# Patient Record
Sex: MALE | Race: AMERICAN INDIAN OR ALASKA NATIVE | NOT HISPANIC OR LATINO | Employment: OTHER | URBAN - METROPOLITAN AREA
[De-identification: names, ages, dates, MRNs, and addresses within clinical notes are randomized per-mention and may not be internally consistent; named-entity substitution may affect disease eponyms.]

---

## 2021-10-20 ENCOUNTER — HOSPITAL ENCOUNTER (EMERGENCY)
Facility: CLINIC | Age: 76
Discharge: HOME | End: 2021-10-20
Attending: FAMILY MEDICINE | Admitting: FAMILY MEDICINE
Payer: MEDICARE

## 2021-10-20 VITALS
HEART RATE: 75 BPM | OXYGEN SATURATION: 97 % | TEMPERATURE: 97.8 F | WEIGHT: 225 LBS | DIASTOLIC BLOOD PRESSURE: 70 MMHG | BODY MASS INDEX: 30.48 KG/M2 | SYSTOLIC BLOOD PRESSURE: 102 MMHG | HEIGHT: 72 IN | RESPIRATION RATE: 16 BRPM

## 2021-10-20 DIAGNOSIS — K57.32 DIVERTICULITIS OF COLON: ICD-10-CM

## 2021-10-20 PROCEDURE — 99283 EMERGENCY DEPT VISIT LOW MDM: CPT

## 2021-10-20 ASSESSMENT — MIFFLIN-ST. JEOR: SCORE: 1788.59

## 2021-10-20 NOTE — ED PROVIDER NOTES
EMERGENCY DEPARTMENT ENCOUNTER      NAME: Carlos Ch  AGE: 76 year old male  YOB: 1945  MRN: [id number]  EVALUATION DATE & TIME: 10/20/2021  1:59 PM    PCP: No primary care provider on file.    ED PROVIDER: Jace Mcginnis M.D.    Chief Complaint   Patient presents with     Abdominal Pain       FINAL IMPRESSION:  1. Diverticulitis of colon        ED COURSE & MEDICAL DECISION MAKING:    Pertinent Labs & Imaging studies personally reviewed and interpreted by me. (See chart for details)  2:08 PM  Patient seen and examined, prior records reviewed.  Differential diagnosis includes but not limited to gastritis, cholecystitis, pancreatitis, colitis, enteritis, diverticulitis, urinary tract infection, myocardial infarction, pneumonia appendicitis, AAA, cholelithiasis, ischemic bowel.  Patient presents with lower abdominal pain, had a CT scan done yesterday which showed diverticulitis without perforation.  No significant increase in his pain, no fevers.  Vitally stable and labs yesterday showed no leukocytosis.  Patient will be started on Augmentin and discharged with outpatient follow-up.           At the conclusion of the encounter I discussed the results of all of the tests and the disposition. The questions were answered. The patient or family acknowledged understanding and was agreeable with the care plan.     PROCEDURES:   Procedures    MEDICATIONS GIVEN IN THE EMERGENCY:  Medications - No data to display    NEW PRESCRIPTIONS STARTED AT TODAY'S ER VISIT  New Prescriptions    AMOXICILLIN-CLAVULANATE (AUGMENTIN) 875-125 MG TABLET    Take 1 tablet by mouth 2 times daily for 7 days       =================================================================    HPI    Patient information was obtained from: Patient      Carlos Ch is a 76 year old male with a pertinent history of diverticulitis who presents to this ED for evaluation of abdominal pain.  Patient has 2 or 3 days of low abdominal pain.  No  diarrhea or blood in the stools.  No fever, chills, nausea, or vomiting.  He had CTA done yesterday for surveillance of portal venous aneurysm which demonstrated incidental diverticulitis.  He had labs done yesterday which showed normal white blood cell count.  He has not had significant change in his pain since yesterday, tried to contact his primary care doctor for an appointment to get antibiotics prescribed but was unable to get an appointment.      REVIEW OF SYSTEMS   Review of Systems   All other systems reviewed and negative    PAST MEDICAL HISTORY:  No past medical history on file.    PAST SURGICAL HISTORY:  No past surgical history on file.    CURRENT MEDICATIONS:    No current facility-administered medications for this encounter.     Current Outpatient Medications   Medication     amoxicillin-clavulanate (AUGMENTIN) 875-125 MG tablet       ALLERGIES:  Allergies   Allergen Reactions     Clopidogrel Rash     Celecoxib Rash     Sulfa Drugs Rash       FAMILY HISTORY:  No family history on file.    SOCIAL HISTORY:   Social History     Socioeconomic History     Marital status: Not on file     Spouse name: Not on file     Number of children: Not on file     Years of education: Not on file     Highest education level: Not on file   Occupational History     Not on file   Tobacco Use     Smoking status: Not on file   Substance and Sexual Activity     Alcohol use: Not on file     Drug use: Not on file     Sexual activity: Not on file   Other Topics Concern     Not on file   Social History Narrative     Not on file     Social Determinants of Health     Financial Resource Strain:      Difficulty of Paying Living Expenses:    Food Insecurity:      Worried About Running Out of Food in the Last Year:      Ran Out of Food in the Last Year:    Transportation Needs:      Lack of Transportation (Medical):      Lack of Transportation (Non-Medical):    Physical Activity:      Days of Exercise per Week:      Minutes of Exercise  per Session:    Stress:      Feeling of Stress :    Social Connections:      Frequency of Communication with Friends and Family:      Frequency of Social Gatherings with Friends and Family:      Attends Taoism Services:      Active Member of Clubs or Organizations:      Attends Club or Organization Meetings:      Marital Status:    Intimate Partner Violence:      Fear of Current or Ex-Partner:      Emotionally Abused:      Physically Abused:      Sexually Abused:        VITALS:  /70   Pulse 75   Temp 97.8  F (36.6  C) (Temporal)   Resp 16   Ht 1.829 m (6')   Wt 102.1 kg (225 lb)   SpO2 97%   BMI 30.52 kg/m      PHYSICAL EXAM:  Physical Exam  Vitals and nursing note reviewed.   Constitutional:       Appearance: Normal appearance.   HENT:      Head: Normocephalic and atraumatic.      Right Ear: External ear normal.      Left Ear: External ear normal.      Nose: Nose normal.      Mouth/Throat:      Mouth: Mucous membranes are moist.   Eyes:      Extraocular Movements: Extraocular movements intact.      Conjunctiva/sclera: Conjunctivae normal.      Pupils: Pupils are equal, round, and reactive to light.   Cardiovascular:      Rate and Rhythm: Normal rate and regular rhythm.   Pulmonary:      Effort: Pulmonary effort is normal.      Breath sounds: Normal breath sounds. No wheezing or rales.   Abdominal:      General: Abdomen is flat. There is no distension.      Palpations: Abdomen is soft.      Tenderness: There is abdominal tenderness in the suprapubic area and left lower quadrant. There is no guarding.   Musculoskeletal:         General: Normal range of motion.      Cervical back: Normal range of motion and neck supple.      Right lower leg: No edema.      Left lower leg: No edema.   Lymphadenopathy:      Cervical: No cervical adenopathy.   Skin:     General: Skin is warm and dry.   Neurological:      General: No focal deficit present.      Mental Status: He is alert and oriented to person, place, and  time. Mental status is at baseline.      Comments: No gross focal neurologic deficits   Psychiatric:         Mood and Affect: Mood normal.         Behavior: Behavior normal.         Thought Content: Thought content normal.          LAB:  Reviewed outside labs from yesterday       RADIOLOGY:  Reviewed CTA report from outside facility done yesterday  No orders to display     Jace Mcginnis M.D.  Emergency Medicine  [facility] EMERGENCY ROOM  [address] [id number] [phone]  Dept: [phone]     Jace Mcginnis MD  10/20/21 [id number]

## 2021-10-20 NOTE — ED TRIAGE NOTES
"Arrives to ED with c/o LLQ abd pain. Reports having diverticulitis flare up. Went to [place] yesterday for an appt and incidentally dx with diverticulitis. Lab work completed at that time. PCP is out of town. Unable to make appt. Requesting prescription for Augmentin. \"That usually works for me\". Reports unable to sleep last night d/t pain. Denies bloody stools. Afebrile. Denies N/V.    "

## 2022-03-28 ENCOUNTER — APPOINTMENT (OUTPATIENT)
Dept: CT IMAGING | Facility: CLINIC | Age: 77
End: 2022-03-28
Attending: EMERGENCY MEDICINE
Payer: MEDICARE

## 2022-03-28 ENCOUNTER — HOSPITAL ENCOUNTER (OUTPATIENT)
Facility: CLINIC | Age: 77
Setting detail: OBSERVATION
Discharge: HOME | End: 2022-03-29
Attending: HOSPITALIST | Admitting: STUDENT IN AN ORGANIZED HEALTH CARE EDUCATION/TRAINING PROGRAM
Payer: MEDICARE

## 2022-03-28 ENCOUNTER — APPOINTMENT (OUTPATIENT)
Dept: RADIOLOGY | Facility: CLINIC | Age: 77
End: 2022-03-28
Attending: EMERGENCY MEDICINE
Payer: MEDICARE

## 2022-03-28 ENCOUNTER — HOSPITAL ENCOUNTER (EMERGENCY)
Facility: CLINIC | Age: 77
Discharge: STILL ADMITTED OR ADMINISTRATIVE | End: 2022-03-28
Attending: EMERGENCY MEDICINE | Admitting: EMERGENCY MEDICINE
Payer: MEDICARE

## 2022-03-28 VITALS
RESPIRATION RATE: 31 BRPM | BODY MASS INDEX: 30.52 KG/M2 | DIASTOLIC BLOOD PRESSURE: 98 MMHG | SYSTOLIC BLOOD PRESSURE: 171 MMHG | HEART RATE: 106 BPM | OXYGEN SATURATION: 97 % | TEMPERATURE: 98.6 F | WEIGHT: 225 LBS

## 2022-03-28 DIAGNOSIS — S09.90XA HEAD INJURY, INITIAL ENCOUNTER: ICD-10-CM

## 2022-03-28 DIAGNOSIS — S20.211A CHEST WALL CONTUSION, RIGHT, INITIAL ENCOUNTER: ICD-10-CM

## 2022-03-28 DIAGNOSIS — I48.92 ATRIAL FLUTTER, UNSPECIFIED TYPE (H): ICD-10-CM

## 2022-03-28 DIAGNOSIS — S05.11XA PERIORBITAL CONTUSION, RIGHT, INITIAL ENCOUNTER: ICD-10-CM

## 2022-03-28 DIAGNOSIS — S63.253A CLOSED DISLOCATION OF LEFT MIDDLE FINGER: ICD-10-CM

## 2022-03-28 DIAGNOSIS — Z79.01 WARFARIN ANTICOAGULATION: ICD-10-CM

## 2022-03-28 DIAGNOSIS — R55 SYNCOPE, UNSPECIFIED SYNCOPE TYPE: ICD-10-CM

## 2022-03-28 PROBLEM — N13.8 BPH WITH OBSTRUCTION/LOWER URINARY TRACT SYMPTOMS: Status: ACTIVE | Noted: 2020-06-26

## 2022-03-28 PROBLEM — I49.5 TACHY-BRADY SYNDROME (H): Status: ACTIVE | Noted: 2021-03-18

## 2022-03-28 PROBLEM — D75.1 ERYTHROCYTOSIS: Status: ACTIVE | Noted: 2020-10-24

## 2022-03-28 PROBLEM — N40.1 BPH WITH OBSTRUCTION/LOWER URINARY TRACT SYMPTOMS: Status: ACTIVE | Noted: 2020-06-26

## 2022-03-28 PROBLEM — I48.0 PAF (PAROXYSMAL ATRIAL FIBRILLATION) (H): Status: ACTIVE | Noted: 2021-03-18

## 2022-03-28 PROBLEM — D89.0 POLYCLONAL GAMMOPATHY: Status: ACTIVE | Noted: 2020-10-24

## 2022-03-28 PROBLEM — I25.10 CORONARY ATHEROSCLEROSIS: Status: ACTIVE | Noted: 2021-09-14

## 2022-03-28 PROBLEM — Z95.0 PACEMAKER: Status: ACTIVE | Noted: 2021-03-22

## 2022-03-28 PROBLEM — D69.3 CHRONIC ITP (IDIOPATHIC THROMBOCYTOPENIC PURPURA) (H): Status: ACTIVE | Noted: 2020-10-24

## 2022-03-28 PROBLEM — I86.8: Status: ACTIVE | Noted: 2019-11-07

## 2022-03-28 PROBLEM — I10 BENIGN ESSENTIAL HYPERTENSION: Status: ACTIVE | Noted: 2020-11-03

## 2022-03-28 PROBLEM — R39.15 URINARY URGENCY: Status: ACTIVE | Noted: 2020-06-26

## 2022-03-28 PROBLEM — M19.90 ARTHRITIS: Status: ACTIVE | Noted: 2021-09-14

## 2022-03-28 PROBLEM — K57.30 DIVERTICULOSIS OF COLON: Status: ACTIVE | Noted: 2021-09-14

## 2022-03-28 PROBLEM — E78.00 PURE HYPERCHOLESTEROLEMIA: Status: ACTIVE | Noted: 2021-09-14

## 2022-03-28 PROBLEM — K75.4 AUTOIMMUNE HEPATITIS (H): Status: ACTIVE | Noted: 2019-11-08

## 2022-03-28 LAB
ALBUMIN SERPL-MCNC: 4.2 G/DL (ref 3.5–5)
ALBUMIN UR-MCNC: 50 MG/DL
ALP SERPL-CCNC: 51 U/L (ref 45–120)
ALT SERPL W P-5'-P-CCNC: 18 U/L (ref 0–45)
ANION GAP SERPL CALCULATED.3IONS-SCNC: 12 MMOL/L (ref 5–18)
APPEARANCE UR: CLEAR
AST SERPL W P-5'-P-CCNC: 24 U/L (ref 0–40)
ATRIAL RATE - MUSE: 278 BPM
BASOPHILS # BLD AUTO: 0 10E3/UL (ref 0–0.2)
BASOPHILS NFR BLD AUTO: 0 %
BILIRUB DIRECT SERPL-MCNC: 0.3 MG/DL
BILIRUB SERPL-MCNC: 1 MG/DL (ref 0–1)
BILIRUB UR QL STRIP: NEGATIVE
BUN SERPL-MCNC: 14 MG/DL (ref 8–28)
CALCIUM SERPL-MCNC: 9.9 MG/DL (ref 8.5–10.5)
CHLORIDE BLD-SCNC: 105 MMOL/L (ref 98–107)
CO2 SERPL-SCNC: 25 MMOL/L (ref 22–31)
COLOR UR AUTO: YELLOW
CREAT SERPL-MCNC: 1.04 MG/DL (ref 0.7–1.3)
DIASTOLIC BLOOD PRESSURE - MUSE: 94 MMHG
EOSINOPHIL # BLD AUTO: 0 10E3/UL (ref 0–0.7)
EOSINOPHIL NFR BLD AUTO: 1 %
ERYTHROCYTE [DISTWIDTH] IN BLOOD BY AUTOMATED COUNT: 12.6 % (ref 10–15)
GFR SERPL CREATININE-BSD FRML MDRD: 74 ML/MIN/1.73M2
GLUCOSE BLD-MCNC: 88 MG/DL (ref 70–125)
GLUCOSE UR STRIP-MCNC: NEGATIVE MG/DL
HCT VFR BLD AUTO: 49.9 % (ref 40–53)
HGB BLD-MCNC: 17.1 G/DL (ref 13.3–17.7)
HGB UR QL STRIP: NEGATIVE
HYALINE CASTS: 1 /LPF
IMM GRANULOCYTES # BLD: 0 10E3/UL
IMM GRANULOCYTES NFR BLD: 1 %
INR PPP: 2.7 (ref 0.85–1.15)
INTERPRETATION ECG - MUSE: NORMAL
KETONES UR STRIP-MCNC: NEGATIVE MG/DL
LEUKOCYTE ESTERASE UR QL STRIP: NEGATIVE
LIPASE SERPL-CCNC: 59 U/L (ref 0–52)
LYMPHOCYTES # BLD AUTO: 1.1 10E3/UL (ref 0.8–5.3)
LYMPHOCYTES NFR BLD AUTO: 14 %
MAGNESIUM SERPL-MCNC: 2.1 MG/DL (ref 1.8–2.6)
MCH RBC QN AUTO: 31.8 PG (ref 26.5–33)
MCHC RBC AUTO-ENTMCNC: 34.3 G/DL (ref 31.5–36.5)
MCV RBC AUTO: 93 FL (ref 78–100)
MONOCYTES # BLD AUTO: 0.7 10E3/UL (ref 0–1.3)
MONOCYTES NFR BLD AUTO: 9 %
MUCOUS THREADS #/AREA URNS LPF: PRESENT /LPF
NEUTROPHILS # BLD AUTO: 5.6 10E3/UL (ref 1.6–8.3)
NEUTROPHILS NFR BLD AUTO: 75 %
NITRATE UR QL: NEGATIVE
NRBC # BLD AUTO: 0 10E3/UL
NRBC BLD AUTO-RTO: 0 /100
P AXIS - MUSE: 78 DEGREES
PH UR STRIP: 6 [PH] (ref 5–7)
PLATELET # BLD AUTO: 111 10E3/UL (ref 150–450)
POTASSIUM BLD-SCNC: 4.2 MMOL/L (ref 3.5–5)
PR INTERVAL - MUSE: NORMAL MS
PROT SERPL-MCNC: 7.1 G/DL (ref 6–8)
QRS DURATION - MUSE: 100 MS
QT - MUSE: 384 MS
QTC - MUSE: 462 MS
R AXIS - MUSE: -12 DEGREES
RBC # BLD AUTO: 5.37 10E6/UL (ref 4.4–5.9)
RBC URINE: 3 /HPF
SARS-COV-2 RNA RESP QL NAA+PROBE: NEGATIVE
SODIUM SERPL-SCNC: 142 MMOL/L (ref 136–145)
SP GR UR STRIP: 1.02 (ref 1–1.03)
SQUAMOUS EPITHELIAL: <1 /HPF
SYSTOLIC BLOOD PRESSURE - MUSE: 184 MMHG
T AXIS - MUSE: 30 DEGREES
TROPONIN I SERPL-MCNC: <0.01 NG/ML (ref 0–0.29)
TSH SERPL DL<=0.005 MIU/L-ACNC: 3.95 UIU/ML (ref 0.3–5)
UROBILINOGEN UR STRIP-MCNC: <2 MG/DL
VENTRICULAR RATE- MUSE: 87 BPM
WBC # BLD AUTO: 7.4 10E3/UL (ref 4–11)
WBC URINE: 2 /HPF

## 2022-03-28 PROCEDURE — 99285 EMERGENCY DEPT VISIT HI MDM: CPT | Mod: 25

## 2022-03-28 PROCEDURE — 83690 ASSAY OF LIPASE: CPT | Performed by: EMERGENCY MEDICINE

## 2022-03-28 PROCEDURE — 250N000011 HC RX IP 250 OP 636: Performed by: EMERGENCY MEDICINE

## 2022-03-28 PROCEDURE — 70450 CT HEAD/BRAIN W/O DYE: CPT

## 2022-03-28 PROCEDURE — 96374 THER/PROPH/DIAG INJ IV PUSH: CPT

## 2022-03-28 PROCEDURE — 81001 URINALYSIS AUTO W/SCOPE: CPT | Performed by: EMERGENCY MEDICINE

## 2022-03-28 PROCEDURE — 83735 ASSAY OF MAGNESIUM: CPT | Performed by: EMERGENCY MEDICINE

## 2022-03-28 PROCEDURE — 70486 CT MAXILLOFACIAL W/O DYE: CPT

## 2022-03-28 PROCEDURE — 82248 BILIRUBIN DIRECT: CPT | Performed by: EMERGENCY MEDICINE

## 2022-03-28 PROCEDURE — 36415 COLL VENOUS BLD VENIPUNCTURE: CPT | Performed by: EMERGENCY MEDICINE

## 2022-03-28 PROCEDURE — 26770 TREAT FINGER DISLOCATION: CPT | Mod: F2

## 2022-03-28 PROCEDURE — 71250 CT THORAX DX C-: CPT

## 2022-03-28 PROCEDURE — 85610 PROTHROMBIN TIME: CPT | Performed by: EMERGENCY MEDICINE

## 2022-03-28 PROCEDURE — 73140 X-RAY EXAM OF FINGER(S): CPT | Mod: LT

## 2022-03-28 PROCEDURE — G0378 HOSPITAL OBSERVATION PER HR: HCPCS

## 2022-03-28 PROCEDURE — 84443 ASSAY THYROID STIM HORMONE: CPT | Performed by: EMERGENCY MEDICINE

## 2022-03-28 PROCEDURE — 84484 ASSAY OF TROPONIN QUANT: CPT | Performed by: EMERGENCY MEDICINE

## 2022-03-28 PROCEDURE — 250N000013 HC RX MED GY IP 250 OP 250 PS 637: Performed by: STUDENT IN AN ORGANIZED HEALTH CARE EDUCATION/TRAINING PROGRAM

## 2022-03-28 PROCEDURE — 82310 ASSAY OF CALCIUM: CPT | Performed by: EMERGENCY MEDICINE

## 2022-03-28 PROCEDURE — 99220 PR INITIAL OBSERVATION CARE,LEVEL III: CPT | Performed by: STUDENT IN AN ORGANIZED HEALTH CARE EDUCATION/TRAINING PROGRAM

## 2022-03-28 PROCEDURE — 85025 COMPLETE CBC W/AUTO DIFF WBC: CPT | Performed by: EMERGENCY MEDICINE

## 2022-03-28 PROCEDURE — 93005 ELECTROCARDIOGRAM TRACING: CPT | Performed by: EMERGENCY MEDICINE

## 2022-03-28 PROCEDURE — 684N000002 HC FULL TRAUMA W/O CC LEVEL IV

## 2022-03-28 PROCEDURE — 250N000013 HC RX MED GY IP 250 OP 250 PS 637: Performed by: EMERGENCY MEDICINE

## 2022-03-28 PROCEDURE — 87635 SARS-COV-2 COVID-19 AMP PRB: CPT | Performed by: EMERGENCY MEDICINE

## 2022-03-28 PROCEDURE — C9803 HOPD COVID-19 SPEC COLLECT: HCPCS

## 2022-03-28 RX ORDER — AZATHIOPRINE 50 MG/1
50 TABLET ORAL DAILY
COMMUNITY

## 2022-03-28 RX ORDER — ONDANSETRON 2 MG/ML
4 INJECTION INTRAMUSCULAR; INTRAVENOUS EVERY 6 HOURS PRN
Status: DISCONTINUED | OUTPATIENT
Start: 2022-03-28 | End: 2022-03-29 | Stop reason: HOSPADM

## 2022-03-28 RX ORDER — NALOXONE HYDROCHLORIDE 0.4 MG/ML
0.2 INJECTION, SOLUTION INTRAMUSCULAR; INTRAVENOUS; SUBCUTANEOUS
Status: DISCONTINUED | OUTPATIENT
Start: 2022-03-28 | End: 2022-03-29 | Stop reason: HOSPADM

## 2022-03-28 RX ORDER — MULTIPLE VITAMINS W/ MINERALS TAB 9MG-400MCG
1 TAB ORAL DAILY
COMMUNITY

## 2022-03-28 RX ORDER — ONDANSETRON 4 MG/1
4 TABLET, ORALLY DISINTEGRATING ORAL EVERY 6 HOURS PRN
Status: DISCONTINUED | OUTPATIENT
Start: 2022-03-28 | End: 2022-03-29 | Stop reason: HOSPADM

## 2022-03-28 RX ORDER — ASPIRIN 81 MG/1
81 TABLET ORAL AT BEDTIME
COMMUNITY
End: 2024-02-14

## 2022-03-28 RX ORDER — LIDOCAINE 4 G/G
1 PATCH TOPICAL ONCE
Status: DISCONTINUED | OUTPATIENT
Start: 2022-03-28 | End: 2022-03-28 | Stop reason: HOSPADM

## 2022-03-28 RX ORDER — AMOXICILLIN 250 MG
2 CAPSULE ORAL 2 TIMES DAILY PRN
Status: DISCONTINUED | OUTPATIENT
Start: 2022-03-28 | End: 2022-03-29 | Stop reason: HOSPADM

## 2022-03-28 RX ORDER — AMLODIPINE BESYLATE 2.5 MG/1
2.5 TABLET ORAL DAILY
Status: DISCONTINUED | OUTPATIENT
Start: 2022-03-29 | End: 2022-03-28

## 2022-03-28 RX ORDER — ACETAMINOPHEN 325 MG/1
650 TABLET ORAL EVERY 6 HOURS PRN
Status: DISCONTINUED | OUTPATIENT
Start: 2022-03-28 | End: 2022-03-29 | Stop reason: HOSPADM

## 2022-03-28 RX ORDER — WARFARIN SODIUM 7.5 MG/1
7.5 TABLET ORAL
Status: COMPLETED | OUTPATIENT
Start: 2022-03-28 | End: 2022-03-28

## 2022-03-28 RX ORDER — AMOXICILLIN 250 MG
1 CAPSULE ORAL 2 TIMES DAILY PRN
Status: DISCONTINUED | OUTPATIENT
Start: 2022-03-28 | End: 2022-03-29 | Stop reason: HOSPADM

## 2022-03-28 RX ORDER — AZATHIOPRINE 50 MG/1
50 TABLET ORAL DAILY
Status: DISCONTINUED | OUTPATIENT
Start: 2022-03-29 | End: 2022-03-29 | Stop reason: HOSPADM

## 2022-03-28 RX ORDER — ACETAMINOPHEN 650 MG/1
650 SUPPOSITORY RECTAL EVERY 6 HOURS PRN
Status: DISCONTINUED | OUTPATIENT
Start: 2022-03-28 | End: 2022-03-29 | Stop reason: HOSPADM

## 2022-03-28 RX ORDER — FINASTERIDE 5 MG/1
5 TABLET, FILM COATED ORAL AT BEDTIME
COMMUNITY

## 2022-03-28 RX ORDER — TAMSULOSIN HYDROCHLORIDE 0.4 MG/1
0.8 CAPSULE ORAL AT BEDTIME
COMMUNITY
End: 2022-11-10

## 2022-03-28 RX ORDER — AMLODIPINE BESYLATE 2.5 MG/1
2.5 TABLET ORAL DAILY
Status: ON HOLD | COMMUNITY
End: 2022-03-29

## 2022-03-28 RX ORDER — PROCHLORPERAZINE 25 MG
12.5 SUPPOSITORY, RECTAL RECTAL EVERY 12 HOURS PRN
Status: DISCONTINUED | OUTPATIENT
Start: 2022-03-28 | End: 2022-03-29 | Stop reason: HOSPADM

## 2022-03-28 RX ORDER — ACETAMINOPHEN 325 MG/1
325-650 TABLET ORAL EVERY 6 HOURS PRN
COMMUNITY

## 2022-03-28 RX ORDER — LISINOPRIL 10 MG/1
10 TABLET ORAL DAILY
COMMUNITY

## 2022-03-28 RX ORDER — CALCIUM POLYCARBOPHIL 625 MG 625 MG/1
2 TABLET ORAL DAILY
COMMUNITY

## 2022-03-28 RX ORDER — WARFARIN SODIUM 5 MG/1
5-7.5 TABLET ORAL SEE ADMIN INSTRUCTIONS
COMMUNITY
End: 2022-11-16 | Stop reason: DRUGHIGH

## 2022-03-28 RX ORDER — POLYETHYLENE GLYCOL 3350 17 G/17G
17 POWDER, FOR SOLUTION ORAL DAILY PRN
Status: DISCONTINUED | OUTPATIENT
Start: 2022-03-28 | End: 2022-03-29 | Stop reason: HOSPADM

## 2022-03-28 RX ORDER — PROCHLORPERAZINE MALEATE 5 MG
5 TABLET ORAL EVERY 6 HOURS PRN
Status: DISCONTINUED | OUTPATIENT
Start: 2022-03-28 | End: 2022-03-29 | Stop reason: HOSPADM

## 2022-03-28 RX ORDER — ATORVASTATIN CALCIUM 40 MG/1
40 TABLET, FILM COATED ORAL AT BEDTIME
COMMUNITY

## 2022-03-28 RX ORDER — KETOROLAC TROMETHAMINE 15 MG/ML
15 INJECTION, SOLUTION INTRAMUSCULAR; INTRAVENOUS ONCE
Status: COMPLETED | OUTPATIENT
Start: 2022-03-28 | End: 2022-03-28

## 2022-03-28 RX ORDER — LISINOPRIL 20 MG/1
20 TABLET ORAL DAILY
Status: DISCONTINUED | OUTPATIENT
Start: 2022-03-29 | End: 2022-03-29 | Stop reason: HOSPADM

## 2022-03-28 RX ORDER — NALOXONE HYDROCHLORIDE 0.4 MG/ML
0.4 INJECTION, SOLUTION INTRAMUSCULAR; INTRAVENOUS; SUBCUTANEOUS
Status: DISCONTINUED | OUTPATIENT
Start: 2022-03-28 | End: 2022-03-29 | Stop reason: HOSPADM

## 2022-03-28 RX ORDER — ACETAMINOPHEN 325 MG/1
975 TABLET ORAL ONCE
Status: COMPLETED | OUTPATIENT
Start: 2022-03-28 | End: 2022-03-28

## 2022-03-28 RX ORDER — FINASTERIDE 5 MG/1
5 TABLET, FILM COATED ORAL AT BEDTIME
Status: DISCONTINUED | OUTPATIENT
Start: 2022-03-28 | End: 2022-03-29 | Stop reason: HOSPADM

## 2022-03-28 RX ORDER — METOPROLOL SUCCINATE 25 MG/1
12.5 TABLET, EXTENDED RELEASE ORAL DAILY
COMMUNITY

## 2022-03-28 RX ORDER — METOCLOPRAMIDE 10 MG/1
10 TABLET ORAL
COMMUNITY
End: 2022-11-10

## 2022-03-28 RX ORDER — METOCLOPRAMIDE 5 MG/1
10 TABLET ORAL
Status: DISCONTINUED | OUTPATIENT
Start: 2022-03-29 | End: 2022-03-29 | Stop reason: HOSPADM

## 2022-03-28 RX ORDER — ASPIRIN 81 MG/1
81 TABLET ORAL AT BEDTIME
Status: DISCONTINUED | OUTPATIENT
Start: 2022-03-28 | End: 2022-03-29 | Stop reason: HOSPADM

## 2022-03-28 RX ORDER — TAMSULOSIN HYDROCHLORIDE 0.4 MG/1
0.8 CAPSULE ORAL AT BEDTIME
Status: DISCONTINUED | OUTPATIENT
Start: 2022-03-28 | End: 2022-03-29 | Stop reason: HOSPADM

## 2022-03-28 RX ADMIN — WARFARIN SODIUM 7.5 MG: 7.5 TABLET ORAL at 22:47

## 2022-03-28 RX ADMIN — ASPIRIN 81 MG: 81 TABLET, COATED ORAL at 22:47

## 2022-03-28 RX ADMIN — ACETAMINOPHEN 975 MG: 325 TABLET ORAL at 19:47

## 2022-03-28 RX ADMIN — FINASTERIDE 5 MG: 5 TABLET, FILM COATED ORAL at 22:47

## 2022-03-28 RX ADMIN — LIDOCAINE 1 PATCH: 246 PATCH TOPICAL at 19:47

## 2022-03-28 RX ADMIN — KETOROLAC TROMETHAMINE 15 MG: 15 INJECTION, SOLUTION INTRAMUSCULAR; INTRAVENOUS at 19:47

## 2022-03-28 RX ADMIN — TAMSULOSIN HYDROCHLORIDE 0.8 MG: 0.4 CAPSULE ORAL at 22:47

## 2022-03-28 ASSESSMENT — ENCOUNTER SYMPTOMS
ARTHRALGIAS: 1
NECK PAIN: 0
HEADACHES: 0

## 2022-03-28 ASSESSMENT — ACTIVITIES OF DAILY LIVING (ADL): DEPENDENT_IADLS:: INDEPENDENT

## 2022-03-28 NOTE — ED TRIAGE NOTES
Pt here after falling. Does not remember what happened. Is on coumadin. Right eye has some bruising around it. Left middle finger is probably dislocated. Right rib area is hurting.Dr Perez saw pt in triage.

## 2022-03-28 NOTE — ED PROVIDER NOTES
EMERGENCY DEPARTMENT ENCOUNTER      NAME: Carlos Ch  AGE: 76 year old male  YOB: 1945  MRN: [id number]  EVALUATION DATE & TIME: No admission date for patient encounter.    PCP: System, Provider Not In    ED PROVIDER: José Perez M.D.      Chief Complaint   Patient presents with     Fall     Head Injury         IMPRESSION  1. Syncope, unspecified syncope type    2. Head injury, initial encounter    3. Periorbital contusion, right, initial encounter    4. Chest wall contusion, right, initial encounter    5. Closed dislocation of left middle finger    6. Atrial flutter, unspecified type (H)    7. Warfarin anticoagulation        PLAN  - transfer to [facility] for cards tele obs    ED COURSE & MEDICAL DECISION MAKING    ED Course as of 03/28/22 1936   Mon Mar 28, 2022   1629 76yoM with history of chronic ITP, persistent a-fib/flutter (takes warfarin), tachy-wesly syndrome (s/p pacemaker), CAD s/p stent (1990s), HTN, HLD, autoimmune hepatitis presenting with his wife for evaluation of head injury & syncope. Patient reports he was walking the dog outside as usual, the awoke on the ground. Does not recall passing out. Awoke with bruising around right eye, right chest wall pain, left middle finger pain/deformity. No neck pain, difficulty breathing, vision changes, numbness, tingling, focal weakness, abdominal pain. States he was feeling fine this morning. Came straight to the ED. Denies any tongue trauma, bowel or bladder incontinence.    Trauma alert made in triage so I evaluated patient in triage bay. BP 210s/100s on presentation with otherwise normal vitals. Calm on exam with nonfocal neuro exam, nontender bruising to right periorbital area with EOMI with no diplopia, no c-spine tenderness with painless active ROM intact, mild right lateral chest wall tenderness with no crepitus or visual evidence of trauma, clear lungs bilaterally, benign abdomen with no visual evidence of trauma, no peripheral  edema, left middle finger with obvious ulnar-deviated dislocation of PIP with overlying skin intact (reduced easily at bedside after patient agreeable with this); otherwise atraumatic extremities. Will obtain CT head/face/chest given fall on warfarin with pains as well as X-rays of left 3rd finger. Will also workup syncope with EKG, blood, urine. Story concerning for possible cardiac syncope given no symptoms prior to syncope and personal cardiac history. Patient declines any pain meds at this time. Patient & wife agreeable with this plan; no further questions at this time.     CT head/face with no ICH or fracture. CT chest with no rib fracture, pneumothorax, pulmonary contusion; unremarkable. X-rays left middle finger negative for fracture as well; placed in splint for finger dislocation. EKG with a-flutter; troponin negative. Story still concerning for possible cardiac syncope so warrants telemetry admission overnight. Labs otherwise unremarkable with no BILL, no glaring electrolyte abnormality, no anemia, no leukocytosis, normal TSH. INR therapeutic at 2.7. UA with no UTI. COVID-19 negative. Patient asymptomatic on recheck. Wants to go home but ultimately agreeable with admission overnight given story concerning for possible cardiac syncope. No cards tele beds available at ; found at [facility]. Consulted hospitalist for admission; they agreed. Patient understood and agreed with the plan; no further questions at the time of transfer.    --------------------------------------------------------------------------------   --------------------------------------------------------------------------------     3:57 PM Trauma alert called. I met with the patient and his wife in triage for the initial interview and physical examination. Discussed plan for treatment and workup in the ED.  6:14 PM I rechecked and updated the patient on lab, EKG, and imaging results. He is feeling improved and would prefer to discharge, though  "ultimately is agreeable with admission given his syncopal episode.   7:19 PM I spoke with Dr. Card, [facility] hospitalist, who accepts the patient in transfer for admission.   7:32 PM I updated the patient on plan for transfer. He is agreeable with this though notes he is \"not happy about it.\"    This patient involved a high degree of complexity in medical decision making, as significant risks were present and assessed.    Broad differential considered for this patient presenting, including but not limited to:  Traumatic injury, ICH, skull fracture, rib fracture, pneumothorax, pulmonary contusion, finger dislocation, finger fracture, cardiac syncope, neurogenic syncope, dehydration, BILL, electrolyte derangement, anemia, infectious process    I wore the following PPE during this patient encounter:  N95 mask, face shield, gloves    MEDICATIONS GIVEN IN THE EMERGENCY DEPARTMENT  Medications - No data to display      =================================================================      HPI  Patient information was obtained from: patient    Use of : N/A        Carlos Ch is a 76 year old male with a pertinent history of paroxysmal atrial fibrillation anticoagulated on Warfarin, tachy-wesly syndrome with pacemaker in place, coronary atherosclerosis s/p stenting, HTN, and chronic idiopathic thrombocytopenic purpura, who presents to this ED via private vehicle with spouse for evaluation of fall.    Patient was walking his dog when he had an unwitnessed syncopal episode and fall. He does not remember falling and states the next thing he remembers was waking up on the ground. He currently endorses right rib pain and left third finger pain with obvious deformity. Denies any headache or neck pain. Patient is currently anticoagulated on Warfarin.       REVIEW OF SYSTEMS   Review of Systems   Musculoskeletal: Positive for arthralgias (right rib). Negative for neck pain.        Positive for left third finger pain " with obvious deformity   Neurological: Positive for syncope. Negative for headaches.   All other systems reviewed and are negative.    All other systems reviewed and are negative except as noted above in HPI.          --------------- MEDICAL HISTORY ---------------  PAST MEDICAL HISTORY:  No past medical history on file.  Patient Active Problem List   Diagnosis     Abnormal LFTs     Anticoagulation monitoring, INR range 2-3     Arthritis     Autoimmune hepatitis (H)     Benign essential hypertension     BPH with obstruction/lower urinary tract symptoms     Chronic ITP (idiopathic thrombocytopenic purpura) (H)     Coronary atherosclerosis     Diverticulitis     Diverticulosis of colon     Erythrocytosis     Pacemaker     PAF (paroxysmal atrial fibrillation) (H)     Polyclonal gammopathy     Portal vein aneurysm     Pure hypercholesterolemia     Tachy-wesly syndrome (H)     Urinary urgency       PAST SURGICAL HISTORY:  Coronary stent (1990s)  Pacemaker placement    CURRENT MEDICATIONS:    No current facility-administered medications for this encounter.    Current Outpatient Medications:      acetaminophen (TYLENOL) 325 MG tablet, Take 325-650 mg by mouth every 6 hours as needed for mild pain, Disp: , Rfl:      amLODIPine (NORVASC) 2.5 MG tablet, Take 2.5 mg by mouth daily, Disp: , Rfl:      aspirin 81 MG EC tablet, Take 81 mg by mouth At Bedtime, Disp: , Rfl:      atorvastatin (LIPITOR) 40 MG tablet, Take 40 mg by mouth At Bedtime, Disp: , Rfl:      azaTHIOprine (IMURAN) 50 MG tablet, Take 50 mg by mouth daily, Disp: , Rfl:      calcium polycarbophil (FIBERCON) 625 MG tablet, Take 2 tablets by mouth daily, Disp: , Rfl:      finasteride (PROSCAR) 5 MG tablet, Take 5 mg by mouth At Bedtime, Disp: , Rfl:      lisinopril (ZESTRIL) 20 MG tablet, Take 20 mg by mouth daily, Disp: , Rfl:      metoclopramide (REGLAN) 10 MG tablet, Take 10 mg by mouth daily before breakfast, Disp: , Rfl:      metoprolol succinate ER (TOPROL-XL)  25 MG 24 hr tablet, Take 12.5 mg by mouth daily, Disp: , Rfl:      multivitamin w/minerals (THERA-VIT-M) tablet, Take 1 tablet by mouth daily, Disp: , Rfl:      tamsulosin (FLOMAX) 0.4 MG capsule, Take 0.8 mg by mouth At Bedtime, Disp: , Rfl:      warfarin ANTICOAGULANT (COUMADIN) 7.5 MG tablet, Take 7.5 mg by mouth At Bedtime, Disp: , Rfl:     ALLERGIES:  Allergies   Allergen Reactions     Clopidogrel Rash     Celecoxib Rash     Sulfa Drugs Rash       FAMILY HISTORY:  Reviewed. No pertinent past family history.    SOCIAL HISTORY:   Social History     Socioeconomic History     Marital status:      Spouse name: Not on file     Number of children: Not on file     Years of education: Not on file     Highest education level: Not on file   Occupational History     Not on file   Tobacco Use     Smoking status: Not on file     Smokeless tobacco: Not on file   Substance and Sexual Activity     Alcohol use: Not on file     Drug use: Not on file     Sexual activity: Not on file   Other Topics Concern     Not on file   Social History Narrative     Not on file     Social Determinants of Health     Financial Resource Strain: Not on file   Food Insecurity: Not on file   Transportation Needs: Not on file   Physical Activity: Not on file   Stress: Not on file   Social Connections: Not on file   Intimate Partner Violence: Not on file   Housing Stability: Not on file         --------------- PHYSICAL EXAM ---------------  Nursing notes and vitals reviewed by me.  VITALS:  Vitals:    03/28/22 1815 03/28/22 1830 03/28/22 1845 03/28/22 1900   BP: (!) 181/90 (!) 178/113  (!) 189/115   Pulse: 102 104 110 108   Resp:  22 27 28   Temp:       TempSrc:       SpO2: 97% 98% 98% 97%   Weight:           PHYSICAL EXAM:    General:  alert, interactive, no distress  Eyes:  conjunctivae clear, conjugate gaze PERRL @ 3mm with painless EOMI with no diplopia, no proptosis  HENT:  Right periorbital ecchymosis with no tenderness or crepitus, nose  with no rhinorrhea, oropharynx clear  Neck:  no meningismus, no c-spine tenderness with painless active ROM intact  Cardiovascular:  HR 90s during exam, regular rhythm, no murmurs, brisk cap refill  Chest:  Right lateral chest wall tenderness with no crepitus or visual evidence of trauma  Pulmonary:  no stridor, normal phonation, normal work of breathing, clear lungs bilaterally  Abdomen:  soft, nondistended, nontender, no visual evidence of trauma  :  no CVA tenderness  Back:  no midline spinal tenderness or visual evidence of trauma  Musculoskeletal:    LUE:  Deformity to 3rd finger at PIP joint with ulnar deviation and overlying skin intact (reduced during exam with resulting full ROM of PIP intact); extremity otherwise atraumatic with painless active ROM intact, overlying skin intact, distal CMS intact  RUE:  atraumatic with painless active ROM intact, overlying skin intact, distal CMS intact  BLE:  atraumatic with painless active ROM intact & distal CMS intact  Skin:  warm, dry, no rash  Neuro:  awake, alert, answers questions appropriately, follows commands, moves all limbs, CN 2-12 intact, negative pronator drift, 5/5 strength to all extremities with sensation to light touch intact, no tremor  Psych:  calm, normal affect      --------------- RESULTS ---------------  EKG:    Reviewed and interpreted by me.  - atrial flutter with ventricular rate 87bpm, no ST changes, , QTc 462  - no priors for visual comparison but CareEverywhere notes persistent atrial fibrillation/flutter in his history  My read.    LAB:  Reviewed and interpreted by me.  Results for orders placed or performed during the hospital encounter of 03/28/22   Head CT w/o contrast    Impression    CONCLUSION:  HEAD CT:  1.  No acute intracranial abnormality.  2.  No acute calvarial fracture or scalp hematoma.  3.  Mild global brain parenchymal volume loss with presumed sequelae of mild chronic small vessel ischemic disease.    FACIAL BONE  AND MANDIBLE CT:  1.  Mild preseptal periorbital soft tissue swelling without underlying acute fracture.  2.  No acute maxillofacial or mandibular fracture or traumatic dislocation elsewhere..   CT Facial Bones without Contrast    Impression    CONCLUSION:  HEAD CT:  1.  No acute intracranial abnormality.  2.  No acute calvarial fracture or scalp hematoma.  3.  Mild global brain parenchymal volume loss with presumed sequelae of mild chronic small vessel ischemic disease.    FACIAL BONE AND MANDIBLE CT:  1.  Mild preseptal periorbital soft tissue swelling without underlying acute fracture.  2.  No acute maxillofacial or mandibular fracture or traumatic dislocation elsewhere..   Chest CT w/o contrast    Impression    IMPRESSION:     1.  No acute lung, airway, or pleural process.  2.  Moderate coronary atheromatous calcifications and dual-chamber pacemaker.  3.  Focal extrapleural soft tissue thickening right anterior chest near the fifth rib adjacent healed fracture deformity of the left anterior fourth rib but no acute fracture identified. Findings most likely reflect sequela of prior trauma. No acute rib   fracture or chest wall hematoma identified.     Fingers XR, 2-3 views, left    Impression    IMPRESSION: 3 views of the middle finger show no dislocation at this time. There are mild degenerative changes of the IP joints. Soft tissue swelling at the PIP joint. No acute displaced fracture.    Severe arthritic changes triscaphe and first CMC joints.     Result Value Ref Range    INR 2.70 (H) 0.85 - 1.15   Basic metabolic panel   Result Value Ref Range    Sodium 142 136 - 145 mmol/L    Potassium 4.2 3.5 - 5.0 mmol/L    Chloride 105 98 - 107 mmol/L    Carbon Dioxide (CO2) 25 22 - 31 mmol/L    Anion Gap 12 5 - 18 mmol/L    Urea Nitrogen 14 8 - 28 mg/dL    Creatinine 1.04 0.70 - 1.30 mg/dL    Calcium 9.9 8.5 - 10.5 mg/dL    Glucose 88 70 - 125 mg/dL    GFR Estimate 74 >60 mL/min/1.73m2   Hepatic function panel   Result  Value Ref Range    Bilirubin Total 1.0 0.0 - 1.0 mg/dL    Bilirubin Direct 0.3 <=0.5 mg/dL    Protein Total 7.1 6.0 - 8.0 g/dL    Albumin 4.2 3.5 - 5.0 g/dL    Alkaline Phosphatase 51 45 - 120 U/L    AST 24 0 - 40 U/L    ALT 18 0 - 45 U/L   Result Value Ref Range    Magnesium 2.1 1.8 - 2.6 mg/dL   TSH with free T4 reflex   Result Value Ref Range    TSH 3.95 0.30 - 5.00 uIU/mL   Result Value Ref Range    Troponin I <0.01 0.00 - 0.29 ng/mL   Result Value Ref Range    Lipase 59 (H) 0 - 52 U/L   UA with Microscopic reflex to Culture    Specimen: Urine, Midstream   Result Value Ref Range    Color Urine Yellow Colorless, Straw, Light Yellow, Yellow    Appearance Urine Clear Clear    Glucose Urine Negative Negative mg/dL    Bilirubin Urine Negative Negative    Ketones Urine Negative Negative mg/dL    Specific Gravity Urine 1.019 1.001 - 1.030    Blood Urine Negative Negative    pH Urine 6.0 5.0 - 7.0    Protein Albumin Urine 50  (A) Negative mg/dL    Urobilinogen Urine <2.0 <2.0 mg/dL    Nitrite Urine Negative Negative    Leukocyte Esterase Urine Negative Negative    Mucus Urine Present (A) None Seen /LPF    RBC Urine 3 (H) <=2 /HPF    WBC Urine 2 <=5 /HPF    Squamous Epithelials Urine <1 <=1 /HPF    Hyaline Casts Urine 1 <=2 /LPF   CBC with platelets and differential   Result Value Ref Range    WBC Count 7.4 4.0 - 11.0 10e3/uL    RBC Count 5.37 4.40 - 5.90 10e6/uL    Hemoglobin 17.1 13.3 - 17.7 g/dL    Hematocrit 49.9 40.0 - 53.0 %    MCV 93 78 - 100 fL    MCH 31.8 26.5 - 33.0 pg    MCHC 34.3 31.5 - 36.5 g/dL    RDW 12.6 10.0 - 15.0 %    Platelet Count 111 (L) 150 - 450 10e3/uL    % Neutrophils 75 %    % Lymphocytes 14 %    % Monocytes 9 %    % Eosinophils 1 %    % Basophils 0 %    % Immature Granulocytes 1 %    NRBCs per 100 WBC 0 <1 /100    Absolute Neutrophils 5.6 1.6 - 8.3 10e3/uL    Absolute Lymphocytes 1.1 0.8 - 5.3 10e3/uL    Absolute Monocytes 0.7 0.0 - 1.3 10e3/uL    Absolute Eosinophils 0.0 0.0 - 0.7 10e3/uL     Absolute Basophils 0.0 0.0 - 0.2 10e3/uL    Absolute Immature Granulocytes 0.0 <=0.4 10e3/uL    Absolute NRBCs 0.0 10e3/uL   ECG 12-LEAD WITH MUSE (LHE)   Result Value Ref Range    Systolic Blood Pressure 184 mmHg    Diastolic Blood Pressure 94 mmHg    Ventricular Rate 87 BPM    Atrial Rate 278 BPM    WV Interval  ms    QRS Duration 100 ms     ms    QTc 462 ms    P Axis 78 degrees    R AXIS -12 degrees    T Axis 30 degrees    Interpretation ECG       Atrial flutter with variable A-V block with premature ventricular or aberrantly conducted complexes  Inferior-posterior infarct , possibly acute  ** ** ACUTE MI / STEMI ** **  Abnormal ECG  No previous ECGs available  Confirmed by SEE ED PROVIDER NOTE FOR, ECG INTERPRETATION ([id number]),  AMRIT DINH ([id number]) on 3/28/2022 4:42:42 PM         RADIOLOGY:  Reviewed by me. Please see official radiology report.  Recent Results (from the past 24 hour(s))   Chest CT w/o contrast    Narrative    EXAM: CT CHEST W/O CONTRAST  LOCATION: [facility]  DATE/TIME: 3/28/2022 5:04 PM    INDICATION: Right chest wall pain  COMPARISON: None.  TECHNIQUE: CT chest without IV contrast. Multiplanar reformats were obtained. Dose reduction techniques were used.  CONTRAST: None.    FINDINGS:   LUNGS AND PLEURA: No airspace opacities or interstitial thickening. There are a few punctate calcified nodules in both lungs consistent with benign granulomata and do not need additional workup or follow-up. Trachea and central airways are patent. No   pleural effusions are present.    MEDIASTINUM: Cardiac chambers are normal in size. There is no pericardial effusion. Main pulmonary artery and thoracic aorta are normal caliber. Minimal atheromatous calcifications in the aortic arch. Left subclavian approach dual chamber pacemaker has   right atrial appendage and right ventricular leads. Esophagus is decompressed. No enlarged mediastinal or hilar lymph nodes.    CORONARY  ARTERY CALCIFICATION: Moderate.    UPPER ABDOMEN: No actionable findings in the imaged upper abdomen. A few diverticula arise from the distal transverse/splenic flexure of the colon.    MUSCULOSKELETAL: Focal extrapleural soft tissue thickening is present in the right anterior chest at the level of the anterior fifth rib. No acute displaced rib fracture is present. Focal offset of the left anterior fourth rib cortex (series 4, image 90)   is more typical for a remote, healed rib fracture deformity.      Impression    IMPRESSION:     1.  No acute lung, airway, or pleural process.  2.  Moderate coronary atheromatous calcifications and dual-chamber pacemaker.  3.  Focal extrapleural soft tissue thickening right anterior chest near the fifth rib adjacent healed fracture deformity of the left anterior fourth rib but no acute fracture identified. Findings most likely reflect sequela of prior trauma. No acute rib   fracture or chest wall hematoma identified.     Head CT w/o contrast    [facility]  CT HEAD W/O CONTRAST, CT FACIAL BONES WITHOUT CONTRAST  3/28/2022 5:03 PM     INDICATION: Fall with head and facial trauma, amnestic to event; right periorbital bruising; on warfarin.   TECHNIQUE:   HEAD CT: Head CT without IV contrast. Dose reduction techniques were used.  FACIAL BONE AND MANDIBLE CT: Axial images were obtained through the facial bones and mandible and were evaluated in the axial plane with sagittal and coronal reformations. Dose reduction techniques were used.  SEDATION: None.  COMPARISON: None.    FINDINGS:  HEAD CT:   HEAD CT: No intracranial hemorrhage, extraaxial collection, mass effect or CT evidence of acute infarct.  Mild presumed chronic small vessel ischemic changes. Mild generalized volume loss. The ventricles are proportional to the sulci. Mild   atherosclerotic calcifications of the cavernous internal carotid arteries.. No acute calvarial fracture or scalp  hematoma. The mastoid air cells are free of significant disease.     FACIAL BONES AND MANDIBLE CT:  Mild preseptal periorbital soft tissue swelling without underlying acute fracture. No acute maxillofacial or mandibular fracture or traumatic dislocation elsewhere. Trace mucosal thickening in the right maxillary sinus and anterior ethmoid air cells.   Paranasal sinuses are otherwise clear. No acute dental trauma. The temporomandibular joints are normally aligned. The orbits are unremarkable.        Impression    CONCLUSION:  HEAD CT:  1.  No acute intracranial abnormality.  2.  No acute calvarial fracture or scalp hematoma.  3.  Mild global brain parenchymal volume loss with presumed sequelae of mild chronic small vessel ischemic disease.    FACIAL BONE AND MANDIBLE CT:  1.  Mild preseptal periorbital soft tissue swelling without underlying acute fracture.  2.  No acute maxillofacial or mandibular fracture or traumatic dislocation elsewhere..   CT Facial Bones without Contrast    [facility]  CT HEAD W/O CONTRAST, CT FACIAL BONES WITHOUT CONTRAST  3/28/2022 5:03 PM     INDICATION: Fall with head and facial trauma, amnestic to event; right periorbital bruising; on warfarin.   TECHNIQUE:   HEAD CT: Head CT without IV contrast. Dose reduction techniques were used.  FACIAL BONE AND MANDIBLE CT: Axial images were obtained through the facial bones and mandible and were evaluated in the axial plane with sagittal and coronal reformations. Dose reduction techniques were used.  SEDATION: None.  COMPARISON: None.    FINDINGS:  HEAD CT:   HEAD CT: No intracranial hemorrhage, extraaxial collection, mass effect or CT evidence of acute infarct.  Mild presumed chronic small vessel ischemic changes. Mild generalized volume loss. The ventricles are proportional to the sulci. Mild   atherosclerotic calcifications of the cavernous internal carotid arteries.. No acute calvarial fracture or scalp  hematoma. The mastoid air cells are free of significant disease.     FACIAL BONES AND MANDIBLE CT:  Mild preseptal periorbital soft tissue swelling without underlying acute fracture. No acute maxillofacial or mandibular fracture or traumatic dislocation elsewhere. Trace mucosal thickening in the right maxillary sinus and anterior ethmoid air cells.   Paranasal sinuses are otherwise clear. No acute dental trauma. The temporomandibular joints are normally aligned. The orbits are unremarkable.        Impression    CONCLUSION:  HEAD CT:  1.  No acute intracranial abnormality.  2.  No acute calvarial fracture or scalp hematoma.  3.  Mild global brain parenchymal volume loss with presumed sequelae of mild chronic small vessel ischemic disease.    FACIAL BONE AND MANDIBLE CT:  1.  Mild preseptal periorbital soft tissue swelling without underlying acute fracture.  2.  No acute maxillofacial or mandibular fracture or traumatic dislocation elsewhere..   Fingers XR, 2-3 views, left    Narrative    EXAM: XR FINGER LEFT G/E 2 VIEWS  LOCATION: [facility]  DATE/TIME: 3/28/2022 4:58 PM    INDICATION: fall, left PIP dislocation s p dislocation  COMPARISON: None.      Impression    IMPRESSION: 3 views of the middle finger show no dislocation at this time. There are mild degenerative changes of the IP joints. Soft tissue swelling at the PIP joint. No acute displaced fracture.    Severe arthritic changes triscaphe and first CMC joints.         PROCEDURES:   [facility]    -Dislocation - Upper Extremity    Date/Time: 3/28/2022 3:59 PM  Performed by: José Perez MD  Authorized by: José Perez MD     Risks, benefits and alternatives discussed.      UNIVERSAL PROTOCOL   Site Marked: NA  Prior Images Obtained and Reviewed:  NA  Required items: Required blood products, implants, devices and special equipment available    Patient identity confirmed:  Verbally with patient  NA -  No sedation, light sedation, or local anesthesia  Confirmation Checklist:  Patient's identity using two indicators  Universal Protocol: the Joint Commission Universal Protocol was followed      LOCATION     Location:  Finger    Finger location:  L long finger    Finger dislocation type: PIP      PRE PROCEDURE ASSESSMENT      Pre-procedure imaging:  None (clear dislocation on exam)    Distal perfusion: normal      ANESTHESIA (see MAR for exact dosages)      Anesthesia method:  None    PROCEDURE DETAILS      Manipulation performed: yes      Finger reduction method:  Direct traction    Reduction successful: yes      Reduction confirmed with imaging: yes      Immobilization:  Splint    Supplies used:  Aluminum splint    POST PROCEDURE DETAILS     Neurological function: normal      Distal perfusion: normal      Range of motion: improved        PROCEDURE    Patient Tolerance:  Patient tolerated the procedure well with no immediate complications     --------------------------------------------------------------------------------   Cardiac telemetry monitoring ordered, reviewed, and interpreted by me while patient was in the Emergency Department. Revealed rate-controlled atrial flutter with otherwise no acute abnormalities.  --------------------------------------------------------------------------------             I, Gloria Dias, am serving as a scribe to document services personally performed by Dr. José Perez based on my observation and the provider's statements to me. I, José Perez MD attest that Gloria Dias is acting in a scribe capacity, has observed my performance of the services and has documented them in accordance with my direction.      José Perez MD  03/28/22  Emergency Medicine  [facility] EMERGENCY ROOM  [address] [address] [phone]  Dept: [phone]      José Perez MD  03/28/22 7538

## 2022-03-28 NOTE — CONSULTS
Care Management Initial Consult    General Information  Assessment completed with: Carlos Rodriguez  Type of CM/SW Visit: Initial Assessment    Primary Care Provider verified and updated as needed: Yes   Readmission within the last 30 days: no previous admission in last 30 days      Reason for Consult: discharge planning  Advance Care Planning: Advance Care Planning Reviewed: verified with patient          Communication Assessment  Patient's communication style: spoken language (English or Bilingual)    Hearing Difficulty or Deaf: no   Wear Glasses or Blind: yes    Cognitive  Cognitive/Neuro/Behavioral:                        Living Environment:   People in home: spouse     Current living Arrangements: house      Able to return to prior arrangements: yes       Family/Social Support:  Care provided by: self  Provides care for: no one  Marital Status:   Wife          Description of Support System: Involved, Supportive    Support Assessment: Adequate social supports    Current Resources:   Patient receiving home care services: No     Community Resources: None  Equipment currently used at home: none  Supplies currently used at home: None    Employment/Financial:  Employment Status: retired        Financial Concerns: No concerns identified   Referral to Financial Worker: No       Lifestyle & Psychosocial Needs:  Social Determinants of Health     Tobacco Use: Not on file   Alcohol Use: Not on file   Financial Resource Strain: Not on file   Food Insecurity: Not on file   Transportation Needs: Not on file   Physical Activity: Not on file   Stress: Not on file   Social Connections: Not on file   Intimate Partner Violence: Not on file   Depression: Not on file   Housing Stability: Not on file       Functional Status:  Prior to admission patient needed assistance:   Dependent ADLs:: Independent  Dependent IADLs:: Independent  Assesssment of Functional Status: At functional baseline    Mental Health Status:  Mental Health  Status: No Current Concerns       Chemical Dependency Status:  Chemical Dependency Status: No Current Concerns             Values/Beliefs:  Spiritual, Cultural Beliefs, Mormonism Practices, Values that affect care: no               Additional Information:  TRACIE assessed. Pt resides at home with his wife, he reports independence at baseline and denies using medical equipment or supplies. Pt was out walking the dog today when he fell, however pt does no remember falling. Pt reports having 1-2 other falls in the last six months, stating that he loses his balance easily. He has not received any PT for this. Discharge goal to return home with family transport, may require PT.    Nitza Olvera LGSW

## 2022-03-28 NOTE — PHARMACY-ADMISSION MEDICATION HISTORY
Pharmacy Note - Admission Medication History    Pertinent Provider Information: none     ______________________________________________________________________    Prior To Admission (PTA) med list completed and updated in EMR.       PTA Med List   Medication Sig Last Dose     acetaminophen (TYLENOL) 325 MG tablet Take 325-650 mg by mouth every 6 hours as needed for mild pain Past Week at Unknown time     amLODIPine (NORVASC) 2.5 MG tablet Take 2.5 mg by mouth daily 3/28/2022 at Unknown time     aspirin 81 MG EC tablet Take 81 mg by mouth At Bedtime 3/28/2022 at Unknown time     atorvastatin (LIPITOR) 40 MG tablet Take 40 mg by mouth At Bedtime 3/27/2022 at Unknown time     azaTHIOprine (IMURAN) 50 MG tablet Take 50 mg by mouth daily 3/28/2022 at Unknown time     calcium polycarbophil (FIBERCON) 625 MG tablet Take 2 tablets by mouth daily 3/28/2022 at Unknown time     finasteride (PROSCAR) 5 MG tablet Take 5 mg by mouth At Bedtime 3/27/2022 at Unknown time     lisinopril (ZESTRIL) 20 MG tablet Take 20 mg by mouth daily 3/28/2022 at Unknown time     metoclopramide (REGLAN) 10 MG tablet Take 10 mg by mouth daily before breakfast 3/28/2022 at Unknown time     metoprolol succinate ER (TOPROL-XL) 25 MG 24 hr tablet Take 12.5 mg by mouth daily 3/28/2022 at Unknown time     multivitamin w/minerals (THERA-VIT-M) tablet Take 1 tablet by mouth daily 3/28/2022 at Unknown time     tamsulosin (FLOMAX) 0.4 MG capsule Take 0.8 mg by mouth At Bedtime 3/27/2022 at Unknown time     warfarin ANTICOAGULANT (COUMADIN) 7.5 MG tablet Take 7.5 mg by mouth At Bedtime 3/27/2022 at Unknown time       Information source(s): Patient, Family member, Clinic records, Hospital records and [facility]  Method of interview communication: in-person    Summary of Changes to PTA Med List  New: all      Patient was asked about OTC/herbal products specifically.  PTA med list reflects this.        The information provided in this note is only  as accurate as the sources available at the time of the update(s).    Thank you for the opportunity to participate in the care of this patient.    Julio Gallego RPH  3/28/2022 4:45 PM

## 2022-03-29 ENCOUNTER — APPOINTMENT (OUTPATIENT)
Dept: ULTRASOUND IMAGING | Facility: CLINIC | Age: 77
End: 2022-03-29
Attending: HOSPITALIST
Payer: MEDICARE

## 2022-03-29 VITALS
OXYGEN SATURATION: 94 % | DIASTOLIC BLOOD PRESSURE: 96 MMHG | TEMPERATURE: 98.7 F | SYSTOLIC BLOOD PRESSURE: 157 MMHG | HEART RATE: 87 BPM | RESPIRATION RATE: 16 BRPM

## 2022-03-29 LAB — INR PPP: 2.62 (ref 0.85–1.15)

## 2022-03-29 PROCEDURE — 93880 EXTRACRANIAL BILAT STUDY: CPT

## 2022-03-29 PROCEDURE — G0378 HOSPITAL OBSERVATION PER HR: HCPCS

## 2022-03-29 PROCEDURE — 250N000013 HC RX MED GY IP 250 OP 250 PS 637: Performed by: STUDENT IN AN ORGANIZED HEALTH CARE EDUCATION/TRAINING PROGRAM

## 2022-03-29 PROCEDURE — 85610 PROTHROMBIN TIME: CPT | Performed by: STUDENT IN AN ORGANIZED HEALTH CARE EDUCATION/TRAINING PROGRAM

## 2022-03-29 PROCEDURE — 999N000147 HC STATISTIC PT IP EVAL DEFER

## 2022-03-29 PROCEDURE — 99217 PR OBSERVATION CARE DISCHARGE: CPT | Performed by: HOSPITALIST

## 2022-03-29 PROCEDURE — 250N000012 HC RX MED GY IP 250 OP 636 PS 637: Performed by: STUDENT IN AN ORGANIZED HEALTH CARE EDUCATION/TRAINING PROGRAM

## 2022-03-29 PROCEDURE — 36415 COLL VENOUS BLD VENIPUNCTURE: CPT | Performed by: STUDENT IN AN ORGANIZED HEALTH CARE EDUCATION/TRAINING PROGRAM

## 2022-03-29 RX ORDER — WARFARIN SODIUM 7.5 MG/1
7.5 TABLET ORAL
Status: DISCONTINUED | OUTPATIENT
Start: 2022-03-29 | End: 2022-03-29 | Stop reason: HOSPADM

## 2022-03-29 RX ADMIN — ACETAMINOPHEN 650 MG: 325 TABLET, FILM COATED ORAL at 02:17

## 2022-03-29 RX ADMIN — AZATHIOPRINE 50 MG: 50 TABLET ORAL at 08:15

## 2022-03-29 RX ADMIN — ACETAMINOPHEN 650 MG: 325 TABLET, FILM COATED ORAL at 16:14

## 2022-03-29 RX ADMIN — METOPROLOL SUCCINATE 12.5 MG: 25 TABLET, EXTENDED RELEASE ORAL at 08:15

## 2022-03-29 RX ADMIN — METOCLOPRAMIDE 10 MG: 5 TABLET ORAL at 08:15

## 2022-03-29 RX ADMIN — ACETAMINOPHEN 650 MG: 325 TABLET, FILM COATED ORAL at 08:14

## 2022-03-29 RX ADMIN — LISINOPRIL 20 MG: 20 TABLET ORAL at 08:15

## 2022-03-29 RX ADMIN — OXYCODONE HYDROCHLORIDE 2.5 MG: 5 TABLET ORAL at 12:42

## 2022-03-29 NOTE — DISCHARGE SUMMARY
[facility]  Hospitalist Discharge Summary      Date of Admission:  3/28/2022  Date of Discharge:  3/29/2022  5:02 PM  Discharging Provider: Adolfo Cavazos MD  Discharge Service: Hospitalist Service    Discharge Diagnoses   Syncope vs near-syncope  Recurrence of orthostasis  Atrial fibrillation/atrial flutter   Tachy-wesly syndrome s/p pacemaker placement   Hypertension  Dislocation of left middle finger at PIP joint   R periorbital ecchymosis  History of CAD  BPH  Autoimmune hepatitis      Follow-ups Needed After Discharge   Follow-up Appointments     Follow-up and recommended labs and tests       PCP this week for hospitalization follow up  Cardiology for ongoing care             Unresulted Labs Ordered in the Past 30 Days of this Admission     No orders found for last 31 day(s).      These results will be followed up by NA    Discharge Disposition   Discharged to home  Condition at discharge: Stable      Hospital Course      Carlos Ch is a 76 year old male with past medical history significant for chronic ITP, atrial fibrillation/flutter (on warfarin), tachy-wesly syndrome s/p pacemaker placement, CAD s/p stent placement, HTN, HLD, and autoimmune hepatitis admitted on 3/28/2022 with syncope.      Syncope vs near-syncope  Recurrence of orthostasis  Atrial fibrillation/atrial flutter   Tachy-wesly syndrome s/p pacemaker placement   The patient presents to the ED with syncope. He was reportedly walking his dog this evening and felt lightheaded and fell down. He thinks he remembers falling to the ground. He remembers feeling lightheaded before. He has had 2-3 similar episodes in the past few months where he has felt lightheaded and fallen.   * Per cards notes, he apparently has has orthostatic symptoms in the past leading to a decrease in his blood pressure meds and a discontinuation of hydralazine.     * EKG in the ED showed atrial flutter with ventricular rate in the 80s, no ST  changes. Laboratory eval unremarkable. Trop undetectable  *most recent TTE from 2020 without significant valvular disease.   *suspect likely further orthostatic hypotension leading to presentation  - Continuous cardiac monitoring, orthostatic vital signs   - Pacemaker interrogation   - hold on card consult   - Continue PTA warfarin   - PT consulted - he was doing well enough that after screening for consideration of full PT evaluation, it was not felt necessary. He walked with RN staff and had very mild lightheadedness. He felt comfortable with the plan to discharge home with family support. After lengthy discussion, patient reports this being the third occurrence of [near] syncope in about 6 months. He reports having decreased appetite in the last 8 months or so and with that he has lost nearly 25 lbs.  He denies other symptoms like fevers or night sweats.  He will continue following with outpatient PCP and Cardiologist with whom he has good confidence and trust.  We discussed that he may be having lower blood pressure due to the weight loss.  We have held amlodipine and continue this at discharge.  PCP/cardiology follow up with review of his recorded blood pressure reading.  We discussed orthostatic hypotension which he objectively has but he denies major symptoms at those times.  He reports the 3 episodes have occurred while doing things like walking or trying to get into a car - never at rest or upon first arising. We discussed trial of compression stockings as well and he is considering it.      Orthostatic hypotension  Hypertension   Pt was markedly hypertensive upon arrival to the ED with blood pressure of 210/100.   - Continue PTA lisinopril and metoprolol   - as above     Dislocation of left middle finger at PIP joint   2/2 fall. Reduced in the ED and splinted.   - doing alright at time of discharge, no complaints     R periorbital ecchymosis  2/2 fall. CT head without acute intracranial pathology. CT of  the facial bones showed Mild preseptal periorbital soft tissue swelling without underlying acute fracture. No other fractures noted.   - PCP follow up as needed     Hx of CAD   - Continue PTA ASA   - Hold atorvastatin 2/2 obs status, resumed at discharge     BPH  - PTA finasteride and tamsulosin, continue     Autoimmune hepatitis  - continue PTA imuran    Consultations This Hospital Stay   PHYSICAL THERAPY ADULT IP CONSULT  PHARMACY TO DOSE WARFARIN    Code Status   Prior    Time Spent on this Encounter   I, Adolfo Cavazos MD, personally saw the patient today and spent greater than 30 minutes discharging this patient.       Adolfo Cavazos MD  [facility] EXTENDED RECOVERY AND SHORT STAY  [address] [address]  Phone: [phone]  ______________________________________________________________________    Physical Exam   Vital Signs: Temp: 98.7  F (37.1  C) Temp src: Oral BP: (!) 157/96 Pulse: 87   Resp: 16 SpO2: 94 % O2 Device: None (Room air)    Weight: 0 lbs 0 oz    Gen: NAD, pleasant  HEENT: EOMI, MMM, periorbital/facial ecchymoses appear stable, no bleeding  Resp: no crackles,  no wheezes, no increased work of resp  CV: S1S2 heard, reg rhythm, reg rate  Abdo: soft, nontender, nondistended, bowel sounds present  Ext: calves nontender, well perfused  Neuro: aaox3, CN grossly intact, no facial asymmetry         Primary Care Physician   [facility]    Discharge Orders      Reason for your hospital stay    Recurrence of [near] syncope     Follow-up and recommended labs and tests     PCP this week for hospitalization follow up  Cardiology for ongoing care     Activity    Your activity upon discharge: activity as tolerated     Discharge Instructions    You are being discharged with your amlodipine stopped.  You should check your blood pressure 1-2x per day and record it to report it to your outpatient doctors - you may need further adjustment in your  medications.  As we discussed, take extra time when going from lying down, to sitting/standing so that your body can adjust. Follow up with your PCP and Cardiologist.     Diet    Follow this diet upon discharge: Orders Placed This Encounter      Regular Diet Adult       Significant Results and Procedures   Most Recent 3 CBC's:Recent Labs   Lab Test 03/28/22  1619   WBC 7.4   HGB 17.1   MCV 93   *     Most Recent 3 BMP's:Recent Labs   Lab Test 03/28/22  1619      POTASSIUM 4.2   CHLORIDE 105   CO2 25   BUN 14   CR 1.04   ANIONGAP 12   LISANDRO 9.9   GLC 88   ,   Results for orders placed or performed during the hospital encounter of 03/28/22   US Carotid Bilateral    Narrative    US CAROTID BILATERAL   3/29/2022 2:39 PM    CLINICAL HISTORY: Carotid stenosis. recurrent syncope  TECHNIQUE: Duplex exam performed utilizing 2D gray-scale imaging,  Doppler interrogation with color-flow and spectral waveform analysis.    COMPARISON: None.    FINDINGS:  RIGHT: There is mild atheromatous plaque. Normal waveforms with no  significant stenosis.    LEFT: There is mild atheromatous plaque. Normal waveforms with no  significant stenosis.    Both vertebral arteries and subclavian artery waveforms are normal.    VELOCITY CHART:  The following velocities were obtained in the RIGHT carotid system.  CCA: 67 cm/s  ICA: 78 cm/s  ECA: 93 cm/s  ICA/CCA: PS 1.2    The following velocities were obtained in the LEFT carotid system.  CCA: 71 cm/s  ICA: 54 cm/s  ECA: 85 cm/s  ICA/CCA: PS 0.8      Impression    IMPRESSION:  1. Mild atheromatous plaque in the carotid arteries.  2. No significant stenosis on either side.    Evaluation based on velocities and NASCET criteria.    LACIE PLEITEZ MD         SYSTEM ID:  [id number]       Discharge Medications   Discharge Medication List as of 3/29/2022  4:49 PM      CONTINUE these medications which have NOT CHANGED    Details   acetaminophen (TYLENOL) 325 MG tablet Take 325-650 mg by mouth every 6  hours as needed for mild pain, Historical      aspirin 81 MG EC tablet Take 81 mg by mouth At Bedtime, Historical      atorvastatin (LIPITOR) 40 MG tablet Take 40 mg by mouth At Bedtime, Historical      azaTHIOprine (IMURAN) 50 MG tablet Take 50 mg by mouth daily, Historical      calcium polycarbophil (FIBERCON) 625 MG tablet Take 2 tablets by mouth daily, Historical      finasteride (PROSCAR) 5 MG tablet Take 5 mg by mouth At Bedtime, Historical      lisinopril (ZESTRIL) 20 MG tablet Take 20 mg by mouth daily, Historical      metoclopramide (REGLAN) 10 MG tablet Take 10 mg by mouth daily before breakfast, Historical      metoprolol succinate ER (TOPROL-XL) 25 MG 24 hr tablet Take 12.5 mg by mouth daily, Historical      multivitamin w/minerals (THERA-VIT-M) tablet Take 1 tablet by mouth daily, Historical      tamsulosin (FLOMAX) 0.4 MG capsule Take 0.8 mg by mouth At Bedtime, Historical      warfarin ANTICOAGULANT (COUMADIN) 7.5 MG tablet Take 7.5 mg by mouth At Bedtime, Historical         STOP taking these medications       amLODIPine (NORVASC) 2.5 MG tablet Comments:   Reason for Stopping:             Allergies   Allergies   Allergen Reactions     Clopidogrel Rash     Celecoxib Rash     Sulfa Drugs Rash

## 2022-03-29 NOTE — PROVIDER NOTIFICATION
MD Notification    Notified Person: MD    Notified Person Name:Dr. Rosas    Notification Date/Time:3/28/22 @2235    Notification Interaction:Amcom, talked on phone    Purpose of Notification:+ orthostatics    Orders Received: No new orders tonight, Medications adjusted for morning.    Comments:

## 2022-03-29 NOTE — PROGRESS NOTES
Observation goals  PRIOR TO DISCHARGE        Comments:   -diagnostic tests and consults completed and resulted: partially met   -vital signs normal or at patient baseline: met     Nurse to notify provider when observation goals have been met and patient is ready for discharge.

## 2022-03-29 NOTE — PLAN OF CARE
Orientation/Cognitive: A&Ox4  Observation Goals (Met/ Not Met): NOT MET  Mobility Level/Assist Equipment: SBA  Fall Risk (Y/N): Yes  Behavior Concerns: None  Pain Management: C/o R ribcage pain, declined interventions  Tele/VS/O2: +orthostatics; Tele- A-flutter  ABNL Lab/BG: None  Diet: Regular  Bowel/Bladder: Contient  Skin Concerns: Bruising around R eye, scattered bruising  Drains/Devices: PIV, S.l.  Tests/Procedures for next shift: PT consult, INR check, pacemaker interrogation.   Anticipated DC date & active delays: 3/29/22  Patient Stated Goal for Today: Go home     Observation goals  PRIOR TO DISCHARGE        Comments:   -diagnostic tests and consults completed and resulted- NOT MET   -vital signs normal or at patient baseline- NOT MET, + orthostatics  Nurse to notify provider when observation goals have been met and patient is ready for discharge.

## 2022-03-29 NOTE — PROGRESS NOTES
Observation goals  PRIOR TO DISCHARGE        Comments:   -diagnostic tests and consults completed and resulted: met   -vital signs normal or at patient baseline: met     Nurse to notify provider when observation goals have been met and patient is ready for discharge.           Gab

## 2022-03-29 NOTE — PLAN OF CARE
Orientation/Cognitive: A&Ox4  Observation Goals (Met/ Not Met): met  Mobility Level/Assist Equipment: SBA w/ walker  Fall Risk (Y/N): yes  Behavior Concerns: none  Pain Management: PRN Tylenol and Oxycodone   Tele/VS/O2: VSS on RA, Tele-aflutter and occasionally paced  ABNL Lab/BG: INR 2.62  Diet: regular diet  Bowel/Bladder: continent  Skin Concerns: none, bruising from fall  Drains/Devices: none  Tests/Procedures for next shift: n/a  Anticipated DC date & active delays: today  Patient Stated Goal for Today: discharge home     AVS reviewed with patient and family member. All questions answered. Patient belongings returned to patient. IV removed. Patient discharged to home with son and spouse.

## 2022-03-29 NOTE — PLAN OF CARE
PT: Orders received, chart reviewed, discussed with patient. Pt admitted under observation status with syncope. Pt is ambulating SBA to independent and moving safely, states very mild lightheadedness with transfers but quickly dissipates. Pt has no skilled PT needs at this time. PT to complete orders. Medical management needed only.

## 2022-03-29 NOTE — PROVIDER NOTIFICATION
MD Notification    Notified Person: MD    Notified Person Name: Dr. Cavazos    Notification Date/Time: 3/29/22 @1557    Notification Interaction: "[employer] messaging     Purpose of Notification: FYI ultrasound results are back. Patient refused to talk in hallway again    Orders Received:    Comments:

## 2022-03-29 NOTE — PROVIDER NOTIFICATION
MD Notification    Notified Person: MD    Notified Person Name: Dr. Ermelinda Mascorro    Notification Date/Time: 3/28/22; 2141    Notification Interaction: text page    Purpose of Notification:  MD informed that direct admit has arrive.     Orders Received:    Comments:

## 2022-03-29 NOTE — PROGRESS NOTES
Observation goals  PRIOR TO DISCHARGE        Comments:     -diagnostic tests and consults completed and resulted - Not met    -vital signs normal or at patient baseline - Not met,     Nurse to notify provider when observation goals have been met and patient is ready for discharge.

## 2022-03-29 NOTE — PROVIDER NOTIFICATION
"MD Notification    Notified Person: MD    Notified Person Name: Dr. Cavazos     Notification Date/Time: 3/29/22 @1252    Notification Interaction: [employer]    Purpose of Notification: Pt had orthostatic BP's at 1126 already, they were positive. Patient states he felt \"a little off\" when walking but is not dizzy  Orders Received:    Comments:    "

## 2022-03-29 NOTE — H&P
[facility]    History and Physical - Hospitalist Service       Date of Admission:  (Not on file)    Assessment & Plan      Carlos Ch is a 76 year old male with past medical history significant for chronic ITP, atrial fibrillation/flutter (on warfarin), tachy-wesly syndrome s/p pacemaker placement, CAD s/p stent placement, HTN, HLD, and autoimmune hepatitis admitted on 3/28/2022 with syncope.     Syncope vs near-syncope  Hx of orthostasis  Atrial fibrillation/atrial flutter   Tachy-wesly syndrome s/p pacemaker placement   The patient presents to the ED with syncope. He was reportedly walking his dog this evening and felt lightheaded and fell down. He thinks he remembers falling to the ground. He remembers feeling lightheaded before. He has had 2-3 similar episodes in the past few months where he has felt lightheaded and fallen.   * Per cards notes, he apparently has has orthostatic symptoms in the past leading to a decrease in his blood pressure meds and a discontinuation of hydralazine.     * EKG in the ED showed atrial flutter with ventricular rate in the 80s, no ST changes. Laboratory eval unremarkable. Trop undetectable  *most recent TTE from 2020 without significant valvular disease.   *suspect likely further orthostatic hypotension leading to presentation  - Continuous cardiac monitoring, orthostatic vital signs   - Pacemaker interrogation   - hold on card consult   - Continue PTA warfarin   - PT    Hypertension   Pt was markedly hypertensive upon arrival to the ED with blood pressure of 210/100.   - Continue PTA amlodipine, lisinopril and metoprolol     Addendum: patient orthostatic on RN check. Will hold amlodipine. May need to decrease lisinopril.    Dislocation of left middle finger at PIP joint   2/2 fall. Reduced in the ED and splinted.   - gunnariro    R periorbital ecchymosis  2/2 fall. CT head without acute intracranial pathology. CT of the facial bones showed Mild  preseptal periorbital soft tissue swelling without underlying acute fracture. No other fractures noted.   - monitor    Hx of CAD   - Continue PTA ASA   - Hold atorvastatin 2/2 obs status    BPH  - PTA finasteride and tamsulosin, continue    Autoimmune hepatitis  - continue PTA imuran     Diet: Regular Diet Adult regular  DVT Prophylaxis: Warfarin  Romero Catheter: Not present  Central Lines: None  Cardiac Monitoring: ACTIVE order. Indication: Syncope- high cardiac risk (48 hours)  Code Status: Full Code FULL    Clinically Significant Risk Factors Present on Admission               # Coagulation Defect: home medication list includes an anticoagulant medication  # Platelet Defect: home medication list includes an antiplatelet medication       Disposition Plan   Expected Discharge:    Anticipated discharge location:  Awaiting care coordination huddle  Delays:            The patient's care was discussed with the Bedside Nurse and Patient.    David Rosas MD  Hospitalist Service  [facility]  Securely message with the Vocera Web Console (learn more here)  Text page via [employer] Paging/Directory         ______________________________________________________________________    Chief Complaint   Near syncope    History is obtained from the patient, electronic health record     History of Present Illness   Carlos Ch is a 76 year old male who has a history of tachybradycardia syndrome status post pacemaker, history of orthostasis.  He presents to the hospital on 3/28/2022 after presenting to [facility] ED after a fall.  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  About 1/4 mile from his house, he felt lightheaded and fell to the ground.  He thinks he remembers the whole episode although he not cannot be quite certain.  He hit his left forehead and finger.  After being helped up by his neighbors, he was able to walk back to his house approximately 1/8 of a mile  without issue.  The dog did not pull him over.  Reportedly has had 2-3 similar episodes in the last several months.  On review of cardiology notes he does have a history of orthostasis for which his blood pressure meds were dictated decreased and his hydralazine was discontinued.  He denies any vertiginous symptoms, musculoskeletal weakness, or other complaints.  No, shortness of breath, chest pain, visual changes, nausea, vomiting, diarrhea, abdominal pain, or other associated symptoms.    Review of Systems    The 10 point Review of Systems is negative other than noted in the HPI or here.     Past Medical History    I have reviewed this patient's medical history and updated it with pertinent information if needed.   No past medical history on file.    Past Surgical History   I have reviewed this patient's surgical history and updated it with pertinent information if needed.  Past Surgical History:   Procedure Laterality Date     CV TEMPORARY PACEMAKER INSERTION         Social History   I have reviewed this patient's social history and updated it with pertinent information if needed.  Social History     Tobacco Use     Smoking status: None     Smokeless tobacco: None   Substance Use Topics     Alcohol use: None     Drug use: None       Family History   I have reviewed this patient's family history and updated it with pertinent information if needed.  Family History   Problem Relation Age of Onset     Diabetes Brother      Coronary Artery Disease Brother      Prior to Admission Medications   Prior to Admission Medications   Prescriptions Last Dose Informant Patient Reported? Taking?   acetaminophen (TYLENOL) 325 MG tablet Past Week at Unknown time Pharmacy Yes Yes   Sig: Take 325-650 mg by mouth every 6 hours as needed for mild pain   amLODIPine (NORVASC) 2.5 MG tablet 3/28/2022 at Unknown time Pharmacy Yes Yes   Sig: Take 2.5 mg by mouth daily   aspirin 81 MG EC tablet 3/28/2022 at Unknown time Pharmacy Yes Yes    Sig: Take 81 mg by mouth At Bedtime   atorvastatin (LIPITOR) 40 MG tablet 3/27/2022 at Unknown time Pharmacy Yes Yes   Sig: Take 40 mg by mouth At Bedtime   azaTHIOprine (IMURAN) 50 MG tablet 3/28/2022 at Unknown time Pharmacy Yes Yes   Sig: Take 50 mg by mouth daily   calcium polycarbophil (FIBERCON) 625 MG tablet 3/28/2022 at Unknown time Pharmacy Yes Yes   Sig: Take 2 tablets by mouth daily   finasteride (PROSCAR) 5 MG tablet 3/27/2022 at Unknown time Pharmacy Yes Yes   Sig: Take 5 mg by mouth At Bedtime   lisinopril (ZESTRIL) 20 MG tablet 3/28/2022 at Unknown time Pharmacy Yes Yes   Sig: Take 20 mg by mouth daily   metoclopramide (REGLAN) 10 MG tablet 3/28/2022 at Unknown time Pharmacy Yes Yes   Sig: Take 10 mg by mouth daily before breakfast   metoprolol succinate ER (TOPROL-XL) 25 MG 24 hr tablet 3/28/2022 at Unknown time Pharmacy Yes Yes   Sig: Take 12.5 mg by mouth daily   multivitamin w/minerals (THERA-VIT-M) tablet 3/28/2022 at Unknown time Pharmacy Yes Yes   Sig: Take 1 tablet by mouth daily   tamsulosin (FLOMAX) 0.4 MG capsule 3/27/2022 at Unknown time Pharmacy Yes Yes   Sig: Take 0.8 mg by mouth At Bedtime   warfarin ANTICOAGULANT (COUMADIN) 7.5 MG tablet 3/27/2022 at Unknown time Pharmacy Yes Yes   Sig: Take 7.5 mg by mouth At Bedtime      Facility-Administered Medications: None     Allergies   Allergies   Allergen Reactions     Clopidogrel Rash     Celecoxib Rash     Sulfa Drugs Rash       Physical Exam   Vital Signs: Temp: 97.8  F (36.6  C) Temp src: Oral BP: 128/79 Pulse: 92   Resp: 18 SpO2: 95 % O2 Device: None (Room air)    Weight: 0 lbs 0 oz    Constitutional: Awake, alert, cooperative, no apparent cardiopulmonary distress.  Eyes: Conjunctiva and pupils examined and normal.  HEENT: Moist mucous membranes, normal dentition.  Right periorbital ecchymotic lesions  Respiratory: Clear to auscultation bilaterally, no crackles or wheezing.  Cardiovascular: Regular rate and rhythm, normal S1 and S2,  and no murmur noted.  GI: Soft, non-distended, non-tender, normal bowel sounds.  Lymph/Hematologic: No anterior cervical or supraclavicular adenopathy.  Skin: No rashes, no cyanosis, no edema noted on exposed skin.  Musculoskeletal: No joint swelling, erythema or tenderness.  Left middle finger is in splint.  Neurologic: Cranial nerves 2-12 grossly intact, normal strength and sensation.  Patient is somewhat unsteady on feet and appears to drift from side to side while ambulating.  Psychiatric: Alert, oriented to person, place and time, no obvious anxiety or depression.      Data   Data reviewed today: I reviewed all medications, new labs and imaging results over the last 24 hours. I personally reviewed the chest CT image(s) showing no acute path and the head CT image(s) showing soft tissue injury of right periorbit but nothing deep and no fracture or bleed.    Recent Labs   Lab 03/28/22  1626 03/28/22  1619   WBC  --  7.4   HGB  --  17.1   MCV  --  93   PLT  --  111*   INR 2.70*  --    NA  --  142   POTASSIUM  --  4.2   CHLORIDE  --  105   CO2  --  25   BUN  --  14   CR  --  1.04   ANIONGAP  --  12   LISANDRO  --  9.9   GLC  --  88   ALBUMIN  --  4.2   PROTTOTAL  --  7.1   BILITOTAL  --  1.0   ALKPHOS  --  51   ALT  --  18   AST  --  24   LIPASE  --  59*     Recent Results (from the past 24 hour(s))   Chest CT w/o contrast    Narrative    EXAM: CT CHEST W/O CONTRAST  LOCATION: [facility]  DATE/TIME: 3/28/2022 5:04 PM    INDICATION: Right chest wall pain  COMPARISON: None.  TECHNIQUE: CT chest without IV contrast. Multiplanar reformats were obtained. Dose reduction techniques were used.  CONTRAST: None.    FINDINGS:   LUNGS AND PLEURA: No airspace opacities or interstitial thickening. There are a few punctate calcified nodules in both lungs consistent with benign granulomata and do not need additional workup or follow-up. Trachea and central airways are patent. No   pleural effusions are  present.    MEDIASTINUM: Cardiac chambers are normal in size. There is no pericardial effusion. Main pulmonary artery and thoracic aorta are normal caliber. Minimal atheromatous calcifications in the aortic arch. Left subclavian approach dual chamber pacemaker has   right atrial appendage and right ventricular leads. Esophagus is decompressed. No enlarged mediastinal or hilar lymph nodes.    CORONARY ARTERY CALCIFICATION: Moderate.    UPPER ABDOMEN: No actionable findings in the imaged upper abdomen. A few diverticula arise from the distal transverse/splenic flexure of the colon.    MUSCULOSKELETAL: Focal extrapleural soft tissue thickening is present in the right anterior chest at the level of the anterior fifth rib. No acute displaced rib fracture is present. Focal offset of the left anterior fourth rib cortex (series 4, image 90)   is more typical for a remote, healed rib fracture deformity.      Impression    IMPRESSION:     1.  No acute lung, airway, or pleural process.  2.  Moderate coronary atheromatous calcifications and dual-chamber pacemaker.  3.  Focal extrapleural soft tissue thickening right anterior chest near the fifth rib adjacent healed fracture deformity of the left anterior fourth rib but no acute fracture identified. Findings most likely reflect sequela of prior trauma. No acute rib   fracture or chest wall hematoma identified.     Head CT w/o contrast    [facility]  CT HEAD W/O CONTRAST, CT FACIAL BONES WITHOUT CONTRAST  3/28/2022 5:03 PM     INDICATION: Fall with head and facial trauma, amnestic to event; right periorbital bruising; on warfarin.   TECHNIQUE:   HEAD CT: Head CT without IV contrast. Dose reduction techniques were used.  FACIAL BONE AND MANDIBLE CT: Axial images were obtained through the facial bones and mandible and were evaluated in the axial plane with sagittal and coronal reformations. Dose reduction techniques were used.  SEDATION:  None.  COMPARISON: None.    FINDINGS:  HEAD CT:   HEAD CT: No intracranial hemorrhage, extraaxial collection, mass effect or CT evidence of acute infarct.  Mild presumed chronic small vessel ischemic changes. Mild generalized volume loss. The ventricles are proportional to the sulci. Mild   atherosclerotic calcifications of the cavernous internal carotid arteries.. No acute calvarial fracture or scalp hematoma. The mastoid air cells are free of significant disease.     FACIAL BONES AND MANDIBLE CT:  Mild preseptal periorbital soft tissue swelling without underlying acute fracture. No acute maxillofacial or mandibular fracture or traumatic dislocation elsewhere. Trace mucosal thickening in the right maxillary sinus and anterior ethmoid air cells.   Paranasal sinuses are otherwise clear. No acute dental trauma. The temporomandibular joints are normally aligned. The orbits are unremarkable.        Impression    CONCLUSION:  HEAD CT:  1.  No acute intracranial abnormality.  2.  No acute calvarial fracture or scalp hematoma.  3.  Mild global brain parenchymal volume loss with presumed sequelae of mild chronic small vessel ischemic disease.    FACIAL BONE AND MANDIBLE CT:  1.  Mild preseptal periorbital soft tissue swelling without underlying acute fracture.  2.  No acute maxillofacial or mandibular fracture or traumatic dislocation elsewhere..   CT Facial Bones without Contrast    [facility]  CT HEAD W/O CONTRAST, CT FACIAL BONES WITHOUT CONTRAST  3/28/2022 5:03 PM     INDICATION: Fall with head and facial trauma, amnestic to event; right periorbital bruising; on warfarin.   TECHNIQUE:   HEAD CT: Head CT without IV contrast. Dose reduction techniques were used.  FACIAL BONE AND MANDIBLE CT: Axial images were obtained through the facial bones and mandible and were evaluated in the axial plane with sagittal and coronal reformations. Dose reduction techniques were used.  SEDATION:  None.  COMPARISON: None.    FINDINGS:  HEAD CT:   HEAD CT: No intracranial hemorrhage, extraaxial collection, mass effect or CT evidence of acute infarct.  Mild presumed chronic small vessel ischemic changes. Mild generalized volume loss. The ventricles are proportional to the sulci. Mild   atherosclerotic calcifications of the cavernous internal carotid arteries.. No acute calvarial fracture or scalp hematoma. The mastoid air cells are free of significant disease.     FACIAL BONES AND MANDIBLE CT:  Mild preseptal periorbital soft tissue swelling without underlying acute fracture. No acute maxillofacial or mandibular fracture or traumatic dislocation elsewhere. Trace mucosal thickening in the right maxillary sinus and anterior ethmoid air cells.   Paranasal sinuses are otherwise clear. No acute dental trauma. The temporomandibular joints are normally aligned. The orbits are unremarkable.        Impression    CONCLUSION:  HEAD CT:  1.  No acute intracranial abnormality.  2.  No acute calvarial fracture or scalp hematoma.  3.  Mild global brain parenchymal volume loss with presumed sequelae of mild chronic small vessel ischemic disease.    FACIAL BONE AND MANDIBLE CT:  1.  Mild preseptal periorbital soft tissue swelling without underlying acute fracture.  2.  No acute maxillofacial or mandibular fracture or traumatic dislocation elsewhere..   Fingers XR, 2-3 views, left    Narrative    EXAM: XR FINGER LEFT G/E 2 VIEWS  LOCATION: [facility]  DATE/TIME: 3/28/2022 4:58 PM    INDICATION: fall, left PIP dislocation s p dislocation  COMPARISON: None.      Impression    IMPRESSION: 3 views of the middle finger show no dislocation at this time. There are mild degenerative changes of the IP joints. Soft tissue swelling at the PIP joint. No acute displaced fracture.    Severe arthritic changes triscaphe and first CMC joints.

## 2022-03-29 NOTE — PHARMACY-ANTICOAGULATION SERVICE
Clinical Pharmacy - Warfarin Dosing Consult     Pharmacy has been consulted to manage this patient s warfarin therapy.  Indication: Atrial Fibrillation  Therapy Goal: INR 2-3  OP Anticoag Clinic: [facility]  Warfarin Prior to Admission: Yes  Warfarin PTA Regimen: 7.5 mg by mouth at bedtime  Significant drug interactions: Aspirin and azathioprine   Recent documented change in oral intake/nutrition: Unknown    INR   Date Value Ref Range Status   03/28/2022 2.70 (H) 0.85 - 1.15 Final       Recommend warfarin 7.5 mg today.  Pharmacy will monitor Carlos Ch daily and order warfarin doses to achieve specified goal.      Please contact pharmacy as soon as possible if the warfarin needs to be held for a procedure or if the warfarin goals change.

## 2022-03-29 NOTE — PHARMACY-ADMISSION MEDICATION HISTORY
Pharmacy Medication History  Admission medication history interview status for the 3/28/2022  admission is complete. See EPIC admission navigator for prior to admission medications     Location of Interview: [facility]  Medication history sources: Med rec list was done today at [facility] by ED pharmacist.     Significant changes made to the medication list:  None    In the past week, patient estimated taking medication this percent of the time: greater than 90%    Additional medication history information:   None     Medication reconciliation completed by provider prior to medication history? No    Time spent in this activity: 5 minutes     Prior to Admission medications    Medication Sig Last Dose Taking? Auth Provider   acetaminophen (TYLENOL) 325 MG tablet Take 325-650 mg by mouth every 6 hours as needed for mild pain Past Week at Unknown time Yes Unknown, Entered By History   amLODIPine (NORVASC) 2.5 MG tablet Take 2.5 mg by mouth daily 3/28/2022 at Unknown time Yes Unknown, Entered By History   aspirin 81 MG EC tablet Take 81 mg by mouth At Bedtime 3/28/2022 at Unknown time Yes Unknown, Entered By History   atorvastatin (LIPITOR) 40 MG tablet Take 40 mg by mouth At Bedtime 3/27/2022 at Unknown time Yes Unknown, Entered By History   azaTHIOprine (IMURAN) 50 MG tablet Take 50 mg by mouth daily 3/28/2022 at Unknown time Yes Unknown, Entered By History   calcium polycarbophil (FIBERCON) 625 MG tablet Take 2 tablets by mouth daily 3/28/2022 at Unknown time Yes Unknown, Entered By History   finasteride (PROSCAR) 5 MG tablet Take 5 mg by mouth At Bedtime 3/27/2022 at Unknown time Yes Unknown, Entered By History   lisinopril (ZESTRIL) 20 MG tablet Take 20 mg by mouth daily 3/28/2022 at Unknown time Yes Unknown, Entered By History   metoclopramide (REGLAN) 10 MG tablet Take 10 mg by mouth daily before breakfast 3/28/2022 at Unknown time Yes Unknown, Entered  By History   metoprolol succinate ER (TOPROL-XL) 25 MG 24 hr tablet Take 12.5 mg by mouth daily 3/28/2022 at Unknown time Yes Unknown, Entered By History   multivitamin w/minerals (THERA-VIT-M) tablet Take 1 tablet by mouth daily 3/28/2022 at Unknown time Yes Unknown, Entered By History   tamsulosin (FLOMAX) 0.4 MG capsule Take 0.8 mg by mouth At Bedtime 3/27/2022 at Unknown time Yes Unknown, Entered By History   warfarin ANTICOAGULANT (COUMADIN) 7.5 MG tablet Take 7.5 mg by mouth At Bedtime 3/27/2022 at Unknown time Yes Unknown, Entered By History       The information provided in this note is only as accurate as the sources available at the time of update(s)

## 2022-03-29 NOTE — PLAN OF CARE
Orientation/Cognitive: A&Ox4  Observation Goals (Met/ Not Met): not met  Mobility Level/Assist Equipment: SBA  Fall Risk (Y/N): Y  Behavior Concerns: None  Pain Management: prn Tylenol c/o right ribcage  Tele/VS/O2: A-flutter, VSS on RA, ortho BP +  ABNL Lab/BG: INR in process, yesterday 2.70  Diet: regular   Bowel/Bladder: continent  Skin Concerns: right periorbital ecchymosis  Drains/Devices: PIV is sl'd  Tests/Procedures for next shift: Lab, Pacemaker interrogation, PT consult  Anticipated DC date & active delays: possible today once cleared  Patient Stated Goal for Today: to go home

## 2022-10-10 ENCOUNTER — OFFICE VISIT (OUTPATIENT)
Dept: NEUROLOGY | Facility: CLINIC | Age: 77
End: 2022-10-10
Payer: MEDICARE

## 2022-10-10 VITALS — SYSTOLIC BLOOD PRESSURE: 109 MMHG | HEART RATE: 84 BPM | DIASTOLIC BLOOD PRESSURE: 81 MMHG

## 2022-10-10 DIAGNOSIS — G20.A1 PARKINSON'S DISEASE (H): Primary | ICD-10-CM

## 2022-10-10 PROCEDURE — 99205 OFFICE O/P NEW HI 60 MIN: CPT | Performed by: PSYCHIATRY & NEUROLOGY

## 2022-10-10 PROCEDURE — 99417 PROLNG OP E/M EACH 15 MIN: CPT | Performed by: PSYCHIATRY & NEUROLOGY

## 2022-10-10 RX ORDER — CARBIDOPA AND LEVODOPA 25; 100 MG/1; MG/1
1 TABLET ORAL 3 TIMES DAILY
Qty: 270 TABLET | Refills: 0 | Status: SHIPPED | OUTPATIENT
Start: 2022-10-10 | End: 2022-11-10

## 2022-10-10 ASSESSMENT — PATIENT HEALTH QUESTIONNAIRE - PHQ9: SUM OF ALL RESPONSES TO PHQ QUESTIONS 1-9: 14

## 2022-10-10 NOTE — NURSING NOTE
Depression Response    Patient completed the PHQ-9 assessment for depression and scored >9? Yes  Question 9 on the PHQ-9 was positive for suicidality? No  Does patient have current mental health provider? No    Is this a virtual visit? No    I personally notified the following: visit provider     Chief Complaint   Patient presents with     Consult For     Fatigue  Falling sensation/fell two times  Shuffling  Difficulty getting fork/spoon to mouth  Difficulty getting in and out of bed/car

## 2022-10-10 NOTE — LETTER
10/10/2022         RE: Carlos Ch  [address] [facility] [address]        Dear Colleague,    Thank you for referring your patient, Carlos Ch, to the [facility]. Please see a copy of my visit note below.    [facility] Neurology Consultation    Carlos Ch MRN# [id number]   Age: 77 year old YOB: 1945     Requesting physician: Referred [facility]  Clinic, [facility] [employer]     Reason for Consultation: balance concerns      History of Presenting Symptoms:   Carlos Ch is a 77 year old male who presents today for evaluation of balance concerns.  The patient has a pertinent medical history of HTN, BPH, Idiopathic thrombocytopenic purpura, autoimmune hepatitis, paroxysmal Atrial fibrillation on warfarin, a tachy-wesly syndrome s/p pacemaker, and a polyclonal gammopathy.      The patient was seen in the ED 3/28/2022 after being found/waking up on the ground during a walk with his dog.  He was not confused upon waking, and didn't recall passing out.  In the ED, he had BP of 210/100, right periorbital edema, and a left middle finger dislocation.  CT head was without pertinent findings (no ICH, no SDH). Upon admission to [facility] later that day, he reported that he did feel light-headed prior to his fall.  There was concern for blood pressure fluctuations leading to his fall (syncope or orthostatic hypotension) and his amlodipine was held.  US carotids were noted to be normal.    There is a visit with his PCP 10/4/2022 but I cannot access it today.  On list of diagnosis/concerns, there is report of shuffling gait, peripheral neuropathy.    Today, the patient is here with his wife.  They indicate that he falls often with any major or even minor health issue.  Infection, weight loss have all triggered falls.  He feels like his falling recently isn't necessarily similar to syncope from heart related A-fib issues. They feel that his overall symptom of imbalance  started around the move of the house (no strength, difficulty walking), possibly even at 12/2020.  Symptoms of feeling off balance, weak, slowed walking worsened since that time to today in terms of severity.  He feels like he is loosing the volume of his voice.  There is no chewing or swallowing issues. 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   There are no issues of drooling. He has no visual hallucinations. There is no report of dream enactment behavior.  There are no atypical bursting movements noted.  He feels his walking speed has slowed and that his steps are short.  He feels like he is walking in a tipping forward type manner.  He can have issues of feeling light-headed when standing.      He does tell me he was see in [facility] neurology clinic, but told he has a neuropathy.  It was unclear if there was mention of any concern for parkinson's disease.  I do not have access to these records today.     Social History:   Moved to [place] from [place] in 08/2021.       Medications:   ASA  Atorvastatin  Azathioprine  Lisionprok  Reglan  Metoprolol  Tamsulosin  Warfarin     Physical Exam:   Vitals: /81 (BP Location: Right arm, Patient Position: Sitting, Cuff Size: Adult Regular)   Pulse 84    General: Seated comfortably in no acute distress.  HEENT: Neck supple with normal range of motion. No paracervical muscle tenderness or tightness.  Optic discs sharp and vasculature normal on funduscopic exam.   Skin: No rashes  Neurologic:     Mental Status: Fully alert, attentive and oriented. Speech clear and fluent, but low volume of voice. Can make emotive smile at times. Glabellar +.      Cranial Nerves: Visual fields intact to threat. PERRL. EOMI with normal smooth pursuit. Facial sensation intact/symmetric. Facial movements symmetric. Hearing not formally tested but intact to conversation. Palate elevation symmetric, uvula midline. No  dysarthria. Shoulder shrug strong bilaterally. Tongue protrusion midline. Rapid movements of tongue slowed and clumsy.     Motor: Mild resting tremor on right hand with distraction, not pill rolling but instead wrist pronation/supination.  No pronator drift. Slowed finger tapping, open-closed hand testing b/l.  Increased rigidity in b/l upper extremities (R>L). Strength 5/5 throughout upper and lower extremities.     Deep Tendon Reflexes: 2+/symmetric throughout upper extremities, but 1+ at patellar, and absent at achilles. No clonus. Toes downgoing bilaterally.     Sensory: Intact/symmetric to light touch, pinprick, temperature, vibration and proprioception throughout upper extremities b/l.  Lower extremities with no vibraiton at toes, MM, and reduced b/l at knees (8 seconds).  Pinprick testing intact on right toes but not on left, present otherwise in all other locations. Positive Romberg.      Coordination: Finger-nose-finger dysmetria. Rapid alternating movements intact/symmetric with slowed speed b/l.      Gait: Cannot stand with arms across chest. Stooped posture. Moderate base (6+ inches between heels). Stride is short, doesn't scrape heel or toe. Multiple tiny steps to turn around. No arm swing, Pullback testing+ x2/3.           Assessment and Plan:   Assessment:  Distal symmetric polyneuropathy, sensory  Parkinsonism    The patient has an exam consistent with parkinsonism, but an onset that seems relatively quick comparatively.  I do wonder if some of his symptoms are exacerbated by daily use of metoclopramide, which is known to lead to EPS.  His parkinsonism can explain his slowed walking, issues of imbalance related to surprising stimuli, change in handwriting, but I do not think it is the cause of his imbalance entirely.  He has a severe sensorimotor polyneuropathy present that is also leading to sensorimotor ataxia.  There doesn't seem to ban an element of pure autonomic failure given lack of  orthostatics or other related autonomic functions (GI, sweat, drool, eye dilatation).  I think at this time, treatment for PD symptoms can consist of a sinemet trial and discontinuation of his Reglan.  Close follow up afterwards will allow for us to address his neuropathy to a better degree, especially given he already had some degree of a serum w/up for an etiology.       Plan:  - Discontinue Reglan if using daily  - Sinemet 25/100 TID for 2-4 weeks  - PT for gait and mobility pending sinemet response    Follow up in Neurology clinic in 2-3 months, or should new concerns arise.    YORDY Gusman D.O.   of Neurology    Total time today (91 min) in this patient encounter was spent on pre-charting, counseling and/or coordination of care.  The patient is in agreement with this plan and has no further questions.        Again, thank you for allowing me to participate in the care of your patient.        Sincerely,        Marlo Gusman, DO

## 2022-10-10 NOTE — PATIENT INSTRUCTIONS
- Discontinue Reglan if using daily  - Consider alternative to tamsulosin  - Start Sinemet for suspected parkinson's disease   - Sinemet 25/100 three times daily for at least 3-4 weeks  - PT for gait and mobility

## 2022-10-10 NOTE — PROGRESS NOTES
[facility] Neurology Consultation    Carlos Ch MRN# [id number]   Age: 77 year old YOB: 1945     Requesting physician: Referred [facility]  Clinic, [facility]     Reason for Consultation: balance concerns      History of Presenting Symptoms:   Carlos Ch is a 77 year old male who presents today for evaluation of balance concerns.  The patient has a pertinent medical history of HTN, BPH, Idiopathic thrombocytopenic purpura, autoimmune hepatitis, paroxysmal Atrial fibrillation on warfarin, a tachy-wesly syndrome s/p pacemaker, and a polyclonal gammopathy.      The patient was seen in the ED 3/28/2022 after being found/waking up on the ground during a walk with his dog.  He was not confused upon waking, and didn't recall passing out.  In the ED, he had BP of 210/100, right periorbital edema, and a left middle finger dislocation.  CT head was without pertinent findings (no ICH, no SDH). Upon admission to [facility] later that day, he reported that he did feel light-headed prior to his fall.  There was concern for blood pressure fluctuations leading to his fall (syncope or orthostatic hypotension) and his amlodipine was held.  US carotids were noted to be normal.    There is a visit with his PCP 10/4/2022 but I cannot access it today.  On list of diagnosis/concerns, there is report of shuffling gait, peripheral neuropathy.    Today, the patient is here with his wife.  They indicate that he falls often with any major or even minor health issue.  Infection, weight loss have all triggered falls.  He feels like his falling recently isn't necessarily similar to syncope from heart related A-fib issues. They feel that his overall symptom of imbalance started around the move of the house (no strength, difficulty walking), possibly even at 12/2020.  Symptoms of feeling off balance, weak, slowed walking worsened since that time to today in terms of severity.  He feels like he is loosing the volume of his voice.   There is no chewing or swallowing issues. 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   There are no issues of drooling. He has no visual hallucinations. There is no report of dream enactment behavior.  There are no atypical bursting movements noted.  He feels his walking speed has slowed and that his steps are short.  He feels like he is walking in a tipping forward type manner.  He can have issues of feeling light-headed when standing.      He does tell me he was see in [facility] neurology clinic, but told he has a neuropathy.  It was unclear if there was mention of any concern for parkinson's disease.  I do not have access to these records today.     Social History:   Moved to [place] from [place] in 08/2021.       Medications:   ASA  Atorvastatin  Azathioprine  Lisionprok  Reglan  Metoprolol  Tamsulosin  Warfarin     Physical Exam:   Vitals: /81 (BP Location: Right arm, Patient Position: Sitting, Cuff Size: Adult Regular)   Pulse 84    General: Seated comfortably in no acute distress.  HEENT: Neck supple with normal range of motion. No paracervical muscle tenderness or tightness.  Optic discs sharp and vasculature normal on funduscopic exam.   Skin: No rashes  Neurologic:     Mental Status: Fully alert, attentive and oriented. Speech clear and fluent, but low volume of voice. Can make emotive smile at times. Glabellar +.      Cranial Nerves: Visual fields intact to threat. PERRL. EOMI with normal smooth pursuit. Facial sensation intact/symmetric. Facial movements symmetric. Hearing not formally tested but intact to conversation. Palate elevation symmetric, uvula midline. No dysarthria. Shoulder shrug strong bilaterally. Tongue protrusion midline. Rapid movements of tongue slowed and clumsy.     Motor: Mild resting tremor on right hand with distraction, not pill rolling but instead wrist pronation/supination.  No pronator drift. Slowed  finger tapping, open-closed hand testing b/l.  Increased rigidity in b/l upper extremities (R>L). Strength 5/5 throughout upper and lower extremities.     Deep Tendon Reflexes: 2+/symmetric throughout upper extremities, but 1+ at patellar, and absent at achilles. No clonus. Toes downgoing bilaterally.     Sensory: Intact/symmetric to light touch, pinprick, temperature, vibration and proprioception throughout upper extremities b/l.  Lower extremities with no vibraiton at toes, MM, and reduced b/l at knees (8 seconds).  Pinprick testing intact on right toes but not on left, present otherwise in all other locations. Positive Romberg.      Coordination: Finger-nose-finger dysmetria. Rapid alternating movements intact/symmetric with slowed speed b/l.      Gait: Cannot stand with arms across chest. Stooped posture. Moderate base (6+ inches between heels). Stride is short, doesn't scrape heel or toe. Multiple tiny steps to turn around. No arm swing, Pullback testing+ x2/3.           Assessment and Plan:   Assessment:  Distal symmetric polyneuropathy, sensory  Parkinsonism    The patient has an exam consistent with parkinsonism, but an onset that seems relatively quick comparatively.  I do wonder if some of his symptoms are exacerbated by daily use of metoclopramide, which is known to lead to EPS.  His parkinsonism can explain his slowed walking, issues of imbalance related to surprising stimuli, change in handwriting, but I do not think it is the cause of his imbalance entirely.  He has a severe sensorimotor polyneuropathy present that is also leading to sensorimotor ataxia.  There doesn't seem to ban an element of pure autonomic failure given lack of orthostatics or other related autonomic functions (GI, sweat, drool, eye dilatation).  I think at this time, treatment for PD symptoms can consist of a sinemet trial and discontinuation of his Reglan.  Close follow up afterwards will allow for us to address his neuropathy to  a better degree, especially given he already had some degree of a serum w/up for an etiology.       Plan:  - Discontinue Reglan if using daily  - Sinemet 25/100 TID for 2-4 weeks  - PT for gait and mobility pending sinemet response    Follow up in Neurology clinic in 2-3 months, or should new concerns arise.    YORDY Gusman D.O.   of Neurology    Total time today (91 min) in this patient encounter was spent on pre-charting, counseling and/or coordination of care.  The patient is in agreement with this plan and has no further questions.

## 2022-10-20 ENCOUNTER — TELEPHONE (OUTPATIENT)
Dept: NEUROLOGY | Facility: CLINIC | Age: 77
End: 2022-10-20

## 2022-10-20 NOTE — TELEPHONE ENCOUNTER
[facility]    Phone Message    May a detailed message be left on voicemail: yes     Reason for Call: Symptoms or Concerns     If patient has red-flag symptoms, warm transfer to triage line    Current symptom or concern: vertigo     Symptoms have been present for:  5 day(s)    Has patient previously been seen for this? Yes    By : Dr. Gusman    Date: 10/20/22    Are there any new or worsening symptoms? Yes: Pt called to speak to Dr. Gusman about the vertigo that Pt has been experiencing  for the last 5 days.  Pt states that it was hard to get out of bed this morning and that it seems to be getting worse.    Please call Pt back at [phone] to discuss further.      Action Taken: Message routed to:  [facility]    Travel Screening: Not Applicable

## 2022-10-20 NOTE — TELEPHONE ENCOUNTER
Called pt and he stated he has vertigo symptoms- room is spinning.Feeling off balance. No difference in spinning feeling if he closes his eyes. Has not taken any meds for this.  He stated he had an episode similar to this 10 years ago and then went to physical therapy where they did a maneuver and symptoms went away. Pt stated symptoms started shortly after starting Carb/Levo 25/100 take 1 tab TID.Informed pt Dr. Gusman to review and advise and then we will let him know.

## 2022-10-21 NOTE — TELEPHONE ENCOUNTER
The patient can discontinue Sinemet for now, and otherwise could report to an urgent care for further guidance on whether there is an ongoing benign positional vertigo.     Thanks,   YORDY       Called pt and informed him Dr. Gusman's note verbatim. Advised pt to call back if he has questions. Clinic # given.

## 2022-11-10 ENCOUNTER — HOSPITAL ENCOUNTER (OUTPATIENT)
Facility: CLINIC | Age: 77
Setting detail: OBSERVATION
Discharge: NURSING, REHAB OR LONG-TERM CARE | End: 2022-11-12
Attending: EMERGENCY MEDICINE
Payer: MEDICARE

## 2022-11-10 ENCOUNTER — APPOINTMENT (OUTPATIENT)
Dept: CT IMAGING | Facility: CLINIC | Age: 77
End: 2022-11-10
Attending: EMERGENCY MEDICINE
Payer: MEDICARE

## 2022-11-10 DIAGNOSIS — S32.9XXA CLOSED NONDISPLACED FRACTURE OF PELVIS, UNSPECIFIED PART OF PELVIS, INITIAL ENCOUNTER (H): ICD-10-CM

## 2022-11-10 LAB
ALBUMIN SERPL-MCNC: 3.7 G/DL (ref 3.5–5)
ALP SERPL-CCNC: 47 U/L (ref 45–120)
ALT SERPL W P-5'-P-CCNC: 13 U/L (ref 0–45)
ANION GAP SERPL CALCULATED.3IONS-SCNC: 9 MMOL/L (ref 5–18)
AST SERPL W P-5'-P-CCNC: 20 U/L (ref 0–40)
BASOPHILS # BLD AUTO: 0 10E3/UL (ref 0–0.2)
BASOPHILS NFR BLD AUTO: 0 %
BILIRUB SERPL-MCNC: 0.8 MG/DL (ref 0–1)
BUN SERPL-MCNC: 18 MG/DL (ref 8–28)
CALCIUM SERPL-MCNC: 9.8 MG/DL (ref 8.5–10.5)
CHLORIDE BLD-SCNC: 107 MMOL/L (ref 98–107)
CO2 SERPL-SCNC: 28 MMOL/L (ref 22–31)
CREAT SERPL-MCNC: 0.83 MG/DL (ref 0.7–1.3)
EOSINOPHIL # BLD AUTO: 0 10E3/UL (ref 0–0.7)
EOSINOPHIL NFR BLD AUTO: 1 %
ERYTHROCYTE [DISTWIDTH] IN BLOOD BY AUTOMATED COUNT: 13 % (ref 10–15)
FLUAV RNA SPEC QL NAA+PROBE: NEGATIVE
FLUBV RNA RESP QL NAA+PROBE: NEGATIVE
GFR SERPL CREATININE-BSD FRML MDRD: 90 ML/MIN/1.73M2
GLUCOSE BLD-MCNC: 98 MG/DL (ref 70–125)
HCT VFR BLD AUTO: 45.6 % (ref 40–53)
HGB BLD-MCNC: 15.4 G/DL (ref 13.3–17.7)
IMM GRANULOCYTES # BLD: 0 10E3/UL
IMM GRANULOCYTES NFR BLD: 1 %
INR PPP: 2.55 (ref 0.85–1.15)
LYMPHOCYTES # BLD AUTO: 1.3 10E3/UL (ref 0.8–5.3)
LYMPHOCYTES NFR BLD AUTO: 20 %
MCH RBC QN AUTO: 31.7 PG (ref 26.5–33)
MCHC RBC AUTO-ENTMCNC: 33.8 G/DL (ref 31.5–36.5)
MCV RBC AUTO: 94 FL (ref 78–100)
MONOCYTES # BLD AUTO: 0.7 10E3/UL (ref 0–1.3)
MONOCYTES NFR BLD AUTO: 10 %
NEUTROPHILS # BLD AUTO: 4.6 10E3/UL (ref 1.6–8.3)
NEUTROPHILS NFR BLD AUTO: 68 %
NRBC # BLD AUTO: 0 10E3/UL
NRBC BLD AUTO-RTO: 0 /100
PLATELET # BLD AUTO: 98 10E3/UL (ref 150–450)
POTASSIUM BLD-SCNC: 3.8 MMOL/L (ref 3.5–5)
PROT SERPL-MCNC: 6.3 G/DL (ref 6–8)
RBC # BLD AUTO: 4.86 10E6/UL (ref 4.4–5.9)
RSV RNA SPEC NAA+PROBE: NEGATIVE
SARS-COV-2 RNA RESP QL NAA+PROBE: NEGATIVE
SODIUM SERPL-SCNC: 144 MMOL/L (ref 136–145)
WBC # BLD AUTO: 6.6 10E3/UL (ref 4–11)

## 2022-11-10 PROCEDURE — 99285 EMERGENCY DEPT VISIT HI MDM: CPT | Mod: 25

## 2022-11-10 PROCEDURE — 85610 PROTHROMBIN TIME: CPT | Performed by: EMERGENCY MEDICINE

## 2022-11-10 PROCEDURE — 250N000013 HC RX MED GY IP 250 OP 250 PS 637: Performed by: EMERGENCY MEDICINE

## 2022-11-10 PROCEDURE — 87637 SARSCOV2&INF A&B&RSV AMP PRB: CPT | Performed by: EMERGENCY MEDICINE

## 2022-11-10 PROCEDURE — 85025 COMPLETE CBC W/AUTO DIFF WBC: CPT | Performed by: EMERGENCY MEDICINE

## 2022-11-10 PROCEDURE — 80053 COMPREHEN METABOLIC PANEL: CPT | Performed by: EMERGENCY MEDICINE

## 2022-11-10 PROCEDURE — G1010 CDSM STANSON: HCPCS

## 2022-11-10 PROCEDURE — 82040 ASSAY OF SERUM ALBUMIN: CPT | Performed by: EMERGENCY MEDICINE

## 2022-11-10 PROCEDURE — C9803 HOPD COVID-19 SPEC COLLECT: HCPCS

## 2022-11-10 PROCEDURE — 120N000001 HC R&B MED SURG/OB

## 2022-11-10 PROCEDURE — 99223 1ST HOSP IP/OBS HIGH 75: CPT | Mod: AI | Performed by: STUDENT IN AN ORGANIZED HEALTH CARE EDUCATION/TRAINING PROGRAM

## 2022-11-10 PROCEDURE — 36415 COLL VENOUS BLD VENIPUNCTURE: CPT | Performed by: EMERGENCY MEDICINE

## 2022-11-10 RX ORDER — OXYCODONE HYDROCHLORIDE 5 MG/1
5 TABLET ORAL ONCE
Status: COMPLETED | OUTPATIENT
Start: 2022-11-10 | End: 2022-11-10

## 2022-11-10 RX ORDER — TERAZOSIN 1 MG/1
1 CAPSULE ORAL AT BEDTIME
Status: DISCONTINUED | OUTPATIENT
Start: 2022-11-11 | End: 2022-11-12 | Stop reason: HOSPADM

## 2022-11-10 RX ORDER — AZATHIOPRINE 50 MG/1
50 TABLET ORAL DAILY
Status: DISCONTINUED | OUTPATIENT
Start: 2022-11-11 | End: 2022-11-12 | Stop reason: HOSPADM

## 2022-11-10 RX ORDER — METOCLOPRAMIDE 10 MG/1
10 TABLET ORAL
Status: DISCONTINUED | OUTPATIENT
Start: 2022-11-11 | End: 2022-11-10

## 2022-11-10 RX ORDER — ONDANSETRON 4 MG/1
4 TABLET, ORALLY DISINTEGRATING ORAL EVERY 8 HOURS PRN
COMMUNITY
End: 2022-11-23

## 2022-11-10 RX ORDER — TERAZOSIN 1 MG/1
1 CAPSULE ORAL AT BEDTIME
COMMUNITY

## 2022-11-10 RX ORDER — LIDOCAINE 40 MG/G
CREAM TOPICAL
Status: DISCONTINUED | OUTPATIENT
Start: 2022-11-10 | End: 2022-11-12 | Stop reason: HOSPADM

## 2022-11-10 RX ORDER — ONDANSETRON 4 MG/1
4 TABLET, ORALLY DISINTEGRATING ORAL EVERY 6 HOURS PRN
Status: DISCONTINUED | OUTPATIENT
Start: 2022-11-10 | End: 2022-11-12 | Stop reason: HOSPADM

## 2022-11-10 RX ORDER — LISINOPRIL 20 MG/1
20 TABLET ORAL DAILY
Status: DISCONTINUED | OUTPATIENT
Start: 2022-11-11 | End: 2022-11-12 | Stop reason: HOSPADM

## 2022-11-10 RX ORDER — ONDANSETRON 2 MG/ML
4 INJECTION INTRAMUSCULAR; INTRAVENOUS EVERY 6 HOURS PRN
Status: DISCONTINUED | OUTPATIENT
Start: 2022-11-10 | End: 2022-11-12 | Stop reason: HOSPADM

## 2022-11-10 RX ORDER — ATORVASTATIN CALCIUM 40 MG/1
40 TABLET, FILM COATED ORAL AT BEDTIME
Status: DISCONTINUED | OUTPATIENT
Start: 2022-11-11 | End: 2022-11-12 | Stop reason: HOSPADM

## 2022-11-10 RX ORDER — ONDANSETRON 4 MG/1
4 TABLET, ORALLY DISINTEGRATING ORAL EVERY 8 HOURS PRN
Status: DISCONTINUED | OUTPATIENT
Start: 2022-11-10 | End: 2022-11-12 | Stop reason: HOSPADM

## 2022-11-10 RX ORDER — HYDROMORPHONE HCL IN WATER/PF 6 MG/30 ML
0.2 PATIENT CONTROLLED ANALGESIA SYRINGE INTRAVENOUS
Status: DISCONTINUED | OUTPATIENT
Start: 2022-11-10 | End: 2022-11-12 | Stop reason: HOSPADM

## 2022-11-10 RX ADMIN — OXYCODONE HYDROCHLORIDE 5 MG: 5 TABLET ORAL at 16:59

## 2022-11-10 ASSESSMENT — ACTIVITIES OF DAILY LIVING (ADL)
ADLS_ACUITY_SCORE: 35
ADLS_ACUITY_SCORE: 37

## 2022-11-10 NOTE — ED TRIAGE NOTES
Pt arrive via EMS with right hip pain after a fall on Monday. Went to clinic on 11/8 and had x rays which showed no fractures. Today he got up to his recliner this AM and has been unable to get up since. No shortening. Does take warfarin but denies hitting his head.      Triage Assessment     Row Name 11/10/22 9921       Triage Assessment (Adult)    Airway WDL WDL       Respiratory WDL    Respiratory WDL WDL       Skin Circulation/Temperature WDL    Skin Circulation/Temperature WDL WDL       Cardiac WDL    Cardiac WDL WDL       Peripheral/Neurovascular WDL    Peripheral Neurovascular WDL WDL       Cognitive/Neuro/Behavioral WDL    Cognitive/Neuro/Behavioral WDL WDL

## 2022-11-10 NOTE — ED PROVIDER NOTES
EMERGENCY DEPARTMENT ENCOUNTER            IMPRESSION:  Pelvic fracture      MEDICAL DECISION MAKING:  Patient evaluated for pelvic and groin pain after a fall.  He was seen earlier this week and had negative outpatient work-up.  He is unable to bear weight.    On exam his blood pressure is elevated.  He is comfortable when still.  He has pain with right lower extremity range of motion and loadbearing    He was given pain medication    CT shows evidence of pelvic fracture    INR is 2.55    Labs and virus panel otherwise normal.    Patient will be admitted to hospital for pain control, orthopedic consult and likely TCU placement.    I spoke to the residency service for admission        =================================================================  CHIEF COMPLAINT:  Chief Complaint   Patient presents with     Fall     Hip Pain         HPI  Carlos Ch is a 77 year old male with a history of neuropathy, hypertension, ITP, and paroxysmal atrial fibrillation who presents to the ED by EMS for evaluation of a fall.    Per chart review: Patient was seen at [facility] on 11/8 for evaluation of a fall. XR for right femur, shoulder, and hip all negative for fracture. Was told to follow up with orthopedics in 4-10 days, ice, and continue tylenol upon discharge.     Patient reports that he fell three days ago when he lost his balance landing on his right side. Since he has had right hip pain and has been unable to bear weight due to pain. Patient was seen at Urgent care two days ago and was told his x-ray showed no fractures. Patient has been taking tylenol for the pain without relief. He denies hitting his head or any rib pain from the fall. Denies any other complaints at this time.     I, Dustin Vernon am serving as a scribe to document services personally performed by Dr. Jace Alvarado MD, based on my observation and the provider's statements to me. I, Dr. Jace Alvarado MD attest that Dustin Vernon is acting  in a scribe capacity, has observed my performance of the services and has documented them in accordance with my direction.      REVIEW OF SYSTEMS   Constitutional: Denies fever, chills, unintentional weight loss or fatigue   Eyes: Denies visual changes or discharge    HENT: Denies sore throat, ear pain or neck pain  Respiratory: Denies cough or shortness of breath    Cardiovascular: Denies chest pain, palpitations or leg swelling  GI: Denies abdominal pain, nausea, vomiting, or dark, bloody stools.    : Denies hematuria, dysuria, or flank pain  Musculoskeletal: Positive for right hip pain. Denies any new back pain   Skin: Denies rash or wound  Neurologic: Denies current headache, new weakness, focal weakness, or sensory changes    Lymphatic: Denies swollen glands    Psychiatric: Denies depression, suicidal ideation or homicidal ideation.    Remainder of systems reviewed, unless noted in HPI all others negative.      PAST MEDICAL HISTORY:  Past Medical History:   Diagnosis Date     Tachy-wesly syndrome (H)        PAST SURGICAL HISTORY:  Past Surgical History:   Procedure Laterality Date     CV TEMPORARY PACEMAKER INSERTION           CURRENT MEDICATIONS:    aspirin 81 MG EC tablet  atorvastatin (LIPITOR) 40 MG tablet  azaTHIOprine (IMURAN) 50 MG tablet  calcium polycarbophil (FIBERCON) 625 MG tablet  finasteride (PROSCAR) 5 MG tablet  lisinopril (ZESTRIL) 20 MG tablet  metoprolol succinate ER (TOPROL-XL) 25 MG 24 hr tablet  multivitamin w/minerals (THERA-VIT-M) tablet  ondansetron (ZOFRAN ODT) 4 MG ODT tab  terazosin (HYTRIN) 1 MG capsule  warfarin ANTICOAGULANT (COUMADIN) 5 MG tablet  acetaminophen (TYLENOL) 325 MG tablet        ALLERGIES:  Allergies   Allergen Reactions     Clopidogrel Rash     Celecoxib Rash     Sulfa Drugs Rash       FAMILY HISTORY:  Family History   Problem Relation Age of Onset     Diabetes Brother      Coronary Artery Disease Brother        SOCIAL HISTORY:   Social History     Socioeconomic  History     Marital status:        PHYSICAL EXAM:    BP (!) 177/97   Pulse 87   Temp 98  F (36.7  C) (Oral)   Resp 18   SpO2 98%   General Presentation: No apparent distress.  Head: Atraumatic  ENT: Ears atraumatic. Ear canals clear. No blood or CSF in canals. No hemotympanum or tympanic membrane rupture. Nose atraumatic/non-tender. No septal hematoma. Face/mandible non-tender. Oropharynx clear.  Neck: No obvious deformity or swelling.  No midline cervical spine tenderness.  No stridor or swelling.  Eye: Pupils equal round and reactive to light. EOMFI. No conjunctival hemorrhage. No hyphema. Eye lids normal.  Pulmonary: Spontaneous respiration. No respiratory distress. Clear equal breath sounds. Airway patent. Speech is normal. No stridor. Grossly stable chest wall. No crepitus. No chest wall tenderness to palpation noted.  Circulatory: Regular rate and rhythm. No murmurs, rubs, or gallops. Normal capillary refill.   Chest wall: No gross abnormality, no tenderness deformity or swelling  Abdominal: Abdomen soft, non-tender and non-distended. No peritoneal signs. No flank tenderness. Normal bowel sounds. Pelvis stable/non-tender.   Neurologic: Alert and awake. Cranial nerves II-XII grossly intact.  No gross motor deficit. No gross sensory deficit. Normal upper extremity and lower extremity strength. [place] Coma Score 15.  Spine: No bony tenderness to palpation in the cervical spine, thoracic spine, lumbar spine, or sacrum.     Upper extremities:  Full range of motion. No tenderness to palpation.  Pulses 2/4 bilateral upper extremities . No wounds, abrasions, lacerations, or contusions noted.   Lower extremities: Left lower extremity painful range of motion and compression otherwise other joints have full range of motion. No tenderness to palpation.  Pulses 2/4 bilateral lower extremities . No wounds, abrasions, lacerations, or contusions noted.   Skin: Head/scalp non tender. No wounds, abrasions,  lacerations, or contusions of head/scalp, chest, back, abdomen, flank or pelvis.        ED COURSE:  4:57 PM I met the patient and performed my initial interview and exam.   6:30 PM I spoke with Dr. Vang, hospitalist.     LAB:  All pertinent labs reviewed and interpreted.  Results for orders placed or performed during the hospital encounter of 11/10/22   CT Pelvis Bone wo Contrast    Impression    IMPRESSION:  1.  Both hips are negative for fracture or CT evidence of avascular necrosis.  2.  There is a nondisplaced fracture of the right pubic body which extends a short distance into the right inferior pubic ramus. No disruption of the symphysis pubis.  3.  No additional fractures are identified.  4.  There is some soft tissue stranding about the fracture, but no evidence for organized hematoma.  5.  There is some additional edema lateral to the right hip which could represent direct impact injury/contusion, but again no evidence for hematoma.  6.  Advanced degenerative change at the SI joints bilaterally with near ankylosis on the right.     Comprehensive metabolic panel   Result Value Ref Range    Sodium 144 136 - 145 mmol/L    Potassium 3.8 3.5 - 5.0 mmol/L    Chloride 107 98 - 107 mmol/L    Carbon Dioxide (CO2) 28 22 - 31 mmol/L    Anion Gap 9 5 - 18 mmol/L    Urea Nitrogen 18 8 - 28 mg/dL    Creatinine 0.83 0.70 - 1.30 mg/dL    Calcium 9.8 8.5 - 10.5 mg/dL    Glucose 98 70 - 125 mg/dL    Alkaline Phosphatase 47 45 - 120 U/L    AST 20 0 - 40 U/L    ALT 13 0 - 45 U/L    Protein Total 6.3 6.0 - 8.0 g/dL    Albumin 3.7 3.5 - 5.0 g/dL    Bilirubin Total 0.8 0.0 - 1.0 mg/dL    GFR Estimate 90 >60 mL/min/1.73m2   Result Value Ref Range    INR 2.55 (H) 0.85 - 1.15   Symptomatic; Unknown Influenza A/B & SARS-CoV2 (COVID-19) Virus PCR Multiplex Nasopharyngeal    Specimen: Nasopharyngeal; Swab   Result Value Ref Range    Influenza A PCR Negative Negative    Influenza B PCR Negative Negative    RSV PCR Negative Negative     SARS CoV2 PCR Negative Negative   CBC with platelets and differential   Result Value Ref Range    WBC Count 6.6 4.0 - 11.0 10e3/uL    RBC Count 4.86 4.40 - 5.90 10e6/uL    Hemoglobin 15.4 13.3 - 17.7 g/dL    Hematocrit 45.6 40.0 - 53.0 %    MCV 94 78 - 100 fL    MCH 31.7 26.5 - 33.0 pg    MCHC 33.8 31.5 - 36.5 g/dL    RDW 13.0 10.0 - 15.0 %    Platelet Count 98 (L) 150 - 450 10e3/uL    % Neutrophils 68 %    % Lymphocytes 20 %    % Monocytes 10 %    % Eosinophils 1 %    % Basophils 0 %    % Immature Granulocytes 1 %    NRBCs per 100 WBC 0 <1 /100    Absolute Neutrophils 4.6 1.6 - 8.3 10e3/uL    Absolute Lymphocytes 1.3 0.8 - 5.3 10e3/uL    Absolute Monocytes 0.7 0.0 - 1.3 10e3/uL    Absolute Eosinophils 0.0 0.0 - 0.7 10e3/uL    Absolute Basophils 0.0 0.0 - 0.2 10e3/uL    Absolute Immature Granulocytes 0.0 <=0.4 10e3/uL    Absolute NRBCs 0.0 10e3/uL       RADIOLOGY:  Reviewed all pertinent imaging. Please see official radiology report.  CT Pelvis Bone wo Contrast   Final Result   IMPRESSION:   1.  Both hips are negative for fracture or CT evidence of avascular necrosis.   2.  There is a nondisplaced fracture of the right pubic body which extends a short distance into the right inferior pubic ramus. No disruption of the symphysis pubis.   3.  No additional fractures are identified.   4.  There is some soft tissue stranding about the fracture, but no evidence for organized hematoma.   5.  There is some additional edema lateral to the right hip which could represent direct impact injury/contusion, but again no evidence for hematoma.   6.  Advanced degenerative change at the SI joints bilaterally with near ankylosis on the right.                MEDICATIONS GIVEN IN THE EMERGENCY:  Medications   atorvastatin (LIPITOR) tablet 40 mg (has no administration in time range)   lidocaine 1 % 0.1-1 mL (has no administration in time range)   lidocaine (LMX4) cream (has no administration in time range)   sodium chloride (PF) 0.9% PF  flush 3 mL (3 mLs Intracatheter Given 11/10/22 2107)   sodium chloride (PF) 0.9% PF flush 3 mL (has no administration in time range)   melatonin tablet 1 mg (has no administration in time range)   ondansetron (ZOFRAN ODT) ODT tab 4 mg (has no administration in time range)     Or   ondansetron (ZOFRAN) injection 4 mg (has no administration in time range)   HYDROmorphone (DILAUDID) injection 0.2 mg (has no administration in time range)   metoprolol succinate ER (TOPROL-XL) 24 hr half-tab 12.5 mg (has no administration in time range)   lisinopril (ZESTRIL) tablet 20 mg (has no administration in time range)   ondansetron (ZOFRAN ODT) ODT tab 4 mg (has no administration in time range)   terazosin (HYTRIN) capsule 1 mg (has no administration in time range)   Warfarin Dose Required Daily - Pharmacist Managed (has no administration in time range)   azaTHIOprine (IMURAN) tablet 50 mg (has no administration in time range)   warfarin-No DOSE today (has no administration in time range)   oxyCODONE (ROXICODONE) tablet 5 mg (5 mg Oral Given 11/10/22 1659)           NEW PRESCRIPTIONS STARTED AT TODAY'S ER VISIT:  New Prescriptions    No medications on file          FINAL DIAGNOSIS:    ICD-10-CM    1. Closed nondisplaced fracture of pelvis, unspecified part of pelvis, initial encounter (H)  S32.9XXA                At the conclusion of the encounter I discussed the results of all of the tests and the disposition. The questions were answered. The patient or family acknowledged understanding and was agreeable with the care plan.     NAME: Carlos Ch  AGE: 77 year old male  YOB: 1945  MRN: [id number]  EVALUATION DATE & TIME: 11/10/2022  4:25 PM    PCP: [facility] [place]    ED PROVIDER: TAYA Cornejo Caleb Rollag, am serving as a scribe to document services personally performed by Dr. Jace Alvarado based on my observation and the provider's statements to me. I, Jace Alvarado MD attest that Dustin Vernon  is acting in a scribe capacity, has observed my performance of the services and has documented them in accordance with my direction.    Jace Alvarado M.D.  Emergency Medicine  [facility] EMERGENCY ROOM  [address] [facility] [address]  [phone]  Dept: [phone]  11/10/2022       Jace Alvarado MD  11/10/22 2693

## 2022-11-11 ENCOUNTER — APPOINTMENT (OUTPATIENT)
Dept: PHYSICAL THERAPY | Facility: CLINIC | Age: 77
End: 2022-11-11
Attending: STUDENT IN AN ORGANIZED HEALTH CARE EDUCATION/TRAINING PROGRAM
Payer: MEDICARE

## 2022-11-11 ENCOUNTER — APPOINTMENT (OUTPATIENT)
Dept: OCCUPATIONAL THERAPY | Facility: CLINIC | Age: 77
End: 2022-11-11
Attending: STUDENT IN AN ORGANIZED HEALTH CARE EDUCATION/TRAINING PROGRAM
Payer: MEDICARE

## 2022-11-11 LAB
ANION GAP SERPL CALCULATED.3IONS-SCNC: 10 MMOL/L (ref 5–18)
BUN SERPL-MCNC: 16 MG/DL (ref 8–28)
CALCIUM SERPL-MCNC: 9.5 MG/DL (ref 8.5–10.5)
CHLORIDE BLD-SCNC: 107 MMOL/L (ref 98–107)
CO2 SERPL-SCNC: 27 MMOL/L (ref 22–31)
CREAT SERPL-MCNC: 0.81 MG/DL (ref 0.7–1.3)
ERYTHROCYTE [DISTWIDTH] IN BLOOD BY AUTOMATED COUNT: 13 % (ref 10–15)
GFR SERPL CREATININE-BSD FRML MDRD: >90 ML/MIN/1.73M2
GLUCOSE BLD-MCNC: 100 MG/DL (ref 70–125)
HCT VFR BLD AUTO: 45.2 % (ref 40–53)
HGB BLD-MCNC: 15.1 G/DL (ref 13.3–17.7)
INR PPP: 2.71 (ref 0.85–1.15)
MCH RBC QN AUTO: 31.4 PG (ref 26.5–33)
MCHC RBC AUTO-ENTMCNC: 33.4 G/DL (ref 31.5–36.5)
MCV RBC AUTO: 94 FL (ref 78–100)
PLATELET # BLD AUTO: 99 10E3/UL (ref 150–450)
POTASSIUM BLD-SCNC: 3.8 MMOL/L (ref 3.5–5)
RBC # BLD AUTO: 4.81 10E6/UL (ref 4.4–5.9)
SODIUM SERPL-SCNC: 144 MMOL/L (ref 136–145)
WBC # BLD AUTO: 6.5 10E3/UL (ref 4–11)

## 2022-11-11 PROCEDURE — 85027 COMPLETE CBC AUTOMATED: CPT | Performed by: STUDENT IN AN ORGANIZED HEALTH CARE EDUCATION/TRAINING PROGRAM

## 2022-11-11 PROCEDURE — G0378 HOSPITAL OBSERVATION PER HR: HCPCS

## 2022-11-11 PROCEDURE — 250N000012 HC RX MED GY IP 250 OP 636 PS 637: Performed by: STUDENT IN AN ORGANIZED HEALTH CARE EDUCATION/TRAINING PROGRAM

## 2022-11-11 PROCEDURE — 250N000011 HC RX IP 250 OP 636: Performed by: STUDENT IN AN ORGANIZED HEALTH CARE EDUCATION/TRAINING PROGRAM

## 2022-11-11 PROCEDURE — 97535 SELF CARE MNGMENT TRAINING: CPT | Mod: GO

## 2022-11-11 PROCEDURE — 96376 TX/PRO/DX INJ SAME DRUG ADON: CPT

## 2022-11-11 PROCEDURE — 85610 PROTHROMBIN TIME: CPT | Performed by: STUDENT IN AN ORGANIZED HEALTH CARE EDUCATION/TRAINING PROGRAM

## 2022-11-11 PROCEDURE — 250N000013 HC RX MED GY IP 250 OP 250 PS 637: Performed by: STUDENT IN AN ORGANIZED HEALTH CARE EDUCATION/TRAINING PROGRAM

## 2022-11-11 PROCEDURE — 97116 GAIT TRAINING THERAPY: CPT | Mod: GP

## 2022-11-11 PROCEDURE — 99226 PR SUBSEQUENT OBSERVATION CARE,LEVEL III: CPT | Performed by: STUDENT IN AN ORGANIZED HEALTH CARE EDUCATION/TRAINING PROGRAM

## 2022-11-11 PROCEDURE — 97162 PT EVAL MOD COMPLEX 30 MIN: CPT | Mod: GP

## 2022-11-11 PROCEDURE — 97166 OT EVAL MOD COMPLEX 45 MIN: CPT | Mod: GO

## 2022-11-11 PROCEDURE — 36415 COLL VENOUS BLD VENIPUNCTURE: CPT | Performed by: STUDENT IN AN ORGANIZED HEALTH CARE EDUCATION/TRAINING PROGRAM

## 2022-11-11 PROCEDURE — 96374 THER/PROPH/DIAG INJ IV PUSH: CPT

## 2022-11-11 PROCEDURE — 82310 ASSAY OF CALCIUM: CPT | Performed by: STUDENT IN AN ORGANIZED HEALTH CARE EDUCATION/TRAINING PROGRAM

## 2022-11-11 RX ORDER — WARFARIN SODIUM 7.5 MG/1
7.5 TABLET ORAL
Status: COMPLETED | OUTPATIENT
Start: 2022-11-11 | End: 2022-11-11

## 2022-11-11 RX ADMIN — HYDROMORPHONE HYDROCHLORIDE 0.2 MG: 0.2 INJECTION, SOLUTION INTRAMUSCULAR; INTRAVENOUS; SUBCUTANEOUS at 12:36

## 2022-11-11 RX ADMIN — HYDROMORPHONE HYDROCHLORIDE 0.2 MG: 0.2 INJECTION, SOLUTION INTRAMUSCULAR; INTRAVENOUS; SUBCUTANEOUS at 03:13

## 2022-11-11 RX ADMIN — AZATHIOPRINE 50 MG: 50 TABLET ORAL at 09:40

## 2022-11-11 RX ADMIN — LISINOPRIL 20 MG: 20 TABLET ORAL at 08:33

## 2022-11-11 RX ADMIN — ATORVASTATIN CALCIUM 40 MG: 40 TABLET, FILM COATED ORAL at 20:54

## 2022-11-11 RX ADMIN — METOPROLOL SUCCINATE 12.5 MG: 25 TABLET, EXTENDED RELEASE ORAL at 08:33

## 2022-11-11 RX ADMIN — HYDROMORPHONE HYDROCHLORIDE 0.2 MG: 0.2 INJECTION, SOLUTION INTRAMUSCULAR; INTRAVENOUS; SUBCUTANEOUS at 06:07

## 2022-11-11 RX ADMIN — WARFARIN SODIUM 7.5 MG: 7.5 TABLET ORAL at 17:39

## 2022-11-11 RX ADMIN — TERAZOSIN HYDROCHLORIDE 1 MG: 1 CAPSULE ORAL at 20:54

## 2022-11-11 ASSESSMENT — ACTIVITIES OF DAILY LIVING (ADL)
ADLS_ACUITY_SCORE: 43
ADLS_ACUITY_SCORE: 38
ADLS_ACUITY_SCORE: 37
ADLS_ACUITY_SCORE: 38
ADLS_ACUITY_SCORE: 41
DEPENDENT_IADLS:: CLEANING;LAUNDRY;SHOPPING;TRANSPORTATION
ADLS_ACUITY_SCORE: 41
ADLS_ACUITY_SCORE: 37
ADLS_ACUITY_SCORE: 37
ADLS_ACUITY_SCORE: 42
ADLS_ACUITY_SCORE: 38
ADLS_ACUITY_SCORE: 41
ADLS_ACUITY_SCORE: 43

## 2022-11-11 NOTE — PHARMACY-ADMISSION MEDICATION HISTORY
Pharmacy Note - Admission Medication History    Pertinent Provider Information: was started on sinemet, but stopped due to vertigo.  reglan was changed to zofran;   flomax was changed to terazosin     ______________________________________________________________________    Prior To Admission (PTA) med list completed and updated in EMR.       PTA Med List   Medication Sig Last Dose     aspirin 81 MG EC tablet Take 81 mg by mouth At Bedtime 11/9/2022     atorvastatin (LIPITOR) 40 MG tablet Take 40 mg by mouth At Bedtime 11/9/2022     azaTHIOprine (IMURAN) 50 MG tablet Take 50 mg by mouth daily 11/9/2022     calcium polycarbophil (FIBERCON) 625 MG tablet Take 2 tablets by mouth daily 11/9/2022     finasteride (PROSCAR) 5 MG tablet Take 5 mg by mouth At Bedtime 11/10/2022     lisinopril (ZESTRIL) 20 MG tablet Take 20 mg by mouth daily 11/10/2022     metoprolol succinate ER (TOPROL-XL) 25 MG 24 hr tablet Take 12.5 mg by mouth daily 11/10/2022     multivitamin w/minerals (THERA-VIT-M) tablet Take 1 tablet by mouth daily 11/10/2022     ondansetron (ZOFRAN ODT) 4 MG ODT tab Take 4 mg by mouth every 8 hours as needed for nausea PRN     terazosin (HYTRIN) 1 MG capsule Take 1 mg by mouth At Bedtime 11/10/2022     warfarin ANTICOAGULANT (COUMADIN) 5 MG tablet Take 5-7.5 mg by mouth See Admin Instructions 5 mg on Mondays and Thursdays; 7.5 mg all other days 11/10/2022       Information source(s): Patient and CareEverywhere/SureScripts  Method of interview communication: in-person    Summary of Changes to PTA Med List  New: zofran, terazosin  Discontinued: reglan, flomax, sinemet  Changed: warfarin dosing     Patient was asked about OTC/herbal products specifically.  PTA med list reflects this.    In the past week, patient estimated taking medication this percent of the time:  greater than 90%.    Allergies were reviewed, assessed, and updated with the patient.      Patient does not use any multi-dose medications prior to  admission.    The information provided in this note is only as accurate as the sources available at the time of the update(s).    Thank you for the opportunity to participate in the care of this patient.    Leidy Boyle [facility]  11/10/2022 8:24 PM

## 2022-11-11 NOTE — PHARMACY-ANTICOAGULATION SERVICE
Clinical Pharmacy - Warfarin Dosing Consult     Pharmacy has been consulted to manage this patient s warfarin therapy.  Indication: Atrial Fibrillation  Therapy Goal: INR 2-3  Provider/Team: SAF[facility]  Warfarin Prior to Admission: Yes  Warfarin PTA Regimen: 5 mg on Mondays, Thursdays;  7.5 mg all other days  Dose Comments: pt took dose prior to coming to ED today, so no dose in ED    INR   Date Value Ref Range Status   11/10/2022 2.55 (H) 0.85 - 1.15 Final   03/29/2022 2.62 (H) 0.85 - 1.15 Final       Recommend warfarin 0 mg today.  Pharmacy will monitor Carlos Ch daily and order warfarin doses to achieve specified goal.      Please contact pharmacy as soon as possible if the warfarin needs to be held for a procedure or if the warfarin goals change.

## 2022-11-11 NOTE — ED NOTES
Pt does not want lunch. Not hungry. Denies pain. Daughter is here. Pt and daughter do not want him to go home today. Discussing TCU. Will update Care manager.

## 2022-11-11 NOTE — PROGRESS NOTES
11/11/22 1421   Appointment Info   Signing Clinician's Name / Credentials (OT) Andree Shine, MOTR/L, CLT   Quick Adds   Quick Adds Certification   Living Environment   People in Home spouse   Current Living Arrangements   ([facility])   Transportation Anticipated family or friend will provide   Self-Care   Usual Activity Tolerance fair   Current Activity Tolerance fair   Regular Exercise No   Activity/Exercise/Self-Care Comment Pt has been deteriorating per spouse for past few weeks prior to fall. 3 falls within past month   General Information   Onset of Illness/Injury or Date of Surgery 11/10/22   Referring Physician Dr. Bebo Vang   Patient/Family Therapy Goal Statement (OT) pt wants home, but spouse is fearful of bringing pt home   Additional Occupational Profile Info/Pertinent History of Current Problem mod   Existing Precautions/Restrictions weight bearing   Right Lower Extremity (Weight-bearing Status) weight-bearing as tolerated (WBAT)   Cognitive Status Examination   Orientation Status person;place   Affect/Mental Status (Cognitive) confused;other (see comments)  (slow processing)   Follows Commands 50-74% accuracy   Safety Deficit insight into deficits/self-awareness   Memory Deficit short-term memory   Visual Perception   Visual Impairment/Limitations WFL   Sensory   Sensory Quick Adds sensation intact   Pain Assessment   Patient Currently in Pain   (more pain with walking)   Posture   Posture not impaired   Range of Motion Comprehensive   General Range of Motion no range of motion deficits identified   Strength Comprehensive (MMT)   General Manual Muscle Testing (MMT) Assessment   (generalized weakness)   Muscle Tone Assessment   Muscle Tone Quick Adds No deficits were identified   Coordination   Upper Extremity Coordination No deficits were identified   Bed Mobility   Comment (Bed Mobility) max A   Transfers   Transfer Comments mod A   Balance   Balance Comments decreased   Activities of Daily  Living   BADL Assessment/Intervention lower body dressing;toileting   Lower Body Dressing Assessment/Training   [place] Level (Lower Body Dressing) moderate assist (50% patient effort)   Toileting   [place] Level (Toileting) minimum assist (75% patient effort)   Clinical Impression   Criteria for Skilled Therapeutic Interventions Met (OT) Yes, treatment indicated   OT Diagnosis decr ADL indep/safety   Influenced by the following impairments fx of pelvis   OT Problem List-Impairments impacting ADL activity tolerance impaired;balance;cognition;flexibility;mobility;strength;pain   Assessment of Occupational Performance 3-5 Performance Deficits   Identified Performance Deficits dressing, home mgmt, func mob/bed mob, bathing   Planned Therapy Interventions (OT) ADL retraining;balance training;bed mobility training;cognition;transfer training;strengthening;progressive activity/exercise   Clinical Decision Making Complexity (OT) moderate complexity   Anticipated Equipment Needs Upon Discharge (OT) other (see comments)  (possible bedside commode)   Risk & Benefits of therapy have been explained care plan/treatment goals reviewed;patient;spouse/significant other   OT Total Evaluation Time   OT Eval, Moderate Complexity Minutes (10598) 15   Therapy Certification   Medical Diagnosis closed fracture of pelvis   Start of Care Date 11/11/22   Certification date from 11/11/22   Certification date to 12/11/22   OT Goals   Therapy Frequency (OT) Daily   OT Predicted Duration/Target Date for Goal Attainment 11/17/22   OT Goals Lower Body Dressing;Toilet Transfer/Toileting;Cognition   OT: Lower Body Dressing Modified independent;using adaptive equipment   OT: Toilet Transfer/Toileting Modified independent;toilet transfer   OT: Cognitive Patient/caregiver will verbalize understanding of cognitive assessment results/recommendations as needed for safe discharge planning  (with min v/c)   Interventions   Interventions Quick Adds  Self-Care/Home Management   Self-Care/Home Management   Self-Care/Home Mgmt/ADL, Compensatory, Meal Prep Minutes (48351) 15   Symptoms Noted During/After Treatment (Meal Preparation/Planning Training) none   Treatment Detail/Skilled Intervention Supine to sit with mod Ax2. All functional transfers with min A/FWW/cues for tech/hand placement including bed. Walked to bathroom with min A/FWW/cues for posture/safety with FWW.  Stood for toileting with CGA/FWW/rail/cues for tech/hand placement. Walked to sink and performed g/h tasks with CGA/FWW/cues for posture. Pt stood at sink with sink for support and completed g/h tasks including wash hands. Walked back to the bed with CGA/FWW/cues for posture/safety. Needed mod A for sit to supine and cues for positioning in bed.  Increased time/effort needed for all tasks d/t increased pain/weakness. Educ in safe tech for functional transfers and ADL. Role of therapy explained. All questions answered.   OT Discharge Planning   OT Plan [id number];any transfrs/bed mob/LE dress   OT Discharge Recommendation (DC Rec) (S)  Transitional Care Facility;home with assist;home with home care occupational therapy   OT Rationale for DC Rec Pt has decreased balance/increased weakness and if pt were to go home, would need max-mod Ax2 for bed mobility, bedside commode, tub chair, grab bars in tub/near toilet.   OT Brief overview of current status Pt mod Ax2 for all bed mobility and min A for func mob/walking with FWW.   Total Session Time   Timed Code Treatment Minutes 15   Total Session Time (sum of timed and untimed services) 30    [facility]  OUTPATIENT OCCUPATIONAL THERAPY  EVALUATION  PLAN OF TREATMENT FOR OUTPATIENT REHABILITATION  (COMPLETE FOR INITIAL CLAIMS ONLY)  Patient's Last Name, First Name, M.I.  YOB: 1945  Carlos Ch                          Provider's Name  [facility] Medical Record No.  [id number]                              Onset Date:  11/10/22   Start of Care Date:  11/11/22   Type:     ___PT   _X_OT   ___SLP Medical Diagnosis:  closed fracture of pelvis                    OT Diagnosis:  decr ADL indep/safety Visits from SOC:  1     See note for plan of treatment, functional goals and certification details    I CERTIFY THE NEED FOR THESE SERVICES FURNISHED UNDER        THIS PLAN OF TREATMENT AND WHILE UNDER MY CARE     (Physician co-signature of this document indicates review and certification of the therapy plan).

## 2022-11-11 NOTE — PROGRESS NOTES
[facility]    Medicine Progress Note - Hospitalist Service    Date of Admission:  11/10/2022    Assessment & Plan      Carlos Ch is a 77 year old male admitted on 11/10/2022. 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  Orthopedics consulted.  Has a history of orthostatic hypotension and tachybradycardia syndrome.    HD1 - no plans for surgery; conservative cares. Pt and family on board with therapies and further rehab. PT and OT consulted. SW consulted.     Principal Problem:    Closed nondisplaced fracture of pelvis, unspecified part of pelvis, initial encounter (H)    Parkinsonism    Assessment: He has a history of vertigo and parkinsonism, previously was on Sinemet but stopped secondary to his unsteadiness.  He has also had decreased p.o. intake.  Unclear but suspect multifactorial etiologies contributed to his fall.  Now has a fracture of the right pubic body extending into the right inferior pubic ramus; consulted orthopedics.  Discussed with the patient pain control as well as having orthopedics evaluate.  He is notably DNR but has not decided if he would temporarily suspend the code status if there is intervention offered tomorrow.  Another potential etiology is his history of tachybradycardia syndrome which could have led to a near syncopal-like episode.  He was hospitalized in 3/2022 for this. As in summary above- no surgery. Placement and conservative cares.    Plan:   - consult to orthopedics, appreciate   -Ambulate, supportive and symptomatic treatment, start to resume weightbearing as tolerated with a walker, pain control.   -Patient will need to see orthopedics in 10 to 14 days for repeat x-ray and follow-up.   - contact [facility] at [phone] to schedule an  appointment with any arthritis service line physician.  - will put this in discharge directions   - pain control - low dose dilaudid q3 hrs PRN   - may enlist Pain Team if refractory pain  - check orthostatics - reordered 11/11/2022        Anticoagulation monitoring, INR range 2-3    PAF (paroxysmal atrial fibrillation) (H)    Assessment: stable. On warfarin and metoprolol.     Plan:   - pharmacy to dose warfarin  - continue beta blocker      Arthritis    Assessment/plan: pain control as in first issue above       Autoimmune hepatitis (H)    Chronic ITP (idiopathic thrombocytopenic purpura) (H)    Assessment: on azathioprine for both of these.    Plan:   -continue   -follow cbc      Benign essential hypertension    Assessment: on metoprolol and terazosin    Plan: -continue      BPH with obstruction/lower urinary tract symptoms    Assessment: on terazosin.    Plan: -continue       Coronary atherosclerosis    Pacemaker    Tachy-wesly syndrome (H)    Assessment: on baby asa. No chest pain.  As above, tachybradycardia syndrome could also be contributing to his near syncopal episode. Has a pacemaker.     Plan:   - follow clinically, ekg and troponin if chest pain            Diet: Low Saturated Fat Na <2400 mg    DVT Prophylaxis: Pneumatic Compression Devices  Romero Catheter: Not present  Central Lines: None  Cardiac Monitoring: None  Code Status: No CPR- Do NOT Intubate      Disposition Plan      Expected Discharge Date: 11/12/2022      Destination: home with family;home with help/services          The patient's care was discussed with the Patient.    Bebo Vang MD  Hospitalist Service  [facility]  Securely message with the Vocera Web Console (learn more here)  Text page via [employer] Paging/Directory         Clinically Significant Risk Factors Present on Admission                # Drug Induced Coagulation Defect: home medication list includes an anticoagulant medication  # Thrombocytopenia:  Lowest platelets = 98 (Ref range: 150-450) in last 2 days, will monitor for bleeding   # Hypertension: home medication list includes antihypertensive(s)     # Obesity: Estimated body mass index is 30.52 kg/m  as calculated from the following:    Height as of this encounter: 1.829 m (6').    Weight as of this encounter: 102.1 kg (225 lb).           ______________________________________________________________________    Interval History   - no acute interval events  - pt was understandably unhappy about  Not having a physical room until 2:30am  - this morning his pain is exquisite with movement  - ortho not planning surgery; we discussed PT and OT and disposition    Data reviewed today: I reviewed all medications, new labs and imaging results over the last 24 hours. I personally reviewed no images or EKG's today.    Physical Exam   Vital Signs: Temp: 98.4  F (36.9  C) Temp src: Oral BP: (!) 172/96 Pulse: 89   Resp: 18 SpO2: 95 % O2 Device: None (Room air)    Weight: 225 lbs 0 oz    General: alert, oriented, and in no acute distress  Pulmonary: clear to auscultation bilaterally, normal respiratory effort, on room air, no rales or wheezes or evidence of accessory muscle use  CVS:irregularly irregular, no murmurs, rubs, or gallops; no blatant jugular venous distention; no extremity edema and extremities are warm to the touch  GI: soft, nontender, BS+, no rebound or guarding, no conspicuous organomegaly   Neuro: nonfocal, moves all extremities of own volition  MSK: upper extremities wnl; lower extremities bilaterally reduced ROM 2/2 to pain; no tenderness on palpation of lower extremities   Psych: appropriate          Data   Recent Labs   Lab 11/11/22  0554 11/10/22  2104   WBC 6.5 6.6   HGB 15.1 15.4   MCV 94 94   PLT 99* 98*   INR 2.71* 2.55*    144   POTASSIUM 3.8 3.8   CHLORIDE 107 107   CO2 27 28   BUN 16 18   CR 0.81 0.83   ANIONGAP 10 9   LISANDRO 9.5 9.8    98   ALBUMIN  --  3.7   PROTTOTAL  --  6.3    BILITOTAL  --  0.8   ALKPHOS  --  47   ALT  --  13   AST  --  20     Recent Results (from the past 24 hour(s))   CT Pelvis Bone wo Contrast    Narrative    EXAM: CT PELVIS BONE WO CONTRAST  LOCATION: [facility]  DATE/TIME: 11/10/2022 5:14 PM    INDICATION: Hip pain without trauma or other complicating feature; no hip x-ray result available.    COMPARISON: X-ray evaluation 11/8/2022.    TECHNIQUE: CT scan of the pelvis was performed without IV contrast. Multiplanar reformats were obtained. Dose reduction techniques were used.    CONTRAST: None.    FINDINGS: Both hips are negative for fracture or CT evidence of avascular necrosis. There is degenerative change at both hips with superior joint space narrowing and slight osteophytic spurring. Findings are fairly symmetric.    There is a fracture of the right pubic body. This extends a short distance into the right inferior pubic ramus. No disruption of the symphysis pubis. No additional fractures are identified. 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 Degenerative change at   both SI joints.    There is soft tissue stranding surrounding the right pubic body fracture, but no significant organized hematoma. The intrapelvic contents are otherwise negative.       Impression    IMPRESSION:  1.  Both hips are negative for fracture or CT evidence of avascular necrosis.  2.  There is a nondisplaced fracture of the right pubic body which extends a short distance into the right inferior pubic ramus. No disruption of the symphysis pubis.  3.  No additional fractures are identified.  4.  There is some soft tissue stranding about the fracture, but no evidence for organized hematoma.  5.  There is some additional edema lateral to the right hip which could represent direct impact injury/contusion, but again no evidence for hematoma.  6.  Advanced degenerative change at the SI joints bilaterally with near ankylosis on the right.       Medications        atorvastatin  40 mg Oral At Bedtime     azaTHIOprine  50 mg Oral Daily     lisinopril  20 mg Oral Daily     metoprolol succinate ER  12.5 mg Oral Daily     sodium chloride (PF)  3 mL Intracatheter Q8H     terazosin  1 mg Oral At Bedtime     warfarin ANTICOAGULANT  7.5 mg Oral ONCE at 18:00     Warfarin Therapy Reminder  1 each Oral See Admin Instructions

## 2022-11-11 NOTE — ED NOTES
This writer spoke with Jermaine from Radiology who said pt did not want the shoulder xray as he had one done a couple days ago in another setting and it isn't really bothering him right now. PT. Updated as they were wondering about the xray.

## 2022-11-11 NOTE — CONSULTS
Care Management Follow Up    Length of Stay (days): 1    Expected Discharge Date: 11/12/2022     Concerns to be Addressed: discharge planning, home safety     Patient plan of care discussed at interdisciplinary rounds:    Anticipated Discharge Disposition: Home, Home Care, Skilled Nursing Facility     Anticipated Discharge Services: TCU  Anticipated Discharge DME: Other (see comment) (Pending clinical progress)    Patient/family educated on Medicare website which has current facility and service quality ratings:      Education Provided on the Discharge Plan:  Yes  Patient/Family in Agreement with the Plan: yes    Referrals Placed by CM/SW:  Accepted by Kim at [facility].    Private pay costs discussed: Yes.     Informed patient and wife that medical transport is not covered by Medicare.  [facility] approximate rates: Wheelchair transport Base rate $82.00 and $5.50 per ground mile.  BLS stretcher Non-Emergency approximately Base rate $1200 and ground mileage of approximately $26.10 per mile.      Additional Information:  Patient and wife Blaire are pleased and will accept going to [facility] TCU on 11/12/22. They understand that coverage is still pending PT/OT TCU recommendation and that if not recommended family is still considering TCU though it may be private pay.      LUCIO DILLON RN/CM

## 2022-11-11 NOTE — UTILIZATION REVIEW
"Admission Status; Secondary Review Determination     Under the authority of the Utilization Management Committee, the utilization review process indicated a secondary review on Carlos Ch.  The review outcome is based on review of the medical records, discussions with staff, and applying clinical experience noted on the date of the review.     (x) Observation Status Appropriate - This patient does not meet hospital inpatient criteria and is placed in observation status. If this patient's primary payer is Medicare and was admitted as an inpatient, Condition Code 44 should be used and patient status changed to \"observation\".     RATIONALE FOR DETERMINATION   77 year old male with a history of neuropathy, hypertension, ITP, and paroxysmal atrial fibrillation who presents to the ED by EMS for evaluation of a fall. Admitted with Closed nondisplaced fracture of pelvis, ortho consult , no surgery , pain is better on oral pain medications , pending PT/OT     The severity of illness, intensity of service provided, expected LOS and risk for adverse outcome make the care appropriate for further observation; however, doesn't meet criteria for hospital inpatient admission. Dr Vang is notified of this determination and agrees with downgrade of status.      The information on this document is developed by the utilization review team in order for the business office to ensure compliance.  This only denotes the appropriateness of proper admission status and does not reflect the quality of care rendered.         The definitions of Inpatient Status and Observation Status used in making the determination above are those provided in the CMS Coverage Manual, Chapter 1 and Chapter 6, section 70.4.      Sincerely,  Jaxon Mary MD  Utilization Review  Physician Advisor  [facility]  "

## 2022-11-11 NOTE — ED NOTES
XR SHOULDER 3 VIEWS RIGHT    Anatomical Region Laterality Modality   SHOULDERS -- Digital Radiography   SHOULDER R -- --     Impression    No fractures are identified. Normal glenohumeral alignment. Mild degenerative changes in the acromial intramuscular joint. Cardiac device.  Narrative    For Patients: As a result of the 21st Century Cures Act, medical imaging exams and procedure reports are released immediately into your electronic medical record. You may view this report before your referring provider. If you have questions, please contact your health care provider.     EXAM: XR SHOULDER 3 VIEWS RIGHT   LOCATION: [facility]   DATE/TIME: 11/8/2022 5:52 PM     INDICATION: Acute Pain Of Right Shoulder   COMPARISON: None.  Procedure Note    Azam Abel MD - 11/08/2022   Formatting of this note might be different from the original.   For Patients: As a result of the 21st Century Cures Act, medical imaging exams and procedure reports are released immediately into your electronic medical record. You may view this report before your referring provider. If you have questions, please contact your health care provider.

## 2022-11-11 NOTE — CONSULTS
Care Management Initial Consult    General Information  Assessment completed with: Patient,    Type of CM/SW Visit: Initial Assessment    Primary Care Provider verified and updated as needed: Yes   Readmission within the last 30 days: no previous admission in last 30 days      Reason for Consult: discharge planning  Advance Care Planning: Advance Care Planning Reviewed: no concerns identified, other (see comments) (Has on file with Matt)     General Information Comments: Lives with wife    Communication Assessment  Patient's communication style: spoken language (English or Bilingual)    Hearing Difficulty or Deaf: no   Wear Glasses or Blind: yes    Cognitive  Cognitive/Neuro/Behavioral: WDL                      Living Environment:   People in home: spouse     Current living Arrangements: other (see comments) ([facility])      Able to return to prior arrangements: yes  Living Arrangement Comments: One level [facility]    Family/Social Support:  Care provided by: self, spouse/significant other  Provides care for: no one  Marital Status:   Wife, Children  Blaire       Description of Support System: Supportive, Involved    Support Assessment: Adequate family and caregiver support    Current Resources:   Patient receiving home care services: No     Community Resources: None  Equipment currently used at home: cane, straight, walker, standard  Supplies currently used at home: None    Employment/Financial:  Employment Status: retired        Financial Concerns: No concerns identified           Lifestyle & Psychosocial Needs:  Social Determinants of Health     Tobacco Use: Not on file   Alcohol Use: Not on file   Financial Resource Strain: Not on file   Food Insecurity: Not on file   Transportation Needs: Not on file   Physical Activity: Not on file   Stress: Not on file   Social Connections: Not on file   Intimate Partner Violence: Not on file   Depression: At risk     PHQ-2 Score: 3   Housing Stability: Not  on file       Functional Status:  Prior to admission patient needed assistance:   Dependent ADLs:: Ambulation-walker, Bathing  Dependent IADLs:: Cleaning, Laundry, Shopping, Transportation  Assesssment of Functional Status: Not at  functional baseline, Needs assistance with handling finances, Needs assistance with bathing, Needs assistance with shopping, Needs assistance with transportation    Mental Health Status:          Chemical Dependency Status:                Values/Beliefs:  Spiritual, Cultural Beliefs, Christianity Practices, Values that affect care:                 Additional Information:  Patient  arrive via EMS with right hip pain after a fall on Monday. Went to clinic on 11/08/22 and had x rays which showed no fractures. Today (11/11/22) he got up to his recliner this AM and has been unable to get up since. No shortening. Does take warfarin but denies hitting his head .    Reports he lives in a [place]-level town home with his wife Blaire. At baseline is independent with  most I/ADLs except transportation. Uses a standard walker and a cane; no home care services; doesn t drive. Discussed discharge planning and explained both home care services and TCU services. Patient states he hasn t used either. Amenable to home care services if recommended.  Plans to return home with wife Blaire transporting patient on discharge. Other needs pending orthopedic and PT/OT recommendations.    LUCIO DILLON, RN/CM

## 2022-11-11 NOTE — PROGRESS NOTES
11/11/22 1420   Appointment Info   Signing Clinician's Name / Credentials (PT) Ladan Montelongo, PT, DPT   Quick Adds   Quick Adds Certification   Living Environment   People in Home spouse   Current Living Arrangements other (see comments)  ([facility])   Home Accessibility no concerns   Self-Care   Equipment Currently Used at Home walker, rolling   Fall history within last six months yes   Number of times patient has fallen within last six months 3   General Information   Onset of Illness/Injury or Date of Surgery 11/10/22   Referring Physician Dr. Bebo Vang   Patient/Family Therapy Goals Statement (PT) Wants to go home   Pertinent History of Current Problem (include personal factors and/or comorbidities that impact the POC) Fall with right pelvic fx   Existing Precautions/Restrictions fall   Weight-Bearing Status - RUE weight-bearing as tolerated   Weight-Bearing Status - LLE full weight-bearing   Weight-Bearing Status - RLE weight-bearing as tolerated   Bed Mobility   Bed Mobility supine-sit   Supine-Sit Charlotte (Bed Mobility) moderate assist (50% patient effort);verbal cues;2 person assist   Transfers   Transfers sit-stand transfer   Maintains Weight-bearing Status (Transfers) able to maintain   Sit-Stand Transfer   Sit-Stand Charlotte (Transfers) minimum assist (75% patient effort);2 person assist;verbal cues   Assistive Device (Sit-Stand Transfers) walker, front-wheeled   Gait/Stairs (Locomotion)   Charlotte Level (Gait) contact guard;2 person assist;verbal cues   Assistive Device (Gait) walker, front-wheeled   Distance in Feet (Required for LE Total Joints) 5   Distance in Feet (Gait) 10x2   Pattern (Gait) step-to   Deviations/Abnormal Patterns (Gait) alfredo decreased;gait speed decreased;festinating/shuffling   Maintains Weight-bearing Status (Gait) verbal cues to maintain   Clinical Impression   Criteria for Skilled Therapeutic Intervention Yes, treatment indicated   PT Diagnosis (PT)  Impaired functional mobility   Influenced by the following impairments weakness, impaired balance, pain   Functional limitations due to impairments transfers, gait, bed mobility   Clinical Presentation (PT Evaluation Complexity) Evolving/Changing   Clinical Presentation Rationale Pt presents as medically diagnosed   Clinical Decision Making (Complexity) moderate complexity   Planned Therapy Interventions (PT) bed mobility training;gait training;home exercise program;strengthening;patient/family education;transfer training   Anticipated Equipment Needs at Discharge (PT) walker, rolling;gait belt;wheelchair   Risk & Benefits of therapy have been explained evaluation/treatment results reviewed;care plan/treatment goals reviewed;patient   PT Total Evaluation Time   PT Eval, Moderate Complexity Minutes (53963) 10   Therapy Certification   Start of care date 11/11/22   Certification date from 11/11/22   Certification date to 12/15/22   Medical Diagnosis Fall, pelvic fx   Physical Therapy Goals   PT Frequency Daily   PT Predicted Duration/Target Date for Goal Attainment 11/16/22   PT Goals Transfers;Gait   PT: Transfers Supervision/stand-by assist;Sit to/from stand;Assistive device   PT: Gait Supervision/stand-by assist;Rolling walker;50 feet   Interventions   Interventions Quick Adds Gait Training;Therapeutic Activity   Therapeutic Activity   Symptoms Noted During/After Treatment Increased pain   Treatment Detail/Skilled Intervention Supine to sit with HOB elevated and use of leg , mod assist of 2, verbal cueing for technique and use of leg . Sit<>stand with FWW, min assist of 2, verbal cueing for hand placement. Sit to supine with mod assist of 2 and leg , verbal cueing for technique   Gait Training   Gait Training Minutes (02107) 10   Symptoms Noted During/After Treatment (Gait Training) increased pain   Treatment Detail/Skilled Intervention verbal cueing for   Reader Level (Gait Training)  contact guard   Physical Assistance Level (Gait Training) 2 person assist   Weight Bearing (Gait Training) weight-bearing as tolerated   Assistive Device (Gait Training) rolling walker   Pattern Analysis (Gait Training) swing-to gait   Gait Analysis Deviations decreased alfredo;decreased swing-to-stance ratio;decreased toe-to-floor clearance;decreased step length   Impairments (Gait Analysis/Training) strength decreased;pain;balance impaired   PT Discharge Planning   PT Plan Increase gait distance, balance, safety   PT Discharge Recommendation (DC Rec) (S)  Transitional Care Facility;home with home care physical therapy   PT Rationale for DC Rec Patient needing assist of 2 for bed mobility, transfers and gait, high fall risk. If home: assist of 1-2 with transfers and gait, assist of 2 for bed mobility. Recommend wheelchair for increased ambulation distances.   PT Brief overview of current status Amb 10ft with FWW, CGA of 2; bed mobility mod assist of 2   Total Session Time   Timed Code Treatment Minutes 10   Total Session Time (sum of timed and untimed services) 20   [facility]  OUTPATIENT PHYSICAL THERAPY EVALUATION  PLAN OF TREATMENT FOR OUTPATIENT REHABILITATION  (COMPLETE FOR INITIAL CLAIMS ONLY)  Patient's Last Name, First Name, M.I.  YOB: 1945  Carlos Ch                        Provider's Name  [facility] Medical Record No.  [id number]                             Onset Date:  11/10/22   Start of Care Date:  11/11/22   Type:     _X_PT   ___OT   ___SLP Medical Diagnosis:  Fall, pelvic fx              PT Diagnosis:  Impaired functional mobility Visits from SOC:  1     See note for plan of treatment, functional goals and certification details    I CERTIFY THE NEED FOR THESE SERVICES FURNISHED UNDER        THIS PLAN OF TREATMENT AND WHILE UNDER MY CARE     (Physician co-signature of this document indicates review and certification of  the therapy plan).

## 2022-11-11 NOTE — CONSULTS
ORTHOPEDIC CONSULTATION    Consultation  Carlos Ch,  1945, MRN [id number]    [unfilled]  Closed nondisplaced fracture of pelvis, unspecified part of pelvis, initial encounter (H) [S32.9XXA]    PCP: Matt Ragland, [phone]   Code status:  No CPR- Do NOT Intubate       Extended Emergency Contact Information  Primary Emergency Contact: AMBER CH  Address: [address] [place] [address]           [place] [address] [place]  Home Phone: [phone]  Mobile Phone: [phone]  Relation: Spouse         IMPRESSION:  -Closed non-displaced Right pubic body fracture with extension to Right inferior pubic ramus after mechanical fall, stable & CMS distally intact  -Right shoulder pain after mechanical fall     PLAN:  This patient was discussed with Dr. Winn, on-call surgeon for [facility] and they are in agreement with the following plan.     -Pelvis CT shows a nondisplaced Right pubic body fracture with extension to Right inferior pubic ramus. No hip fracture noted. His CMS distally is intact. This is stable and do not recommend any acute surgical intervention.  -Anticipate significant discomfort for first 2 weeks, then typically improved but fulll healing usually 6 weeks or longer.  -WBAT, use walker PRN for balance and support.  -PT/OT eval.  -Pain control per hospitalist.  -Reviewed shoulder Right shoulder xray from [facility]. No fracture noted. Recommend ice. Pain control.   -Likely will discharge to TCU pending progress with PT/OT.    Thank you for including [facility] in the care of Carlos Ch. It has been a pleasure participating in Carlos Ch's care.      CHIEF COMPLAINT: Right pelvic and groin pain    HISTORY OF PRESENT ILLNESS:  The patient is seen in orthopedic consultation at the request of Dr. Vang.  The patient is a 77 year old male with a past medical history of atrial fibrillation on warfarin, autoimmune hepatitis and chronic idiopathic  postoperative purpura on azathioprine, hypertension, BPH, and parkinsonism who fell 3 days ago on his right side. He had immediate pain and wasn't able to bear weight well. He was seen at a clinic and xrays didn't show any fracture of his right hip. He came to the ED because he could not bear weight. CT here shows a nondisplaced Right pubic body fracture with extension to Right inferior pubic ramus. He denies any bilateral lower extremity weakness, numbness or tingling. He states that he also fell on his shoulder and he's still having shoulder pain as well. He denies any weakness, numbness or tingling in his right upper extremity. He lives in a one level home. He uses a walker and cane intermittently at baseline.    PAST MEDICAL HISTORY:  Past Medical History:   Diagnosis Date     Tachy-wesly syndrome (H)      ALLERGIES:   Review of patient's allergies indicates   Allergies   Allergen Reactions     Clopidogrel Rash     Celecoxib Rash     Sulfa Drugs Rash     MEDICATIONS UPON ADMISSION:  Medications were reviewed.  They include:   (Not in a hospital admission)    SOCIAL HISTORY:   he      FAMILY HISTORY:  family history includes Coronary Artery Disease in his brother; Diabetes in his brother.      REVIEW OF SYSTEMS:   Reviewed with patient. See HPI, otherwise negative     PHYSICAL EXAMINATION:  Vitals: Temp:  [97.8  F (36.6  C)-98.4  F (36.9  C)] 97.8  F (36.6  C)  Pulse:  [73-95] 95  Resp:  [18-20] 18  BP: (143-177)/() 158/89  SpO2:  [93 %-100 %] 93 %  General: On examination, the patient is resting comfortably, NAD, awake and alert and oriented to person, place, time, and and general circumstances   SKIN: There is no evidence of rashes, lesions or skin openings over right pelvis and hip. Ecchymosis noted over Right hip.  Pulses:  Palpable Right popliteal & dorsalis pedis pulses.  Sensation: Sensation intact to light touch in L2-S1 dermatomes bilaterally.  Tenderness: Moderate tenderness overlying Right groin.  No tenderness with gentle log roll of Right lower extremity. Bilateral calves soft & nontender.  ROM: Moves all toes bilaterally. Pain with active right hip flexion.  Motor: +5/5 knee extension, ankle DF, PF and EHL bilaterally.     RADIOGRAPHIC EVALUATION:  Personally reviewed    11/10/2022 CT Pelvis:   1.  Both hips are negative for fracture or CT evidence of avascular necrosis.  2.  There is a nondisplaced fracture of the right pubic body which extends a short distance into the right inferior pubic ramus. No disruption of the symphysis pubis.  3.  No additional fractures are identified.  4.  There is some soft tissue stranding about the fracture, but no evidence for organized hematoma.  5.  There is some additional edema lateral to the right hip which could represent direct impact injury/contusion, but again no evidence for hematoma.  6.  Advanced degenerative change at the SI joints bilaterally with near ankylosis on the right.    11/8/2022 Xray Right shoulder: No fractures are identified. Normal glenohumeral alignment. Mild degenerative changes in the acromial intramuscular joint. Cardiac device.    PERTINENT LABS:  Lab Results: personally reviewed.   No visits with results within 2 Month(s) from this visit.   Latest known visit with results is:   No results found for any previous visit.       Jodi Vieira PA-C  Date: 11/11/2022  Time: 1:03 PM    CC1:   Bebo Vang MD    CC2:   Clinic, [facility]

## 2022-11-11 NOTE — CONSULTS
[facility] Orthopedic Consultation    Carlos Ch MRN# [id number]   Age: 77 year old YOB: 1945     Date of Admission:  11/10/2022    Reason for consult: Hip pain       Requesting physician: Bebo Vang       Level of consult: One-time consult to assist in determining a diagnosis and to recommend an appropriate treatment plan           Assessment and Plan:   Assessment:   Fracture of the pelvis      Plan:   Ambulate  Continue supportive and symptomatic treatment  Start to resume WBAT with a walker  Pain control measures  Additional problems to be followed by medicine physician  After discussion of diagnosis and treatment patient can advance to weightbearing as tolerated.  He may have some initial pain in the right lower extremity however fracture pattern of the pubic ramus is stable and nondisplaced.  I would recommend moving forward with a walker to help with ambulation.  He can use pain medication as needed to help with symptom control.  We would want to see him back in 10 to 14 days for follow-up and repeat x-ray to confirm fracture stability.  Please contact [facility] at [phone] to schedule an appointment with any arthritis service line physician.  This was reviewed with the patient and his daughter.  Patient was in agreement with the plan.  In addition, patient may need home health aide to assist with bathing and daily cares.  Social work or care coordinator to help facilitate this if possible.  Patient's questions were answered.  Thank you for including us in the care of this patient.              Chief Complaint:   RIGHT inferior pubic rami fracture              History of Present Illness:   This patient is a 77 year old male who presents with the following condition requiring a hospital admission:    History is obtained from the patient.  Patient reports a fall a few days ago.  He presented for care at that time and had x-rays done that showed no  obvious injury.  Pain persisted for a few days.  Pain worsened to the point where he was unable to ambulate or care for himself.  He presented to the emergency department for further evaluation.  CT scan obtained here at the hospital showed a nondisplaced inferior pubic ramus fracture of the right side.  Orthopedics was consulted to assist with plan moving forward.      Pelvis fracture (inferior pubic rami fracture)            Past Medical History:   I have reviewed this patient's past medical history          Past Surgical History:     I have reviewed this patient's past surgical history and commented on sigificant events within the HPI  Past Surgical History:   Procedure Laterality Date     CV TEMPORARY PACEMAKER INSERTION               Social History:     Social History     Tobacco Use     Smoking status: Not on file     Smokeless tobacco: Not on file   Substance Use Topics     Alcohol use: Not on file             Family History:   I have reviewed this patient's family history and commented on sigificant items within the HPI          Immunizations:   Immunization status is unknown          Allergies:     Allergies   Allergen Reactions     Clopidogrel Rash     Celecoxib Rash     Sulfa Drugs Rash             Medications:     Current Facility-Administered Medications   Medication     atorvastatin (LIPITOR) tablet 40 mg     azaTHIOprine (IMURAN) tablet 50 mg     HYDROmorphone (DILAUDID) injection 0.2 mg     lidocaine (LMX4) cream     lidocaine 1 % 0.1-1 mL     lisinopril (ZESTRIL) tablet 20 mg     melatonin tablet 1 mg     metoprolol succinate ER (TOPROL-XL) 24 hr half-tab 12.5 mg     ondansetron (ZOFRAN ODT) ODT tab 4 mg    Or     ondansetron (ZOFRAN) injection 4 mg     ondansetron (ZOFRAN ODT) ODT tab 4 mg     sodium chloride (PF) 0.9% PF flush 3 mL     sodium chloride (PF) 0.9% PF flush 3 mL     terazosin (HYTRIN) capsule 1 mg     warfarin ANTICOAGULANT (COUMADIN) tablet 7.5 mg     Warfarin Dose Required Daily -  Pharmacist Managed     Current Outpatient Medications   Medication Sig     aspirin 81 MG EC tablet Take 81 mg by mouth At Bedtime     atorvastatin (LIPITOR) 40 MG tablet Take 40 mg by mouth At Bedtime     azaTHIOprine (IMURAN) 50 MG tablet Take 50 mg by mouth daily     calcium polycarbophil (FIBERCON) 625 MG tablet Take 2 tablets by mouth daily     finasteride (PROSCAR) 5 MG tablet Take 5 mg by mouth At Bedtime     lisinopril (ZESTRIL) 20 MG tablet Take 20 mg by mouth daily     metoprolol succinate ER (TOPROL-XL) 25 MG 24 hr tablet Take 12.5 mg by mouth daily     multivitamin w/minerals (THERA-VIT-M) tablet Take 1 tablet by mouth daily     ondansetron (ZOFRAN ODT) 4 MG ODT tab Take 4 mg by mouth every 8 hours as needed for nausea     terazosin (HYTRIN) 1 MG capsule Take 1 mg by mouth At Bedtime     warfarin ANTICOAGULANT (COUMADIN) 5 MG tablet Take 5-7.5 mg by mouth See Admin Instructions 5 mg on Mondays and Thursdays; 7.5 mg all other days     acetaminophen (TYLENOL) 325 MG tablet Take 325-650 mg by mouth every 6 hours as needed for mild pain             Review of Systems:   CONSTITUTIONAL: NEGATIVE for fever, chills, change in weight  ENT/MOUTH: NEGATIVE for ear, mouth and throat problems  RESP: NEGATIVE for significant cough or SOB  CV: NEGATIVE for chest pain, palpitations or peripheral edema          Physical Exam:   Initial vitals were reviewed  All vitals have been reviewed  Musculoskeletal:   Patient does report discomfort with palpation over the pelvic region.  He has pain more so on the right side.  He does have smooth internal and external rotation of both the left hip and the right hip.  He has pain and does guard on examination of the right hip.  Skin is intact.  No ecchymosis or effusion currently.  He can move his feet up and down without difficulty.  Negative straight leg raise.  He does have decreased sensation bilaterally secondary to longstanding neuropathy.  Exam otherwise unremarkable.              Data:   All laboratory data reviewed  All imaging studies reviewed by me.  CT scan does show a nondisplaced fracture of the right inferior pubic ramus.  CT otherwise unremarkable.     Attestation:  I have reviewed today's vital signs, notes, medications, labs and imaging.    Duncan Malin PA-C

## 2022-11-11 NOTE — CONSULTS
Care Management Follow Up    Length of Stay (days): 1    Expected Discharge Date: 11/12/2022     Concerns to be Addressed: discharge planning, home safety     Patient plan of care discussed at interdisciplinary rounds:    Anticipated Discharge Disposition: Home, Home Care, Skilled Nursing Facility     Anticipated Discharge Services: None  Anticipated Discharge DME: Other (see comment) (Pending clinical progress)    Patient/family educated on Medicare website which has current facility and service quality ratings:    Education Provided on the Discharge Plan:    Patient/Family in Agreement with the Plan: yes    Referrals Placed by CM/SW:      Additional Information:  Met with patient and his daughter Josy ([phone]) at bedside. Both patient and daughter are concerned that patient will not be able to thrive at home due to spouse s limited ability to assist patient. They are interested in patient going to a TCU. Explained patient will be seen by PT/OT who will then make recommendations that may include home care services or TCU. Discussed if TCU is not recommended; family would private pay if necessary.    Discussed TCU locations with family and initial referrals sent in order of choice to: [facility], [facility], [facility], [facility], [facility], [facility], [facility].    1320 -- Call received from Utilization Review RN stating that patient's Admission Status was changed from Inpatient to Observation Status, necessitating a Condition Code 44 Letter be presented to patient.    Explained Code 44 Letter and MOON/Observation Status to patient and wife Blaire. This writer answered questions to their satisfaction and they verbalized understanding. Copy of documents given to family.    Discussed with patient and his wife Blaier, at length, Assisted Living Facilities, TCU and home care services. Answered her questions and provided Senior  Housing Book, Senior LinkAge card, and a Place for Mom brochure for community resources.      LUCIO DILLON RN/CM

## 2022-11-11 NOTE — H&P
[facility]    History and Physical - Hospitalist Service       Date of Admission:  11/10/2022    Assessment & Plan      Carlos Ch is a 77 year old male admitted on 11/10/2022. 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  Orthopedics consulted.  Has a history of orthostatic hypotension and tachybradycardia syndrome    Principal Problem:    Closed nondisplaced fracture of pelvis, unspecified part of pelvis, initial encounter (H)    Parkinsonism    Assessment: He has a history of vertigo and parkinsonism, previously was on Sinemet but stopped secondary to his unsteadiness.  He has also had decreased p.o. intake.  Unclear but suspect multifactorial etiologies contributed to his fall.  Now has a fracture of the right pubic body extending into the right inferior pubic ramus; consulted orthopedics.  Discussed with the patient pain control as well as having orthopedics evaluate.  He is notably DNR but has not decided if he would temporarily suspend the code status if there is intervention offered tomorrow.  Another potential etiology is his history of tachybradycardia syndrome which could have led to a near syncopal-like episode.  He was hospitalized in 3/2022 for this.    Plan:   - admit to inpatient  - npo at midnight  - consult to orthopedics  - pain control - low dose dilaudid q3 hrs PRN  - nursing cares and avoid weight bearing until seen by ortho  - follow up covid pcr - still pending   - check orthostatics       Anticoagulation monitoring, INR range 2-3    PAF (paroxysmal atrial fibrillation) (H)    Assessment: stable. On warfarin and metoprolol.     Plan:   - pharmacy to dose warfarin  - continue beta blocker      Arthritis    Assessment/plan: pain control as in first issue above        Autoimmune hepatitis (H)    Chronic ITP (idiopathic thrombocytopenic purpura) (H)    Assessment: on azathioprine for both of these.    Plan:   -continue   -follow cbc      Benign essential hypertension    Assessment: on metoprolol and terazosin    Plan: -continue      BPH with obstruction/lower urinary tract symptoms    Assessment: on terazosin.    Plan: -continue       Coronary atherosclerosis    Pacemaker    Tachy-wesly syndrome (H)    Assessment: on baby asa. No chest pain.  As above, tachybradycardia syndrome could also be contributing to his near syncopal episode. Has a pacemaker.     Plan:   - follow clinically, ekg and troponin if chest pain          Diet: NPO for Medical/Clinical Reasons Except for: Meds, Ice Chips    DVT Prophylaxis: on warfarin; Pneumatic Compression Devices  Romero Catheter: Not present  Central Lines: None  Cardiac Monitoring: None  Code Status: No CPR- Do NOT Intubate      Clinically Significant Risk Factors Present on Admission                # Drug Induced Coagulation Defect: home medication list includes an anticoagulant medication  # Thrombocytopenia: Lowest platelets = 98 (Ref range: 150-450) in last 2 days, will monitor for bleeding   # Hypertension: home medication list includes antihypertensive(s)             Disposition Plan      Expected Discharge Date: 11/12/2022                The patient's care was discussed with the Patient and ED/ER Team.    Bebo Vang MD  Hospitalist Service  [facility]  Securely message with the Vocera Web Console (learn more here)  Text page via [employer] Paging/Directory         ______________________________________________________________________    Chief Complaint   I fell and now my hip hurts    History is obtained from the patient    History of Present Illness   Carlos Ch is a 77 year old male who  atrial fibrillation on warfarin, autoimmune hepatitis and chronic idiopathic postoperative purpura on azathioprine,  hypertension, BPH, and parkinsonism, who sustained a fall and found with pelvic fracture.     Patient presented to urgent care on 11/8 where XR of the right femur shoulder and hip were negative.  He was supposed to follow-up with orthopedics within a week.  His ongoing pain and inability to bear weight prompted presentation.  Notably he has a history of parkinsonism, and he has tried Sinemet.  However, he has been having some vertigo, unclear if related to the disease process vs medication or both -he has stopped the Sinemet.  However, he denies having any room spinning dizziness or sensation of falling over, but more so a disequilibrium sensation.    He has not had any other symptoms, no fevers or chills or urinary symptoms or changes with bowel movements.  No chest or abdominal pain.  We discussed his CODE STATUS and he is clear about being DNR and DNI-he is unsure yet what he would decide if orthopedics offers potential intervention. Remote tobacco use in his twenties, no etoh use.     Review of Systems    The 10 point Review of Systems is negative other than noted in the HPI or here.      Past Medical History    I have reviewed this patient's medical history and updated it with pertinent information if needed.   Past Medical History:   Diagnosis Date     Tachy-wesly syndrome (H)        Past Surgical History   I have reviewed this patient's surgical history and updated it with pertinent information if needed.  Past Surgical History:   Procedure Laterality Date     CV TEMPORARY PACEMAKER INSERTION         Social History   I have reviewed this patient's social history and updated it with pertinent information if needed.       Family History   I have reviewed this patient's family history and updated it with pertinent information if needed.  Family History   Problem Relation Age of Onset     Diabetes Brother      Coronary Artery Disease Brother        Prior to Admission Medications   Prior to Admission Medications    Prescriptions Last Dose Informant Patient Reported? Taking?   acetaminophen (TYLENOL) 325 MG tablet  Pharmacy Yes No   Sig: Take 325-650 mg by mouth every 6 hours as needed for mild pain   aspirin 81 MG EC tablet 11/9/2022 Pharmacy Yes Yes   Sig: Take 81 mg by mouth At Bedtime   atorvastatin (LIPITOR) 40 MG tablet 11/9/2022 Pharmacy Yes Yes   Sig: Take 40 mg by mouth At Bedtime   azaTHIOprine (IMURAN) 50 MG tablet 11/9/2022 Pharmacy Yes Yes   Sig: Take 50 mg by mouth daily   calcium polycarbophil (FIBERCON) 625 MG tablet 11/9/2022 Pharmacy Yes Yes   Sig: Take 2 tablets by mouth daily   finasteride (PROSCAR) 5 MG tablet 11/10/2022 Pharmacy Yes Yes   Sig: Take 5 mg by mouth At Bedtime   lisinopril (ZESTRIL) 20 MG tablet 11/10/2022 Pharmacy Yes Yes   Sig: Take 20 mg by mouth daily   metoprolol succinate ER (TOPROL-XL) 25 MG 24 hr tablet 11/10/2022 Pharmacy Yes Yes   Sig: Take 12.5 mg by mouth daily   multivitamin w/minerals (THERA-VIT-M) tablet 11/10/2022 Pharmacy Yes Yes   Sig: Take 1 tablet by mouth daily   ondansetron (ZOFRAN ODT) 4 MG ODT tab PRN  Yes Yes   Sig: Take 4 mg by mouth every 8 hours as needed for nausea   terazosin (HYTRIN) 1 MG capsule 11/10/2022  Yes Yes   Sig: Take 1 mg by mouth At Bedtime   warfarin ANTICOAGULANT (COUMADIN) 5 MG tablet 11/10/2022 Pharmacy Yes Yes   Sig: Take 5-7.5 mg by mouth See Admin Instructions 5 mg on Mondays and Thursdays; 7.5 mg all other days      Facility-Administered Medications: None     Allergies   Allergies   Allergen Reactions     Clopidogrel Rash     Celecoxib Rash     Sulfa Drugs Rash       Physical Exam   Vital Signs: Temp: 98  F (36.7  C) Temp src: Oral BP: (!) 177/97 Pulse: 87   Resp: 18 SpO2: 98 % O2 Device: None (Room air)    Weight: 0 lbs 0 oz      GENERAL:  Alert, appears comfortable, in no acute distress, appears stated age   HEAD:  Normocephalic, without obvious abnormality, atraumatic   EYES:  PERRL, conjunctiva/corneas clear, no scleral icterus, EOM's  intact   NOSE: Nares normal, septum midline, mucosa normal, no drainage   THROAT: Lips, mucosa, and tongue normal; teeth and gums normal, mouth moist   NECK: Supple, symmetrical, trachea midline   BACK:   Symmetric, no curvature, ROM normal   LUNGS:   Clear to auscultation bilaterally, no rales, rhonchi, or wheezing, symmetric chest rise on inhalation, respirations unlabored, on room air    CHEST WALL:  No tenderness or conspicuous deformity   HEART:  Irregularly irregular; \S1 and S2 normal, no murmur, rub, or gallop, no conspicuous jugular venous distention noted, peripheral pulses intact    ABDOMEN:   Soft, non-tender, bowel sounds auscultated in all four quadrants, no masses, no organomegaly, no rebound or guarding   EXTREMITIES: Extremities normal, atraumatic, no cyanosis or edema    MSK  lower extremities limited range of motion secondary to pain, especially right leg abduction   SKIN: Dry to touch, no exanthems in the visualized areas   NEURO: Alert, oriented x3, moves all four extremities of their own volition; a bit tremulous in upper extremities    PSYCH: Cooperative and behavior is appropriate        Data   Data reviewed today: I reviewed all medications, new labs and imaging results over the last 24 hours. I personally reviewed no images or EKG's today.    Recent Labs   Lab 11/10/22  2104   WBC 6.6   HGB 15.4   MCV 94   PLT 98*   INR 2.55*       Recent Results (from the past 24 hour(s))   CT Pelvis Bone wo Contrast    Narrative    EXAM: CT PELVIS BONE WO CONTRAST  LOCATION: [facility]  DATE/TIME: 11/10/2022 5:14 PM    INDICATION: Hip pain without trauma or other complicating feature; no hip x-ray result available.    COMPARISON: X-ray evaluation 11/8/2022.    TECHNIQUE: CT scan of the pelvis was performed without IV contrast. Multiplanar reformats were obtained. Dose reduction techniques were used.    CONTRAST: None.    FINDINGS: Both hips are negative for fracture or CT  evidence of avascular necrosis. There is degenerative change at both hips with superior joint space narrowing and slight osteophytic spurring. Findings are fairly symmetric.    There is a fracture of the right pubic body. This extends a short distance into the right inferior pubic ramus. No disruption of the symphysis pubis. No additional fractures are identified. 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 Degenerative change at   both SI joints.    There is soft tissue stranding surrounding the right pubic body fracture, but no significant organized hematoma. The intrapelvic contents are otherwise negative.       Impression    IMPRESSION:  1.  Both hips are negative for fracture or CT evidence of avascular necrosis.  2.  There is a nondisplaced fracture of the right pubic body which extends a short distance into the right inferior pubic ramus. No disruption of the symphysis pubis.  3.  No additional fractures are identified.  4.  There is some soft tissue stranding about the fracture, but no evidence for organized hematoma.  5.  There is some additional edema lateral to the right hip which could represent direct impact injury/contusion, but again no evidence for hematoma.  6.  Advanced degenerative change at the SI joints bilaterally with near ankylosis on the right.

## 2022-11-11 NOTE — ED NOTES
Dr Ochoa informed pt refused shoulder xray as it was done a couple days ago at [facility].See Care Everywhere.  Attempting to get a hold of ortho Jodi SORIA.

## 2022-11-12 ENCOUNTER — APPOINTMENT (OUTPATIENT)
Dept: OCCUPATIONAL THERAPY | Facility: CLINIC | Age: 77
End: 2022-11-12
Payer: MEDICARE

## 2022-11-12 VITALS
TEMPERATURE: 97.5 F | DIASTOLIC BLOOD PRESSURE: 83 MMHG | HEIGHT: 72 IN | WEIGHT: 225 LBS | RESPIRATION RATE: 16 BRPM | BODY MASS INDEX: 30.48 KG/M2 | SYSTOLIC BLOOD PRESSURE: 137 MMHG | HEART RATE: 93 BPM | OXYGEN SATURATION: 94 %

## 2022-11-12 LAB
HOLD SPECIMEN: NORMAL
INR PPP: 2.72 (ref 0.85–1.15)

## 2022-11-12 PROCEDURE — 250N000013 HC RX MED GY IP 250 OP 250 PS 637: Performed by: STUDENT IN AN ORGANIZED HEALTH CARE EDUCATION/TRAINING PROGRAM

## 2022-11-12 PROCEDURE — 97535 SELF CARE MNGMENT TRAINING: CPT | Mod: GO

## 2022-11-12 PROCEDURE — 96376 TX/PRO/DX INJ SAME DRUG ADON: CPT

## 2022-11-12 PROCEDURE — 250N000011 HC RX IP 250 OP 636: Performed by: STUDENT IN AN ORGANIZED HEALTH CARE EDUCATION/TRAINING PROGRAM

## 2022-11-12 PROCEDURE — 97129 THER IVNTJ 1ST 15 MIN: CPT | Mod: GO

## 2022-11-12 PROCEDURE — 250N000012 HC RX MED GY IP 250 OP 636 PS 637: Performed by: STUDENT IN AN ORGANIZED HEALTH CARE EDUCATION/TRAINING PROGRAM

## 2022-11-12 PROCEDURE — G0378 HOSPITAL OBSERVATION PER HR: HCPCS | Mod: CS

## 2022-11-12 PROCEDURE — 99217 PR OBSERVATION CARE DISCHARGE: CPT | Performed by: STUDENT IN AN ORGANIZED HEALTH CARE EDUCATION/TRAINING PROGRAM

## 2022-11-12 PROCEDURE — 36415 COLL VENOUS BLD VENIPUNCTURE: CPT | Performed by: STUDENT IN AN ORGANIZED HEALTH CARE EDUCATION/TRAINING PROGRAM

## 2022-11-12 PROCEDURE — 85610 PROTHROMBIN TIME: CPT | Performed by: STUDENT IN AN ORGANIZED HEALTH CARE EDUCATION/TRAINING PROGRAM

## 2022-11-12 RX ORDER — NALOXONE HYDROCHLORIDE 0.4 MG/ML
0.4 INJECTION, SOLUTION INTRAMUSCULAR; INTRAVENOUS; SUBCUTANEOUS
Status: DISCONTINUED | OUTPATIENT
Start: 2022-11-12 | End: 2022-11-12 | Stop reason: HOSPADM

## 2022-11-12 RX ORDER — OXYCODONE HYDROCHLORIDE 5 MG/1
2.5-5 TABLET ORAL EVERY 6 HOURS PRN
Qty: 7 TABLET | Refills: 0 | Status: SHIPPED | OUTPATIENT
Start: 2022-11-12 | End: 2022-11-12

## 2022-11-12 RX ORDER — WARFARIN SODIUM 7.5 MG/1
7.5 TABLET ORAL
Status: DISCONTINUED | OUTPATIENT
Start: 2022-11-12 | End: 2022-11-12 | Stop reason: HOSPADM

## 2022-11-12 RX ORDER — OXYCODONE HYDROCHLORIDE 5 MG/1
2.5-5 TABLET ORAL EVERY 6 HOURS PRN
Qty: 7 TABLET | Refills: 0 | Status: SHIPPED | OUTPATIENT
Start: 2022-11-12

## 2022-11-12 RX ORDER — NALOXONE HYDROCHLORIDE 0.4 MG/ML
0.2 INJECTION, SOLUTION INTRAMUSCULAR; INTRAVENOUS; SUBCUTANEOUS
Status: DISCONTINUED | OUTPATIENT
Start: 2022-11-12 | End: 2022-11-12 | Stop reason: HOSPADM

## 2022-11-12 RX ADMIN — AZATHIOPRINE 50 MG: 50 TABLET ORAL at 07:49

## 2022-11-12 RX ADMIN — HYDROMORPHONE HYDROCHLORIDE 0.2 MG: 0.2 INJECTION, SOLUTION INTRAMUSCULAR; INTRAVENOUS; SUBCUTANEOUS at 13:04

## 2022-11-12 RX ADMIN — METOPROLOL SUCCINATE 12.5 MG: 25 TABLET, EXTENDED RELEASE ORAL at 08:10

## 2022-11-12 RX ADMIN — HYDROMORPHONE HYDROCHLORIDE 0.2 MG: 0.2 INJECTION, SOLUTION INTRAMUSCULAR; INTRAVENOUS; SUBCUTANEOUS at 07:50

## 2022-11-12 RX ADMIN — HYDROMORPHONE HYDROCHLORIDE 0.2 MG: 0.2 INJECTION, SOLUTION INTRAMUSCULAR; INTRAVENOUS; SUBCUTANEOUS at 16:14

## 2022-11-12 RX ADMIN — LISINOPRIL 20 MG: 20 TABLET ORAL at 08:10

## 2022-11-12 ASSESSMENT — ACTIVITIES OF DAILY LIVING (ADL)
ADLS_ACUITY_SCORE: 41

## 2022-11-12 NOTE — PLAN OF CARE
Problem: Plan of Care - These are the overarching goals to be used throughout the patient stay.    Goal: Optimal Comfort and Wellbeing  Outcome: Adequate for Care Transition     PRIMARY DIAGNOSIS: ACUTE PAIN  OUTPATIENT/OBSERVATION GOALS TO BE MET BEFORE DISCHARGE:  1. Pain Status: Improved-controlled with oral pain medications.    2. Return to near baseline physical activity: No    3. Cleared for discharge by consultants (if involved): Yes    Discharge Planner Nurse   Safe discharge environment identified: Yes  Barriers to discharge: Yes       Entered by: Jayashree Cain RN 11/12/2022 5:09 AM     Pt A&Ox4. VSS on RA. CMS intact except baseline neuropathy. Voiding adequately. Up with assist of 1 to edge of bed. Pain managed with rest. Discharge to tcu pending.

## 2022-11-12 NOTE — PLAN OF CARE
Goal Outcome Evaluation:    PRIMARY DIAGNOSIS: ACUTE PAIN  OUTPATIENT/OBSERVATION GOALS TO BE MET BEFORE DISCHARGE:  1. Pain Status: Improved but still requiring IV narcotics (no PO meds ordered). No pain at rest but 10/10 pain with movement.    2. Return to near baseline physical activity: No, but improving. Able to ambulate short distances.    3. Cleared for discharge by consultants (if involved): N/A    Discharge Planner Nurse   Safe discharge environment identified: Yes, plan to discharge to TCU at 1645  Barriers to discharge: No       Entered by: Lupe Sorensen RN 11/12/2022 2:43 PM

## 2022-11-12 NOTE — PROGRESS NOTES
"PRIMARY DIAGNOSIS: \"GENERIC\" NURSING  OUTPATIENT/OBSERVATION GOALS TO BE MET BEFORE DISCHARGE:  1. ADLs back to baseline: No    2. Activity and level of assistance: Up with maximum assistance. Consider SW and/or PT evaluation.     3. Pain status: Improved-controlled with oral pain medications.    4. Return to near baseline physical activity: No     Discharge Planner Nurse   Safe discharge environment identified: No  Barriers to discharge: Yes       Entered by: Taya Payton RN 11/11/2022 6:26 PM     Please review provider order for any additional goals.   Nurse to notify provider when observation goals have been met and patient is ready for discharge.    Pt arrived on this unit at approximately 1540.  Settled into bed, reviewed use of call light.  Encouraged pt to request pain medication prior to transferring or ambulating.  He was able to transfer to the chair with tolerable pain levels.  He did not want any PRN medications at the time.      "

## 2022-11-12 NOTE — DISCHARGE SUMMARY
"[facility]  Hospitalist Discharge Summary      Date of Admission:  11/10/2022  Date of Discharge:  11/12/2022  Discharging Provider: Bebo Vang MD  Discharge Service: Hospitalist Service    Discharge Diagnoses   Closed nondisplaced right pubic body fracture with extension to right inferior pubic ramus after mechanical fall  Right shoulder pain after mechanical fall    Follow-ups Needed After Discharge   Follow-up Appointments     Follow Up and recommended labs and tests      Follow up with Nursing home physician.               Unresulted Labs Ordered in the Past 30 Days of this Admission     No orders found from 10/11/2022 to 11/11/2022.           Discharge Disposition   Discharged to rehabilitation facility  Condition at discharge: Stable     Hospital Course      Carlos Ch is a 77 year old male admitted on 11/10/2022. 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  Orthopedics consulted.  Has a history of orthostatic hypotension and tachybradycardia syndrome s/p pacemaker.    Orthopedics evaluated; he did not have any indications for surgical intervention; as such, recommendations were for conservative cares and rehabilitation and therapies.  Physical and Occupational Therapy evaluated and recommended TCU.  Both the patient and his wife were on board with these recommendations and social work assisted in finding placement.  On 11/12, the patient was discharged to a TCU and has remained vitally and hemodynamically stable.  He should follow-up with orthopedics.  Contact number was provided as well as here: \"contact [facility] orthopedics at [phone] to schedule an appointment with any arthritis service line physician\" additionally he should follow-up with his PCP or facility MD/DO within 3 To 5 " days for a posthospitalization follow-up.        Consultations This Hospital Stay   ORTHOPEDIC SURGERY IP CONSULT  PHARMACY TO DOSE WARFARIN  CARE MANAGEMENT / SOCIAL WORK IP CONSULT  PHYSICAL THERAPY ADULT IP CONSULT  OCCUPATIONAL THERAPY ADULT IP CONSULT  PHYSICAL THERAPY ADULT IP CONSULT  OCCUPATIONAL THERAPY ADULT IP CONSULT    Code Status   No CPR- Do NOT Intubate    Time Spent on this Encounter   I, Bebo Vang MD, personally saw the patient today and spent greater than 30 minutes discharging this patient.       Bebo Vang MD  [address] [address]  Phone: [phone]  Fax: [phone]  ______________________________________________________________________    Physical Exam   Vital Signs: Temp: 97.5  F (36.4  C) Temp src: Oral BP: 137/83 Pulse: 93   Resp: 16 SpO2: 94 % O2 Device: None (Room air)    Weight: 225 lbs 0 oz    General: alert, oriented, and in no acute distress  Pulmonary: clear to auscultation bilaterally, normal respiratory effort, on room air, no rales or wheezes or evidence of accessory muscle use  CVS:irregularly irregular, no murmurs, rubs, or gallops; no blatant jugular venous distention; no extremity edema and extremities are warm to the touch  GI: soft, nontender, BS+, no rebound or guarding, no conspicuous organomegaly   Neuro: nonfocal, moves all extremities of own volition  MSK: some reduced ROM from pain in upper right and lower right but otherwise not tender to palpation and preserved mobility though as noted limited 2/2 to pain  Psych: appropriate       Primary Care Physician   [facility]    Discharge Orders      General info for SNF    Length of Stay Estimate: Short Term Care: Estimated # of Days <30  Condition at Discharge: Stable  Level of care:skilled   Rehabilitation Potential: Fair  Admission H&P remains valid and up-to-date: Yes  Recent Chemotherapy: N/A  Use Nursing Home Standing  Orders: Yes     Mantoux instructions    Give two-step Mantoux (PPD) Per Facility Policy Yes     Follow Up and recommended labs and tests    Follow up with Nursing home physician.     Reason for your hospital stay    Fracture in a pelvic bone that was not a surgical option; time and rehabilitation will help with recovery     Activity - Up with nursing assistance     No CPR- Do NOT Intubate     Physical Therapy Adult Consult    Evaluate and treat as clinically indicated.    Reason:  pelvic fracture, non-operable.     Occupational Therapy Adult Consult    Evaluate and treat as clinically indicated.    Reason:  pelvic fracture, non-operable     Fall precautions     Hip precautions     Diet    Follow this diet upon discharge: Orders Placed This Encounter      Low Saturated Fat Na <2400 mg       Significant Results and Procedures   Results for orders placed or performed during the hospital encounter of 11/10/22   CT Pelvis Bone wo Contrast    Narrative    EXAM: CT PELVIS BONE WO CONTRAST  LOCATION: [facility]  DATE/TIME: 11/10/2022 5:14 PM    INDICATION: Hip pain without trauma or other complicating feature; no hip x-ray result available.    COMPARISON: X-ray evaluation 11/8/2022.    TECHNIQUE: CT scan of the pelvis was performed without IV contrast. Multiplanar reformats were obtained. Dose reduction techniques were used.    CONTRAST: None.    FINDINGS: Both hips are negative for fracture or CT evidence of avascular necrosis. There is degenerative change at both hips with superior joint space narrowing and slight osteophytic spurring. Findings are fairly symmetric.    There is a fracture of the right pubic body. This extends a short distance into the right inferior pubic ramus. No disruption of the symphysis pubis. No additional fractures are identified. 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 Degenerative change at   both SI joints.    There is soft tissue stranding surrounding the right pubic  body fracture, but no significant organized hematoma. The intrapelvic contents are otherwise negative.       Impression    IMPRESSION:  1.  Both hips are negative for fracture or CT evidence of avascular necrosis.  2.  There is a nondisplaced fracture of the right pubic body which extends a short distance into the right inferior pubic ramus. No disruption of the symphysis pubis.  3.  No additional fractures are identified.  4.  There is some soft tissue stranding about the fracture, but no evidence for organized hematoma.  5.  There is some additional edema lateral to the right hip which could represent direct impact injury/contusion, but again no evidence for hematoma.  6.  Advanced degenerative change at the SI joints bilaterally with near ankylosis on the right.           Discharge Medications   Current Discharge Medication List      CONTINUE these medications which have NOT CHANGED    Details   aspirin 81 MG EC tablet Take 81 mg by mouth At Bedtime      atorvastatin (LIPITOR) 40 MG tablet Take 40 mg by mouth At Bedtime      azaTHIOprine (IMURAN) 50 MG tablet Take 50 mg by mouth daily      calcium polycarbophil (FIBERCON) 625 MG tablet Take 2 tablets by mouth daily      finasteride (PROSCAR) 5 MG tablet Take 5 mg by mouth At Bedtime      lisinopril (ZESTRIL) 20 MG tablet Take 20 mg by mouth daily      metoprolol succinate ER (TOPROL-XL) 25 MG 24 hr tablet Take 12.5 mg by mouth daily      multivitamin w/minerals (THERA-VIT-M) tablet Take 1 tablet by mouth daily      ondansetron (ZOFRAN ODT) 4 MG ODT tab Take 4 mg by mouth every 8 hours as needed for nausea      terazosin (HYTRIN) 1 MG capsule Take 1 mg by mouth At Bedtime      warfarin ANTICOAGULANT (COUMADIN) 5 MG tablet Take 5-7.5 mg by mouth See Admin Instructions 5 mg on Mondays and Thursdays; 7.5 mg all other days      acetaminophen (TYLENOL) 325 MG tablet Take 325-650 mg by mouth every 6 hours as needed for mild pain           Allergies   Allergies    Allergen Reactions     Clopidogrel Rash     Celecoxib Rash     Sulfa Drugs Rash

## 2022-11-12 NOTE — PLAN OF CARE
AVS discharge instructions reviewed with patient. All questions answered. Patient left via wheelchair transport with all belongings.    PRIMARY DIAGNOSIS: ACUTE PAIN  OUTPATIENT/OBSERVATION GOALS TO BE MET BEFORE DISCHARGE:  1. Pain Status: Improved. IV dilaudid given only because PO analgesics not ordered.    2. Return to near baseline physical activity: No, slow moving d/t pain. Will continue with PT/OT at TCU.    3. Cleared for discharge by consultants (if involved): N/A    Discharge Planner Nurse   Safe discharge environment identified: Yes  Barriers to discharge: No       Entered by: Lupe Sorensen RN 11/12/2022 4:56 PM

## 2022-11-12 NOTE — PLAN OF CARE
Problem: Fall Injury Risk  Goal: Absence of Fall and Fall-Related Injury  Outcome: Progressing   Goal Outcome Evaluation:       VSS on room air.  Intermittent pain in hip, refused pain medication.  Pain free when not moving.  Adequate urine output.  Bed alarm activated and use of call light reinforced for safety.

## 2022-11-12 NOTE — PROGRESS NOTES
Care Management Discharge Note    Discharge Date: 11/12/2022     Discharge Disposition:  [facility] ([facility])    Discharge Services:  [facility] ([facility])    Discharge DME: None    Discharge Transportation: agency    Private pay costs discussed: transportation costs    PAS Confirmation Code:  [id number])    Patient/family educated on Medicare website which has current facility and service quality ratings:  Patient and family preference is [facility] ([facility])    Education Provided on the Discharge Plan:  AVS per bedside RN    Persons Notified of Discharge Plans: patient and family    Patient/Family in Agreement with the Plan: yes    Handoff Referral Completed: Yes    Additional Information:  Chart reviewed. CM met with patient and wife (bedside). Patient has been accepted at [facility] ([facility]). Patient and wife agreeable to this placement. Patient is requesting that CM arrange transportation to [facility] ([facility]). CM was able to arrange for [employer] wheelchair transportation for 4:45 pm today. Patient and wife updated. CM updated [facility] ([facility]) regarding the ride time. CM sent discharge orders.       CM completed [facility]- [id number]   [employer] wheelchair transport for 4:45pm today. Patient and wife aware that they may receive a bill for transportation and in agreement with this transportation.       Loan Geiger RN

## 2022-11-13 ENCOUNTER — LAB REQUISITION (OUTPATIENT)
Dept: LAB | Facility: CLINIC | Age: 77
End: 2022-11-13
Payer: MEDICARE

## 2022-11-13 DIAGNOSIS — I48.91 UNSPECIFIED ATRIAL FIBRILLATION (H): ICD-10-CM

## 2022-11-13 NOTE — PROGRESS NOTES
Occupational Therapy Discharge Summary    Reason for therapy discharge:    Discharged to transitional care facility.    Progress towards therapy goal(s). See goals on Care Plan in [facility] electronic health record for goal details.  Goals partially met.  Barriers to achieving goals:   discharge from facility.    Therapy recommendation(s):    Continued therapy is recommended.  Rationale/Recommendations:  continue to progress at TCU.

## 2022-11-13 NOTE — PROGRESS NOTES
Physical Therapy Discharge Summary    Reason for therapy discharge:    Discharged to transitional care facility.    Progress towards therapy goal(s). See goals on Care Plan in [facility] electronic health record for goal details.  Goals partially met.  Barriers to achieving goals:   limited tolerance for therapy and discharge from facility.    Therapy recommendation(s):    Continued therapy is recommended.  Rationale/Recommendations:  Continue at facility.

## 2022-11-14 ENCOUNTER — LAB REQUISITION (OUTPATIENT)
Dept: LAB | Facility: CLINIC | Age: 77
End: 2022-11-14
Payer: MEDICARE

## 2022-11-14 ENCOUNTER — PATIENT OUTREACH (OUTPATIENT)
Dept: CARE COORDINATION | Facility: CLINIC | Age: 77
End: 2022-11-14

## 2022-11-14 ENCOUNTER — TRANSITIONAL CARE UNIT VISIT (OUTPATIENT)
Dept: GERIATRICS | Facility: CLINIC | Age: 77
End: 2022-11-14
Payer: MEDICARE

## 2022-11-14 VITALS
WEIGHT: 225 LBS | BODY MASS INDEX: 30.48 KG/M2 | RESPIRATION RATE: 18 BRPM | HEART RATE: 89 BPM | OXYGEN SATURATION: 98 % | SYSTOLIC BLOOD PRESSURE: 142 MMHG | HEIGHT: 72 IN | DIASTOLIC BLOOD PRESSURE: 74 MMHG | TEMPERATURE: 98.2 F

## 2022-11-14 DIAGNOSIS — Z51.81 ENCOUNTER FOR THERAPEUTIC DRUG LEVEL MONITORING: ICD-10-CM

## 2022-11-14 DIAGNOSIS — R52 PAIN MANAGEMENT: ICD-10-CM

## 2022-11-14 DIAGNOSIS — R53.81 PHYSICAL DECONDITIONING: ICD-10-CM

## 2022-11-14 DIAGNOSIS — S32.9XXA CLOSED NONDISPLACED FRACTURE OF PELVIS, UNSPECIFIED PART OF PELVIS, INITIAL ENCOUNTER (H): ICD-10-CM

## 2022-11-14 DIAGNOSIS — R55 SYNCOPE, UNSPECIFIED SYNCOPE TYPE: Primary | ICD-10-CM

## 2022-11-14 LAB — INR PPP: 3.3 (ref 0.85–1.15)

## 2022-11-14 PROCEDURE — 85610 PROTHROMBIN TIME: CPT | Mod: ORL | Performed by: FAMILY MEDICINE

## 2022-11-14 PROCEDURE — 99310 SBSQ NF CARE HIGH MDM 45: CPT | Performed by: NURSE PRACTITIONER

## 2022-11-14 PROCEDURE — P9604 ONE-WAY ALLOW PRORATED TRIP: HCPCS | Mod: ORL | Performed by: FAMILY MEDICINE

## 2022-11-14 PROCEDURE — 36415 COLL VENOUS BLD VENIPUNCTURE: CPT | Mod: ORL | Performed by: FAMILY MEDICINE

## 2022-11-14 NOTE — LETTER
11/14/2022        RE: Carlos Ch  [address] [facility] [address]        [facility]  Chief Complaint   Patient presents with     [facility] F/U     [facility] Medical Record Number:  [id number]  Place of Service where encounter took place:  No question data found.  Code Status:  No CPR- Do NOT Intubate     HISTORY:      HPI:  Carlos Ch  is 77 year old (1945) undergoing physical and occupational therapy.He has a past medical history of atrial fibrillation on warfarin, autoimmune hepatitis and chronic idiopathic postoperative purpura on azathioprine, hypertension, BPH, and parkinsonism, who sustained a fall and found with pelvic fracture.  Orthopedics consulted, non surgical intervention   Has a history of orthostatic hypotension and tachybradycardia syndrome s/p pacemaker.     Today was seen to review vital signs, labs, routine visit and to establish care.  He denied chest pain shortness of breath cough congestion.  He denies constipation however no bowel movement in approximately 3 days.  Abdomen nontender he is passing flatus, he was started on bowel medications.  He rates his pain 2-3 with sitting and 10 out of 10 with ambulation but does find the meds to be helpful.  INR due today and currently in process.    ALLERGIES:Clopidogrel, Celecoxib, and Sulfa drugs    PAST MEDICAL HISTORY:   Past Medical History:   Diagnosis Date     Tachy-wesly syndrome (H)        PAST SURGICAL HISTORY:   has a past surgical history that includes Temporary Pacemaker Insertion.    FAMILY HISTORY: family history includes Coronary Artery Disease in his brother; Diabetes in his brother.    SOCIAL HISTORY:      ROS:  Constitutional: Negative for activity change, appetite change, fatigue and fever.   HENT: Negative for congestion.    Respiratory: Negative for cough, shortness of breath and wheezing.    Cardiovascular: Negative for chest pain and leg swelling.  Pacemaker  Gastrointestinal: Negative for  abdominal distention, abdominal pain, constipation, diarrhea and nausea.   Genitourinary: Negative for dysuria.   Musculoskeletal: positive  for arthralgia. Negative for back pain.   Skin: Negative for color change and wound.   Neurological: Negative for dizziness.   Psychiatric/Behavioral: Negative for agitation, behavioral problems and confusion.     Physical Exam:  Constitutional:       Appearance: Patient is well-developed.   HENT:      Head: Normocephalic.   Eyes:      Conjunctiva/sclera: Conjunctivae normal.   Neck:      Musculoskeletal: Normal range of motion.   Cardiovascular:      Rate and Rhythm: Normal rate and regular rhythm.      Heart sounds: Normal heart sounds. No murmur.   Pulmonary:      Effort: No respiratory distress.      Breath sounds: Normal breath sounds. No wheezing or rales.   Abdominal:      General: Bowel sounds are normal. There is no distension.      Palpations: Abdomen is soft.      Tenderness: There is no abdominal tenderness.   Musculoskeletal:       Normal range of motion.     Skin:General:        Skin is warm.   Neurological:         Mental Status: Patient is alert and oriented to person, place, and time.   Psychiatric:         Behavior: Behavior normal.     Vitals:BP (!) 142/74   Pulse 89   Temp 98.2  F (36.8  C)   Resp 18   SpO2 98%  and There is no height or weight on file to calculate BMI.    Lab/Diagnostic data:   Recent Results (from the past 240 hour(s))   Comprehensive metabolic panel    Collection Time: 11/10/22  9:04 PM   Result Value Ref Range    Sodium 144 136 - 145 mmol/L    Potassium 3.8 3.5 - 5.0 mmol/L    Chloride 107 98 - 107 mmol/L    Carbon Dioxide (CO2) 28 22 - 31 mmol/L    Anion Gap 9 5 - 18 mmol/L    Urea Nitrogen 18 8 - 28 mg/dL    Creatinine 0.83 0.70 - 1.30 mg/dL    Calcium 9.8 8.5 - 10.5 mg/dL    Glucose 98 70 - 125 mg/dL    Alkaline Phosphatase 47 45 - 120 U/L    AST 20 0 - 40 U/L    ALT 13 0 - 45 U/L    Protein Total 6.3 6.0 - 8.0 g/dL    Albumin 3.7  3.5 - 5.0 g/dL    Bilirubin Total 0.8 0.0 - 1.0 mg/dL    GFR Estimate 90 >60 mL/min/1.73m2   INR    Collection Time: 11/10/22  9:04 PM   Result Value Ref Range    INR 2.55 (H) 0.85 - 1.15   CBC with platelets and differential    Collection Time: 11/10/22  9:04 PM   Result Value Ref Range    WBC Count 6.6 4.0 - 11.0 10e3/uL    RBC Count 4.86 4.40 - 5.90 10e6/uL    Hemoglobin 15.4 13.3 - 17.7 g/dL    Hematocrit 45.6 40.0 - 53.0 %    MCV 94 78 - 100 fL    MCH 31.7 26.5 - 33.0 pg    MCHC 33.8 31.5 - 36.5 g/dL    RDW 13.0 10.0 - 15.0 %    Platelet Count 98 (L) 150 - 450 10e3/uL    % Neutrophils 68 %    % Lymphocytes 20 %    % Monocytes 10 %    % Eosinophils 1 %    % Basophils 0 %    % Immature Granulocytes 1 %    NRBCs per 100 WBC 0 <1 /100    Absolute Neutrophils 4.6 1.6 - 8.3 10e3/uL    Absolute Lymphocytes 1.3 0.8 - 5.3 10e3/uL    Absolute Monocytes 0.7 0.0 - 1.3 10e3/uL    Absolute Eosinophils 0.0 0.0 - 0.7 10e3/uL    Absolute Basophils 0.0 0.0 - 0.2 10e3/uL    Absolute Immature Granulocytes 0.0 <=0.4 10e3/uL    Absolute NRBCs 0.0 10e3/uL   Symptomatic; Unknown Influenza A/B & SARS-CoV2 (COVID-19) Virus PCR Multiplex Nasopharyngeal    Collection Time: 11/10/22  9:06 PM    Specimen: Nasopharyngeal; Swab   Result Value Ref Range    Influenza A PCR Negative Negative    Influenza B PCR Negative Negative    RSV PCR Negative Negative    SARS CoV2 PCR Negative Negative   Basic metabolic panel    Collection Time: 11/11/22  5:54 AM   Result Value Ref Range    Sodium 144 136 - 145 mmol/L    Potassium 3.8 3.5 - 5.0 mmol/L    Chloride 107 98 - 107 mmol/L    Carbon Dioxide (CO2) 27 22 - 31 mmol/L    Anion Gap 10 5 - 18 mmol/L    Urea Nitrogen 16 8 - 28 mg/dL    Creatinine 0.81 0.70 - 1.30 mg/dL    Calcium 9.5 8.5 - 10.5 mg/dL    Glucose 100 70 - 125 mg/dL    GFR Estimate >90 >60 mL/min/1.73m2   CBC with platelets    Collection Time: 11/11/22  5:54 AM   Result Value Ref Range    WBC Count 6.5 4.0 - 11.0 10e3/uL    RBC Count 4.81  4.40 - 5.90 10e6/uL    Hemoglobin 15.1 13.3 - 17.7 g/dL    Hematocrit 45.2 40.0 - 53.0 %    MCV 94 78 - 100 fL    MCH 31.4 26.5 - 33.0 pg    MCHC 33.4 31.5 - 36.5 g/dL    RDW 13.0 10.0 - 15.0 %    Platelet Count 99 (L) 150 - 450 10e3/uL   INR    Collection Time: 11/11/22  5:54 AM   Result Value Ref Range    INR 2.71 (H) 0.85 - 1.15   INR    Collection Time: 11/12/22  6:10 AM   Result Value Ref Range    INR 2.72 (H) 0.85 - 1.15   Extra Green Top (Lithium Heparin) Tube    Collection Time: 11/12/22  6:10 AM   Result Value Ref Range    Hold Specimen [facility]        MEDICATIONS:     Review of your medicines          Accurate as of November 13, 2022  7:18 PM. If you have any questions, ask your nurse or doctor.            CONTINUE these medicines which have NOT CHANGED      Dose / Directions   acetaminophen 325 MG tablet  Commonly known as: TYLENOL      Dose: 325-650 mg  Take 325-650 mg by mouth every 6 hours as needed for mild pain  Refills: 0     aspirin 81 MG EC tablet      Dose: 81 mg  Take 81 mg by mouth At Bedtime  Refills: 0     atorvastatin 40 MG tablet  Commonly known as: LIPITOR      Dose: 40 mg  Take 40 mg by mouth At Bedtime  Refills: 0     azaTHIOprine 50 MG tablet  Commonly known as: IMURAN      Dose: 50 mg  Take 50 mg by mouth daily  Refills: 0     calcium polycarbophil 625 MG tablet  Commonly known as: FIBERCON      Dose: 2 tablet  Take 2 tablets by mouth daily  Refills: 0     finasteride 5 MG tablet  Commonly known as: PROSCAR      Dose: 5 mg  Take 5 mg by mouth At Bedtime  Refills: 0     lisinopril 20 MG tablet  Commonly known as: ZESTRIL      Dose: 20 mg  Take 20 mg by mouth daily  Refills: 0     metoprolol succinate ER 25 MG 24 hr tablet  Commonly known as: TOPROL XL      Dose: 12.5 mg  Take 12.5 mg by mouth daily  Refills: 0     multivitamin w/minerals tablet      Dose: 1 tablet  Take 1 tablet by mouth daily  Refills: 0     ondansetron 4 MG ODT tab  Commonly known as: ZOFRAN ODT      Dose: 4 mg  Take 4 mg  by mouth every 8 hours as needed for nausea  Refills: 0     oxyCODONE 5 MG tablet  Commonly known as: ROXICODONE  Used for: Closed nondisplaced fracture of pelvis, unspecified part of pelvis, initial encounter (H)      Dose: 2.5-5 mg  Take 0.5-1 tablets (2.5-5 mg) by mouth every 6 hours as needed for severe pain  Quantity: 7 tablet  Refills: 0     terazosin 1 MG capsule  Commonly known as: HYTRIN      Dose: 1 mg  Take 1 mg by mouth At Bedtime  Refills: 0     warfarin ANTICOAGULANT 5 MG tablet  Commonly known as: COUMADIN      Dose: 5-7.5 mg  Take 5-7.5 mg by mouth See Admin Instructions 5 mg on Mondays and Thursdays; 7.5 mg all other days  Refills: 0            ASSESSMENT/PLAN  Encounter Diagnoses   Name Primary?     Syncope, unspecified syncope type Yes     Physical deconditioning      Pain management      Closed nondisplaced fracture of pelvis, unspecified part of pelvis, initial encounter (H)      Syncope denied dizziness    Physical deconditioning continue PT OT    Pain management as needed Tylenol and oxycodone    Right pelvis fracture follow-up with orthopedics, pain control    Hypertension on Metroprolol succinate and lisinopril    Atrial fibrillation on Coumadin monitor and adjust per INRs, continue metoprolol succinate    autoimmune hepatitis and chronic idiopathic postoperative purpura on azathioprine,    Electronically signed by: Harleen Angeles CNP        Sincerely,        Harleen Angeles CNP

## 2022-11-14 NOTE — PROGRESS NOTES
[facility]    Background: Transitional Care Management program identified per system criteria and reviewed by [facility] Resource [place] team for possible outreach.    Assessment: Upon chart review, CCR Team member will not proceed with patient outreach related to this episode of Transitional Care Management program due to reason below:    Non-[facility] TCU: Patient is not established within [facility] and CCRC team member noted patient discharged to TCU/ARU/LTACH.    Plan: Transitional Care Management episode addressed appropriately per reason noted above.      Deborah Bradley MA  [facility] Care Resource [place], [facility]    *Connected Care Resource Team does NOT follow patient ongoing. Referrals are identified based on internal discharge reports and the outreach is to ensure patient has an understanding of their discharge instructions.

## 2022-11-14 NOTE — PROGRESS NOTES
[facility] GERIATRIC SERVICES  Chief Complaint   Patient presents with     [facility] F/U     [place] Medical Record Number:  [id number]  Place of Service where encounter took place:  No question data found.  Code Status:  No CPR- Do NOT Intubate     HISTORY:      HPI:  Carlos Ch  is 77 year old (1945) undergoing physical and occupational therapy.He has a past medical history of atrial fibrillation on warfarin, autoimmune hepatitis and chronic idiopathic postoperative purpura on azathioprine, hypertension, BPH, and parkinsonism, who sustained a fall and found with pelvic fracture.  Orthopedics consulted, non surgical intervention   Has a history of orthostatic hypotension and tachybradycardia syndrome s/p pacemaker.     Today was seen to review vital signs, labs, routine visit and to establish care.  He denied chest pain shortness of breath cough congestion.  He denies constipation however no bowel movement in approximately 3 days.  Abdomen nontender he is passing flatus, he was started on bowel medications.  He rates his pain 2-3 with sitting and 10 out of 10 with ambulation but does find the meds to be helpful.  INR due today and currently in process.    ALLERGIES:Clopidogrel, Celecoxib, and Sulfa drugs    PAST MEDICAL HISTORY:   Past Medical History:   Diagnosis Date     Tachy-wesly syndrome (H)        PAST SURGICAL HISTORY:   has a past surgical history that includes Temporary Pacemaker Insertion.    FAMILY HISTORY: family history includes Coronary Artery Disease in his brother; Diabetes in his brother.    SOCIAL HISTORY:      ROS:  Constitutional: Negative for activity change, appetite change, fatigue and fever.   HENT: Negative for congestion.    Respiratory: Negative for cough, shortness of breath and wheezing.    Cardiovascular: Negative for chest pain and leg swelling.  Pacemaker  Gastrointestinal: Negative for abdominal distention, abdominal pain, constipation, diarrhea and nausea.   Genitourinary:  Negative for dysuria.   Musculoskeletal: positive  for arthralgia. Negative for back pain.   Skin: Negative for color change and wound.   Neurological: Negative for dizziness.   Psychiatric/Behavioral: Negative for agitation, behavioral problems and confusion.     Physical Exam:  Constitutional:       Appearance: Patient is well-developed.   HENT:      Head: Normocephalic.   Eyes:      Conjunctiva/sclera: Conjunctivae normal.   Neck:      Musculoskeletal: Normal range of motion.   Cardiovascular:      Rate and Rhythm: Normal rate and regular rhythm.      Heart sounds: Normal heart sounds. No murmur.   Pulmonary:      Effort: No respiratory distress.      Breath sounds: Normal breath sounds. No wheezing or rales.   Abdominal:      General: Bowel sounds are normal. There is no distension.      Palpations: Abdomen is soft.      Tenderness: There is no abdominal tenderness.   Musculoskeletal:       Normal range of motion.     Skin:General:        Skin is warm.   Neurological:         Mental Status: Patient is alert and oriented to person, place, and time.   Psychiatric:         Behavior: Behavior normal.     Vitals:BP (!) 142/74   Pulse 89   Temp 98.2  F (36.8  C)   Resp 18   SpO2 98%  and There is no height or weight on file to calculate BMI.    Lab/Diagnostic data:   Recent Results (from the past 240 hour(s))   Comprehensive metabolic panel    Collection Time: 11/10/22  9:04 PM   Result Value Ref Range    Sodium 144 136 - 145 mmol/L    Potassium 3.8 3.5 - 5.0 mmol/L    Chloride 107 98 - 107 mmol/L    Carbon Dioxide (CO2) 28 22 - 31 mmol/L    Anion Gap 9 5 - 18 mmol/L    Urea Nitrogen 18 8 - 28 mg/dL    Creatinine 0.83 0.70 - 1.30 mg/dL    Calcium 9.8 8.5 - 10.5 mg/dL    Glucose 98 70 - 125 mg/dL    Alkaline Phosphatase 47 45 - 120 U/L    AST 20 0 - 40 U/L    ALT 13 0 - 45 U/L    Protein Total 6.3 6.0 - 8.0 g/dL    Albumin 3.7 3.5 - 5.0 g/dL    Bilirubin Total 0.8 0.0 - 1.0 mg/dL    GFR Estimate 90 >60  mL/min/1.73m2   INR    Collection Time: 11/10/22  9:04 PM   Result Value Ref Range    INR 2.55 (H) 0.85 - 1.15   CBC with platelets and differential    Collection Time: 11/10/22  9:04 PM   Result Value Ref Range    WBC Count 6.6 4.0 - 11.0 10e3/uL    RBC Count 4.86 4.40 - 5.90 10e6/uL    Hemoglobin 15.4 13.3 - 17.7 g/dL    Hematocrit 45.6 40.0 - 53.0 %    MCV 94 78 - 100 fL    MCH 31.7 26.5 - 33.0 pg    MCHC 33.8 31.5 - 36.5 g/dL    RDW 13.0 10.0 - 15.0 %    Platelet Count 98 (L) 150 - 450 10e3/uL    % Neutrophils 68 %    % Lymphocytes 20 %    % Monocytes 10 %    % Eosinophils 1 %    % Basophils 0 %    % Immature Granulocytes 1 %    NRBCs per 100 WBC 0 <1 /100    Absolute Neutrophils 4.6 1.6 - 8.3 10e3/uL    Absolute Lymphocytes 1.3 0.8 - 5.3 10e3/uL    Absolute Monocytes 0.7 0.0 - 1.3 10e3/uL    Absolute Eosinophils 0.0 0.0 - 0.7 10e3/uL    Absolute Basophils 0.0 0.0 - 0.2 10e3/uL    Absolute Immature Granulocytes 0.0 <=0.4 10e3/uL    Absolute NRBCs 0.0 10e3/uL   Symptomatic; Unknown Influenza A/B & SARS-CoV2 (COVID-19) Virus PCR Multiplex Nasopharyngeal    Collection Time: 11/10/22  9:06 PM    Specimen: Nasopharyngeal; Swab   Result Value Ref Range    Influenza A PCR Negative Negative    Influenza B PCR Negative Negative    RSV PCR Negative Negative    SARS CoV2 PCR Negative Negative   Basic metabolic panel    Collection Time: 11/11/22  5:54 AM   Result Value Ref Range    Sodium 144 136 - 145 mmol/L    Potassium 3.8 3.5 - 5.0 mmol/L    Chloride 107 98 - 107 mmol/L    Carbon Dioxide (CO2) 27 22 - 31 mmol/L    Anion Gap 10 5 - 18 mmol/L    Urea Nitrogen 16 8 - 28 mg/dL    Creatinine 0.81 0.70 - 1.30 mg/dL    Calcium 9.5 8.5 - 10.5 mg/dL    Glucose 100 70 - 125 mg/dL    GFR Estimate >90 >60 mL/min/1.73m2   CBC with platelets    Collection Time: 11/11/22  5:54 AM   Result Value Ref Range    WBC Count 6.5 4.0 - 11.0 10e3/uL    RBC Count 4.81 4.40 - 5.90 10e6/uL    Hemoglobin 15.1 13.3 - 17.7 g/dL    Hematocrit 45.2  40.0 - 53.0 %    MCV 94 78 - 100 fL    MCH 31.4 26.5 - 33.0 pg    MCHC 33.4 31.5 - 36.5 g/dL    RDW 13.0 10.0 - 15.0 %    Platelet Count 99 (L) 150 - 450 10e3/uL   INR    Collection Time: 11/11/22  5:54 AM   Result Value Ref Range    INR 2.71 (H) 0.85 - 1.15   INR    Collection Time: 11/12/22  6:10 AM   Result Value Ref Range    INR 2.72 (H) 0.85 - 1.15   Extra Green Top (Lithium Heparin) Tube    Collection Time: 11/12/22  6:10 AM   Result Value Ref Range    Hold Specimen [facility]        MEDICATIONS:     Review of your medicines          Accurate as of November 13, 2022  7:18 PM. If you have any questions, ask your nurse or doctor.            CONTINUE these medicines which have NOT CHANGED      Dose / Directions   acetaminophen 325 MG tablet  Commonly known as: TYLENOL      Dose: 325-650 mg  Take 325-650 mg by mouth every 6 hours as needed for mild pain  Refills: 0     aspirin 81 MG EC tablet      Dose: 81 mg  Take 81 mg by mouth At Bedtime  Refills: 0     atorvastatin 40 MG tablet  Commonly known as: LIPITOR      Dose: 40 mg  Take 40 mg by mouth At Bedtime  Refills: 0     azaTHIOprine 50 MG tablet  Commonly known as: IMURAN      Dose: 50 mg  Take 50 mg by mouth daily  Refills: 0     calcium polycarbophil 625 MG tablet  Commonly known as: FIBERCON      Dose: 2 tablet  Take 2 tablets by mouth daily  Refills: 0     finasteride 5 MG tablet  Commonly known as: PROSCAR      Dose: 5 mg  Take 5 mg by mouth At Bedtime  Refills: 0     lisinopril 20 MG tablet  Commonly known as: ZESTRIL      Dose: 20 mg  Take 20 mg by mouth daily  Refills: 0     metoprolol succinate ER 25 MG 24 hr tablet  Commonly known as: TOPROL XL      Dose: 12.5 mg  Take 12.5 mg by mouth daily  Refills: 0     multivitamin w/minerals tablet      Dose: 1 tablet  Take 1 tablet by mouth daily  Refills: 0     ondansetron 4 MG ODT tab  Commonly known as: ZOFRAN ODT      Dose: 4 mg  Take 4 mg by mouth every 8 hours as needed for nausea  Refills: 0     oxyCODONE 5 MG  tablet  Commonly known as: ROXICODONE  Used for: Closed nondisplaced fracture of pelvis, unspecified part of pelvis, initial encounter (H)      Dose: 2.5-5 mg  Take 0.5-1 tablets (2.5-5 mg) by mouth every 6 hours as needed for severe pain  Quantity: 7 tablet  Refills: 0     terazosin 1 MG capsule  Commonly known as: HYTRIN      Dose: 1 mg  Take 1 mg by mouth At Bedtime  Refills: 0     warfarin ANTICOAGULANT 5 MG tablet  Commonly known as: COUMADIN      Dose: 5-7.5 mg  Take 5-7.5 mg by mouth See Admin Instructions 5 mg on Mondays and Thursdays; 7.5 mg all other days  Refills: 0            ASSESSMENT/PLAN  Encounter Diagnoses   Name Primary?     Syncope, unspecified syncope type Yes     Physical deconditioning      Pain management      Closed nondisplaced fracture of pelvis, unspecified part of pelvis, initial encounter (H)      Syncope denied dizziness    Physical deconditioning continue PT OT    Pain management as needed Tylenol and oxycodone    Right pelvis fracture follow-up with orthopedics, pain control    Hypertension on Metroprolol succinate and lisinopril    Atrial fibrillation on Coumadin monitor and adjust per INRs, continue metoprolol succinate    autoimmune hepatitis and chronic idiopathic postoperative purpura on azathioprine,    Electronically signed by: Harleen Angeles CNP

## 2022-11-15 ENCOUNTER — TRANSITIONAL CARE UNIT VISIT (OUTPATIENT)
Dept: GERIATRICS | Facility: CLINIC | Age: 77
End: 2022-11-15
Payer: MEDICARE

## 2022-11-15 ENCOUNTER — TELEPHONE (OUTPATIENT)
Dept: GERIATRICS | Facility: CLINIC | Age: 77
End: 2022-11-15

## 2022-11-15 ENCOUNTER — LAB REQUISITION (OUTPATIENT)
Dept: LAB | Facility: CLINIC | Age: 77
End: 2022-11-15
Payer: MEDICARE

## 2022-11-15 VITALS
DIASTOLIC BLOOD PRESSURE: 77 MMHG | RESPIRATION RATE: 20 BRPM | WEIGHT: 220 LBS | SYSTOLIC BLOOD PRESSURE: 117 MMHG | BODY MASS INDEX: 29.8 KG/M2 | TEMPERATURE: 97.7 F | HEIGHT: 72 IN | OXYGEN SATURATION: 95 % | HEART RATE: 92 BPM

## 2022-11-15 DIAGNOSIS — I48.0 PAF (PAROXYSMAL ATRIAL FIBRILLATION) (H): ICD-10-CM

## 2022-11-15 DIAGNOSIS — S32.9XXA CLOSED NONDISPLACED FRACTURE OF PELVIS, UNSPECIFIED PART OF PELVIS, INITIAL ENCOUNTER (H): ICD-10-CM

## 2022-11-15 DIAGNOSIS — R52 PAIN MANAGEMENT: ICD-10-CM

## 2022-11-15 DIAGNOSIS — R55 SYNCOPE, UNSPECIFIED SYNCOPE TYPE: Primary | ICD-10-CM

## 2022-11-15 DIAGNOSIS — D69.3 CHRONIC ITP (IDIOPATHIC THROMBOCYTOPENIC PURPURA) (H): ICD-10-CM

## 2022-11-15 DIAGNOSIS — I48.91 UNSPECIFIED ATRIAL FIBRILLATION (H): ICD-10-CM

## 2022-11-15 DIAGNOSIS — I72.3 ILIAC ARTERY ANEURYSM, RIGHT (H): ICD-10-CM

## 2022-11-15 DIAGNOSIS — R53.81 PHYSICAL DECONDITIONING: ICD-10-CM

## 2022-11-15 DIAGNOSIS — I49.5 TACHY-BRADY SYNDROME (H): ICD-10-CM

## 2022-11-15 DIAGNOSIS — K75.4 AUTOIMMUNE HEPATITIS (H): ICD-10-CM

## 2022-11-15 LAB
ANION GAP SERPL CALCULATED.3IONS-SCNC: 14 MMOL/L (ref 7–15)
BUN SERPL-MCNC: 23.6 MG/DL (ref 8–23)
CALCIUM SERPL-MCNC: 9.9 MG/DL (ref 8.8–10.2)
CHLORIDE SERPL-SCNC: 108 MMOL/L (ref 98–107)
CREAT SERPL-MCNC: 0.95 MG/DL (ref 0.67–1.17)
DEPRECATED HCO3 PLAS-SCNC: 22 MMOL/L (ref 22–29)
ERYTHROCYTE [DISTWIDTH] IN BLOOD BY AUTOMATED COUNT: 13.4 % (ref 10–15)
GFR SERPL CREATININE-BSD FRML MDRD: 82 ML/MIN/1.73M2
GLUCOSE SERPL-MCNC: 125 MG/DL (ref 70–99)
HCT VFR BLD AUTO: 45.2 % (ref 40–53)
HGB BLD-MCNC: 14.7 G/DL (ref 13.3–17.7)
MAGNESIUM SERPL-MCNC: 2.1 MG/DL (ref 1.7–2.3)
MCH RBC QN AUTO: 31.5 PG (ref 26.5–33)
MCHC RBC AUTO-ENTMCNC: 32.5 G/DL (ref 31.5–36.5)
MCV RBC AUTO: 97 FL (ref 78–100)
PLATELET # BLD AUTO: 139 10E3/UL (ref 150–450)
POTASSIUM SERPL-SCNC: 4.5 MMOL/L (ref 3.4–5.3)
RBC # BLD AUTO: 4.67 10E6/UL (ref 4.4–5.9)
SODIUM SERPL-SCNC: 144 MMOL/L (ref 136–145)
WBC # BLD AUTO: 6.4 10E3/UL (ref 4–11)

## 2022-11-15 PROCEDURE — P9604 ONE-WAY ALLOW PRORATED TRIP: HCPCS | Mod: ORL | Performed by: NURSE PRACTITIONER

## 2022-11-15 PROCEDURE — 80048 BASIC METABOLIC PNL TOTAL CA: CPT | Mod: ORL | Performed by: NURSE PRACTITIONER

## 2022-11-15 PROCEDURE — 36415 COLL VENOUS BLD VENIPUNCTURE: CPT | Mod: ORL | Performed by: NURSE PRACTITIONER

## 2022-11-15 PROCEDURE — 99306 1ST NF CARE HIGH MDM 50: CPT | Performed by: FAMILY MEDICINE

## 2022-11-15 PROCEDURE — 83735 ASSAY OF MAGNESIUM: CPT | Mod: ORL | Performed by: NURSE PRACTITIONER

## 2022-11-15 PROCEDURE — 85027 COMPLETE CBC AUTOMATED: CPT | Mod: ORL | Performed by: NURSE PRACTITIONER

## 2022-11-15 NOTE — LETTER
11/15/2022        RE: Carlos Ch  [address]  [facility] [address]        [facility]RIC [facility]       Patient Carlos Ch  MRN: [id number]        Reason for Visit     Chief Complaint   Patient presents with     RECHECK     INITIAL       Code Status     DNR / DNI    Assessment     History of a fall; mechanical  Pelvic fracture-nondisplaced right pubic body fracture with extension to right inferior pubic ramus  A. fib currently on warfarin  Autoimmune hepatitis  Chronic idiopathic purpura  Hypertension with history of orthostatic hypotension  Tachybradycardia syndrome status post pacemaker  Parkinson's  RT ILIAC ARTERY ANEURYSM  Generalized weakness  Acute on chronic constipation    Plan     Pt is admitted to TCU for strengthening and rehab.  EXAMINED IN PRESENCE OF WIFE  Patient presented post fall with pelvic fracture.  Orthopedic consulted  Conservative nonsurgical management  WBAT  Pain management optimized  Pt feels current regimen is working and does not want any scheduled pain meds  Outpatient follow-up with orthopedics as scheduled  Monitor BP  Recheck labs to monitor for platelets/ INR as ordered and pending  Continue with PT/OT-using a WC  Has a chronic balance issue per wife with shuffling gait  BIMS 11/15  Also reporting chronic constipation he is now on fiber senna and MiraLAX.  Advised prune juice.  Also advised to suppository versus enema if this does not resolve.  Care concerns reviewed with patient and his wife including duration of stay pain management follow-up with orthopedics as well as concerns about baseline gait instability.  Wife remains involved in his care.  Care plan was reviewed including medication review was done and patient does not want any scheduled pain pills   wife reports she will advocate for him for pain pills.  Total time spent is 45 minutes with more than 28 minutes spent face-to-face with the patient and his wife reviewing concerns as outlined in my  note above    History     Patient is a very pleasant 77 year old male who is admitted to TCU  Presented post fall.  He was noted to have a pelvic fracture and orthopedics was consulted.  Currently conservative treatment with pain management being optimized.  He has underlying history of orthostatic hypotension and blood pressures will be monitored.  He also has a history of tachybradycardia syndrome and has a pacemaker placed  Discharged to the TCU for strengthening and rehab  At baseline has gait instability and shuffling gait      Past Medical & Surgical History     PAST MEDICAL HISTORY:   Past Medical History:   Diagnosis Date     Tachy-wesly syndrome (H)       PAST SURGICAL HISTORY:   has a past surgical history that includes Temporary Pacemaker Insertion.      Past Social History     Reviewed,      Family History     Reviewed, and family history includes Coronary Artery Disease in his brother; Diabetes in his brother.    Medication List   Post Discharge Medication Reconciliation Status: Post Discharge Medication Reconciliation Status: discharge medications reconciled, continue medications without change.  Current Outpatient Medications   Medication     acetaminophen (TYLENOL) 325 MG tablet     aspirin 81 MG EC tablet     atorvastatin (LIPITOR) 40 MG tablet     azaTHIOprine (IMURAN) 50 MG tablet     calcium polycarbophil (FIBERCON) 625 MG tablet     finasteride (PROSCAR) 5 MG tablet     lisinopril (ZESTRIL) 20 MG tablet     metoprolol succinate ER (TOPROL-XL) 25 MG 24 hr tablet     multivitamin w/minerals (THERA-VIT-M) tablet     ondansetron (ZOFRAN ODT) 4 MG ODT tab     oxyCODONE (ROXICODONE) 5 MG tablet     terazosin (HYTRIN) 1 MG capsule     warfarin ANTICOAGULANT (COUMADIN) 5 MG tablet     No current facility-administered medications for this visit.          Allergies     Allergies   Allergen Reactions     Clopidogrel Rash     Celecoxib Rash     Sulfa Drugs Rash       Review of Systems   A comprehensive  review of 14 systems was done. Pertinent findings noted here and in history of present illness. All the rest negative.  Constitutional: Negative.  Negative for fever, chills, he has  activity change, appetite change and fatigue.   HENT: Negative for congestion and facial swelling.    Eyes: Negative for photophobia, redness and visual disturbance.   Respiratory: Negative for cough and chest tightness.    Cardiovascular: Negative for chest pain, palpitations and leg swelling.   Gastrointestinal: Negative for nausea, diarrhea,has  constipation, he is now on multiple stool softeners no blood in stool and abdominal distention.   Genitourinary: Negative.    Musculoskeletal: Reports as long as he sitting comfortably is not in much pain as soon as he does any movement or weight-bear his pain shoots up to 10 out of 10  Skin: Negative.    Neurological: Negative for dizziness, tremors, syncope, weakness, light-headedness and headaches.   Hematological: Does not bruise/bleed easily.   Psychiatric/Behavioral: Negative.  Affect is flat with limited recall      Physical Exam   /77   Pulse 92   Temp 97.7  F (36.5  C)   Resp 20   Ht 1.829 m (6')   Wt 99.8 kg (220 lb)   SpO2 95%   BMI 29.84 kg/m       Constitutional: Oriented to person, place, and time and appears well-developed.   HEENT:  Normocephalic and atraumatic.  Eyes: Conjunctivae and EOM are normal. Pupils are equal, round, and reactive to light. No discharge.  No scleral icterus. Nose normal. Mouth/Throat: Oropharynx is clear and moist. No oropharyngeal exudate.    NECK: Normal range of motion. Neck supple. No JVD present. No tracheal deviation present. No thyromegaly present.   CARDIOVASCULAR: Normal rate, regular rhythm and intact distal pulses.  Exam reveals no gallop and no friction rub.  Systolic murmur present.  PULMONARY: Effort normal and breath sounds normal. No respiratory distress.No Wheezing or rales.  ABDOMEN: Soft. Bowel sounds are normal. No  distension and no mass.  There is no tenderness. There is no rebound and no guarding. No HSM.  MUSCULOSKELETAL: Normal range of motion. No edema and no tenderness. Mild kyphosis, right pelvic and groin tenderness.  LYMPH NODES: Has no cervical, supraclavicular, axillary and groin adenopathy.   NEUROLOGICAL: Alert and oriented to person, place, and time. No cranial nerve deficit.  Normal muscle tone. Coordination normal. \has a shuffling gait and tends to drag his left lower extremity could not move his right leg at all  GENITOURINARY: Deferred exam.  SKIN: Skin is warm and dry. No rash noted. No erythema. No pallor.   EXTREMITIES: No cyanosis, no clubbing, no edema. No Deformity.  PSYCHIATRIC: Normal mood, affect and behavior.  Somewhat limited in his recall and answers      Lab Results     Recent Results (from the past 240 hour(s))   Comprehensive metabolic panel    Collection Time: 11/10/22  9:04 PM   Result Value Ref Range    Sodium 144 136 - 145 mmol/L    Potassium 3.8 3.5 - 5.0 mmol/L    Chloride 107 98 - 107 mmol/L    Carbon Dioxide (CO2) 28 22 - 31 mmol/L    Anion Gap 9 5 - 18 mmol/L    Urea Nitrogen 18 8 - 28 mg/dL    Creatinine 0.83 0.70 - 1.30 mg/dL    Calcium 9.8 8.5 - 10.5 mg/dL    Glucose 98 70 - 125 mg/dL    Alkaline Phosphatase 47 45 - 120 U/L    AST 20 0 - 40 U/L    ALT 13 0 - 45 U/L    Protein Total 6.3 6.0 - 8.0 g/dL    Albumin 3.7 3.5 - 5.0 g/dL    Bilirubin Total 0.8 0.0 - 1.0 mg/dL    GFR Estimate 90 >60 mL/min/1.73m2   INR    Collection Time: 11/10/22  9:04 PM   Result Value Ref Range    INR 2.55 (H) 0.85 - 1.15   CBC with platelets and differential    Collection Time: 11/10/22  9:04 PM   Result Value Ref Range    WBC Count 6.6 4.0 - 11.0 10e3/uL    RBC Count 4.86 4.40 - 5.90 10e6/uL    Hemoglobin 15.4 13.3 - 17.7 g/dL    Hematocrit 45.6 40.0 - 53.0 %    MCV 94 78 - 100 fL    MCH 31.7 26.5 - 33.0 pg    MCHC 33.8 31.5 - 36.5 g/dL    RDW 13.0 10.0 - 15.0 %    Platelet Count 98 (L) 150 - 450  10e3/uL    % Neutrophils 68 %    % Lymphocytes 20 %    % Monocytes 10 %    % Eosinophils 1 %    % Basophils 0 %    % Immature Granulocytes 1 %    NRBCs per 100 WBC 0 <1 /100    Absolute Neutrophils 4.6 1.6 - 8.3 10e3/uL    Absolute Lymphocytes 1.3 0.8 - 5.3 10e3/uL    Absolute Monocytes 0.7 0.0 - 1.3 10e3/uL    Absolute Eosinophils 0.0 0.0 - 0.7 10e3/uL    Absolute Basophils 0.0 0.0 - 0.2 10e3/uL    Absolute Immature Granulocytes 0.0 <=0.4 10e3/uL    Absolute NRBCs 0.0 10e3/uL   Symptomatic; Unknown Influenza A/B & SARS-CoV2 (COVID-19) Virus PCR Multiplex Nasopharyngeal    Collection Time: 11/10/22  9:06 PM    Specimen: Nasopharyngeal; Swab   Result Value Ref Range    Influenza A PCR Negative Negative    Influenza B PCR Negative Negative    RSV PCR Negative Negative    SARS CoV2 PCR Negative Negative   Basic metabolic panel    Collection Time: 11/11/22  5:54 AM   Result Value Ref Range    Sodium 144 136 - 145 mmol/L    Potassium 3.8 3.5 - 5.0 mmol/L    Chloride 107 98 - 107 mmol/L    Carbon Dioxide (CO2) 27 22 - 31 mmol/L    Anion Gap 10 5 - 18 mmol/L    Urea Nitrogen 16 8 - 28 mg/dL    Creatinine 0.81 0.70 - 1.30 mg/dL    Calcium 9.5 8.5 - 10.5 mg/dL    Glucose 100 70 - 125 mg/dL    GFR Estimate >90 >60 mL/min/1.73m2   CBC with platelets    Collection Time: 11/11/22  5:54 AM   Result Value Ref Range    WBC Count 6.5 4.0 - 11.0 10e3/uL    RBC Count 4.81 4.40 - 5.90 10e6/uL    Hemoglobin 15.1 13.3 - 17.7 g/dL    Hematocrit 45.2 40.0 - 53.0 %    MCV 94 78 - 100 fL    MCH 31.4 26.5 - 33.0 pg    MCHC 33.4 31.5 - 36.5 g/dL    RDW 13.0 10.0 - 15.0 %    Platelet Count 99 (L) 150 - 450 10e3/uL   INR    Collection Time: 11/11/22  5:54 AM   Result Value Ref Range    INR 2.71 (H) 0.85 - 1.15   INR    Collection Time: 11/12/22  6:10 AM   Result Value Ref Range    INR 2.72 (H) 0.85 - 1.15   Extra Green Top (Lithium Heparin) Tube    Collection Time: 11/12/22  6:10 AM   Result Value Ref Range    Hold Specimen JIC    INR     Collection Time: 11/14/22  8:55 AM   Result Value Ref Range    INR 3.30 (H) 0.85 - 1.15             Imaging Results     CT Pelvis Bone wo Contrast    Result Date: 11/10/2022  EXAM: CT PELVIS BONE WO CONTRAST LOCATION: [facility] DATE/TIME: 11/10/2022 5:14 PM INDICATION: Hip pain without trauma or other complicating feature; no hip x-ray result available. COMPARISON: X-ray evaluation 11/8/2022. TECHNIQUE: CT scan of the pelvis was performed without IV contrast. Multiplanar reformats were obtained. Dose reduction techniques were used. CONTRAST: None. FINDINGS: Both hips are negative for fracture or CT evidence of avascular necrosis. There is degenerative change at both hips with superior joint space narrowing and slight osteophytic spurring. Findings are fairly symmetric. There is a fracture of the right pubic body. This extends a short distance into the right inferior pubic ramus. No disruption of the symphysis pubis. No additional fractures are identified. 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 Degenerative change at both SI joints. There is soft tissue stranding surrounding the right pubic body fracture, but no significant organized hematoma. The intrapelvic contents are otherwise negative.     IMPRESSION: 1.  Both hips are negative for fracture or CT evidence of avascular necrosis. 2.  There is a nondisplaced fracture of the right pubic body which extends a short distance into the right inferior pubic ramus. No disruption of the symphysis pubis. 3.  No additional fractures are identified. 4.  There is some soft tissue stranding about the fracture, but no evidence for organized hematoma. 5.  There is some additional edema lateral to the right hip which could represent direct impact injury/contusion, but again no evidence for hematoma. 6.  Advanced degenerative change at the SI joints bilaterally with near ankylosis on the right.           Electronically signed by    Alison Villarreal MD                              Sincerely,        JEEVAN Kwan

## 2022-11-15 NOTE — TELEPHONE ENCOUNTER
[facility] Triage Nurse Telephone Encounter    Provider: Alison Villarreal MD  Facility: [facility]  Facility Type:  TCU      Call Back Number: [phone]    Allergies:    Allergies   Allergen Reactions     Clopidogrel Rash     Celecoxib Rash     Sulfa Drugs Rash        Reason for call: New orders per MD.      Verbal Order/Direction given by Provider: Hold Warfarin on 11/15/22.  Next INR 11/16/22.  Cancel 11/18/22.      Provider giving Order:  Alison Villarreal MD    Verbal Order given to: Yvonne Dumont RN

## 2022-11-15 NOTE — PROGRESS NOTES
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  EXAMINED IN PRESENCE OF WIFE  Patient presented post fall with pelvic fracture.  Orthopedic consulted  Conservative nonsurgical management  WBAT  Pain management optimized  Pt feels current regimen is working and does not want any scheduled pain meds  Outpatient follow-up with orthopedics as scheduled  Monitor BP  Recheck labs to monitor for platelets/ INR as ordered and pending  Continue with PT/OT-using a WC  Has a chronic balance issue per wife with shuffling gait  BIMS 11/15  Also reporting chronic constipation he is now on fiber senna and MiraLAX.  Advised prune juice.  Also advised to suppository versus enema if this does not resolve.  Care concerns reviewed with patient and his wife including duration of stay pain management follow-up with orthopedics as well as concerns about baseline gait instability.  Wife remains involved in his care.  Care plan was reviewed including medication review was done and patient does not want any scheduled pain pills   wife reports she will advocate for him for pain pills.  Total time spent is 45 minutes with more than 28 minutes spent face-to-face with the patient and his wife reviewing concerns as outlined in my note above    History     Patient is a very pleasant 77 year old male who is admitted to  TCU  Presented post fall.  He was noted to have a pelvic fracture and orthopedics was consulted.  Currently conservative treatment with pain management being optimized.  He has underlying history of orthostatic hypotension and blood pressures will be monitored.  He also has a history of tachybradycardia syndrome and has a pacemaker placed  Discharged to the TCU for strengthening and rehab  At baseline has gait instability and shuffling gait      Past Medical & Surgical History     PAST MEDICAL HISTORY:   Past Medical History:   Diagnosis Date     Tachy-wesly syndrome (H)       PAST SURGICAL HISTORY:   has a past surgical history that includes Temporary Pacemaker Insertion.      Past Social History     Reviewed,      Family History     Reviewed, and family history includes Coronary Artery Disease in his brother; Diabetes in his brother.    Medication List   Post Discharge Medication Reconciliation Status: Post Discharge Medication Reconciliation Status: discharge medications reconciled, continue medications without change.  Current Outpatient Medications   Medication     acetaminophen (TYLENOL) 325 MG tablet     aspirin 81 MG EC tablet     atorvastatin (LIPITOR) 40 MG tablet     azaTHIOprine (IMURAN) 50 MG tablet     calcium polycarbophil (FIBERCON) 625 MG tablet     finasteride (PROSCAR) 5 MG tablet     lisinopril (ZESTRIL) 20 MG tablet     metoprolol succinate ER (TOPROL-XL) 25 MG 24 hr tablet     multivitamin w/minerals (THERA-VIT-M) tablet     ondansetron (ZOFRAN ODT) 4 MG ODT tab     oxyCODONE (ROXICODONE) 5 MG tablet     terazosin (HYTRIN) 1 MG capsule     warfarin ANTICOAGULANT (COUMADIN) 5 MG tablet     No current facility-administered medications for this visit.          Allergies     Allergies   Allergen Reactions     Clopidogrel Rash     Celecoxib Rash     Sulfa Drugs Rash       Review of Systems   A comprehensive review of 14 systems was done. Pertinent findings noted here and in history of present  illness. All the rest negative.  Constitutional: Negative.  Negative for fever, chills, he has  activity change, appetite change and fatigue.   HENT: Negative for congestion and facial swelling.    Eyes: Negative for photophobia, redness and visual disturbance.   Respiratory: Negative for cough and chest tightness.    Cardiovascular: Negative for chest pain, palpitations and leg swelling.   Gastrointestinal: Negative for nausea, diarrhea,has  constipation, he is now on multiple stool softeners no blood in stool and abdominal distention.   Genitourinary: Negative.    Musculoskeletal: Reports as long as he sitting comfortably is not in much pain as soon as he does any movement or weight-bear his pain shoots up to 10 out of 10  Skin: Negative.    Neurological: Negative for dizziness, tremors, syncope, weakness, light-headedness and headaches.   Hematological: Does not bruise/bleed easily.   Psychiatric/Behavioral: Negative.  Affect is flat with limited recall      Physical Exam   /77   Pulse 92   Temp 97.7  F (36.5  C)   Resp 20   Ht 1.829 m (6')   Wt 99.8 kg (220 lb)   SpO2 95%   BMI 29.84 kg/m       Constitutional: Oriented to person, place, and time and appears well-developed.   HEENT:  Normocephalic and atraumatic.  Eyes: Conjunctivae and EOM are normal. Pupils are equal, round, and reactive to light. No discharge.  No scleral icterus. Nose normal. Mouth/Throat: Oropharynx is clear and moist. No oropharyngeal exudate.    NECK: Normal range of motion. Neck supple. No JVD present. No tracheal deviation present. No thyromegaly present.   CARDIOVASCULAR: Normal rate, regular rhythm and intact distal pulses.  Exam reveals no gallop and no friction rub.  Systolic murmur present.  PULMONARY: Effort normal and breath sounds normal. No respiratory distress.No Wheezing or rales.  ABDOMEN: Soft. Bowel sounds are normal. No distension and no mass.  There is no tenderness. There is no rebound and no guarding. No  HSM.  MUSCULOSKELETAL: Normal range of motion. No edema and no tenderness. Mild kyphosis, right pelvic and groin tenderness.  LYMPH NODES: Has no cervical, supraclavicular, axillary and groin adenopathy.   NEUROLOGICAL: Alert and oriented to person, place, and time. No cranial nerve deficit.  Normal muscle tone. Coordination normal. \  He has a shuffling gait and tends to drag his left lower extremity could not move his right leg at all  GENITOURINARY: Deferred exam.  SKIN: Skin is warm and dry. No rash noted. No erythema. No pallor.   EXTREMITIES: No cyanosis, no clubbing, no edema. No Deformity.  PSYCHIATRIC: Normal mood, affect and behavior.  Somewhat limited in his recall and answers      Lab Results     Recent Results (from the past 240 hour(s))   Comprehensive metabolic panel    Collection Time: 11/10/22  9:04 PM   Result Value Ref Range    Sodium 144 136 - 145 mmol/L    Potassium 3.8 3.5 - 5.0 mmol/L    Chloride 107 98 - 107 mmol/L    Carbon Dioxide (CO2) 28 22 - 31 mmol/L    Anion Gap 9 5 - 18 mmol/L    Urea Nitrogen 18 8 - 28 mg/dL    Creatinine 0.83 0.70 - 1.30 mg/dL    Calcium 9.8 8.5 - 10.5 mg/dL    Glucose 98 70 - 125 mg/dL    Alkaline Phosphatase 47 45 - 120 U/L    AST 20 0 - 40 U/L    ALT 13 0 - 45 U/L    Protein Total 6.3 6.0 - 8.0 g/dL    Albumin 3.7 3.5 - 5.0 g/dL    Bilirubin Total 0.8 0.0 - 1.0 mg/dL    GFR Estimate 90 >60 mL/min/1.73m2   INR    Collection Time: 11/10/22  9:04 PM   Result Value Ref Range    INR 2.55 (H) 0.85 - 1.15   CBC with platelets and differential    Collection Time: 11/10/22  9:04 PM   Result Value Ref Range    WBC Count 6.6 4.0 - 11.0 10e3/uL    RBC Count 4.86 4.40 - 5.90 10e6/uL    Hemoglobin 15.4 13.3 - 17.7 g/dL    Hematocrit 45.6 40.0 - 53.0 %    MCV 94 78 - 100 fL    MCH 31.7 26.5 - 33.0 pg    MCHC 33.8 31.5 - 36.5 g/dL    RDW 13.0 10.0 - 15.0 %    Platelet Count 98 (L) 150 - 450 10e3/uL    % Neutrophils 68 %    % Lymphocytes 20 %    % Monocytes 10 %    % Eosinophils  1 %    % Basophils 0 %    % Immature Granulocytes 1 %    NRBCs per 100 WBC 0 <1 /100    Absolute Neutrophils 4.6 1.6 - 8.3 10e3/uL    Absolute Lymphocytes 1.3 0.8 - 5.3 10e3/uL    Absolute Monocytes 0.7 0.0 - 1.3 10e3/uL    Absolute Eosinophils 0.0 0.0 - 0.7 10e3/uL    Absolute Basophils 0.0 0.0 - 0.2 10e3/uL    Absolute Immature Granulocytes 0.0 <=0.4 10e3/uL    Absolute NRBCs 0.0 10e3/uL   Symptomatic; Unknown Influenza A/B & SARS-CoV2 (COVID-19) Virus PCR Multiplex Nasopharyngeal    Collection Time: 11/10/22  9:06 PM    Specimen: Nasopharyngeal; Swab   Result Value Ref Range    Influenza A PCR Negative Negative    Influenza B PCR Negative Negative    RSV PCR Negative Negative    SARS CoV2 PCR Negative Negative   Basic metabolic panel    Collection Time: 11/11/22  5:54 AM   Result Value Ref Range    Sodium 144 136 - 145 mmol/L    Potassium 3.8 3.5 - 5.0 mmol/L    Chloride 107 98 - 107 mmol/L    Carbon Dioxide (CO2) 27 22 - 31 mmol/L    Anion Gap 10 5 - 18 mmol/L    Urea Nitrogen 16 8 - 28 mg/dL    Creatinine 0.81 0.70 - 1.30 mg/dL    Calcium 9.5 8.5 - 10.5 mg/dL    Glucose 100 70 - 125 mg/dL    GFR Estimate >90 >60 mL/min/1.73m2   CBC with platelets    Collection Time: 11/11/22  5:54 AM   Result Value Ref Range    WBC Count 6.5 4.0 - 11.0 10e3/uL    RBC Count 4.81 4.40 - 5.90 10e6/uL    Hemoglobin 15.1 13.3 - 17.7 g/dL    Hematocrit 45.2 40.0 - 53.0 %    MCV 94 78 - 100 fL    MCH 31.4 26.5 - 33.0 pg    MCHC 33.4 31.5 - 36.5 g/dL    RDW 13.0 10.0 - 15.0 %    Platelet Count 99 (L) 150 - 450 10e3/uL   INR    Collection Time: 11/11/22  5:54 AM   Result Value Ref Range    INR 2.71 (H) 0.85 - 1.15   INR    Collection Time: 11/12/22  6:10 AM   Result Value Ref Range    INR 2.72 (H) 0.85 - 1.15   Extra Green Top (Lithium Heparin) Tube    Collection Time: 11/12/22  6:10 AM   Result Value Ref Range    Hold Specimen JI    INR    Collection Time: 11/14/22  8:55 AM   Result Value Ref Range    INR 3.30 (H) 0.85 - 1.15              Imaging Results     CT Pelvis Bone wo Contrast    Result Date: 11/10/2022  EXAM: CT PELVIS BONE WO CONTRAST LOCATION: [facility] DATE/TIME: 11/10/2022 5:14 PM INDICATION: Hip pain without trauma or other complicating feature; no hip x-ray result available. COMPARISON: X-ray evaluation 11/8/2022. TECHNIQUE: CT scan of the pelvis was performed without IV contrast. Multiplanar reformats were obtained. Dose reduction techniques were used. CONTRAST: None. FINDINGS: Both hips are negative for fracture or CT evidence of avascular necrosis. There is degenerative change at both hips with superior joint space narrowing and slight osteophytic spurring. Findings are fairly symmetric. There is a fracture of the right pubic body. This extends a short distance into the right inferior pubic ramus. No disruption of the symphysis pubis. No additional fractures are identified. 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 Degenerative change at both SI joints. There is soft tissue stranding surrounding the right pubic body fracture, but no significant organized hematoma. The intrapelvic contents are otherwise negative.     IMPRESSION: 1.  Both hips are negative for fracture or CT evidence of avascular necrosis. 2.  There is a nondisplaced fracture of the right pubic body which extends a short distance into the right inferior pubic ramus. No disruption of the symphysis pubis. 3.  No additional fractures are identified. 4.  There is some soft tissue stranding about the fracture, but no evidence for organized hematoma. 5.  There is some additional edema lateral to the right hip which could represent direct impact injury/contusion, but again no evidence for hematoma. 6.  Advanced degenerative change at the SI joints bilaterally with near ankylosis on the right.           Electronically signed by    Alison Villarreal MD

## 2022-11-16 ENCOUNTER — TELEPHONE (OUTPATIENT)
Dept: GERIATRICS | Facility: CLINIC | Age: 77
End: 2022-11-16

## 2022-11-16 LAB — INR PPP: 2.78 (ref 0.85–1.15)

## 2022-11-16 PROCEDURE — P9604 ONE-WAY ALLOW PRORATED TRIP: HCPCS | Mod: ORL | Performed by: FAMILY MEDICINE

## 2022-11-16 PROCEDURE — 36415 COLL VENOUS BLD VENIPUNCTURE: CPT | Mod: ORL | Performed by: FAMILY MEDICINE

## 2022-11-16 PROCEDURE — 85610 PROTHROMBIN TIME: CPT | Mod: ORL | Performed by: FAMILY MEDICINE

## 2022-11-16 NOTE — TELEPHONE ENCOUNTER
[facility] Geriatrics Triage Nurse INR     Provider: THERON Grant  Facility: [facility]   Facility Type:  TCU    Caller: Joy  Call Back Number: [phone]  Reason for call: INR  Diagnosis/Goal: A. Fib    Todays INR: 2.78  Last INR 11/14 3.30(7mg on 11/14, hold on 11/15), 11/12 2.72(7mg on 11/12 and 7.5mg on 11/13).  Home dose:  5mg Mon and Thurs and 7.5mg AOD.      Heparin/Lovenox:  No  Currently on ABX?: No  Other interacting medication:  OtherASA  Missed or refused doses: No    Verbal Order/Direction given by Provider: Warfarin 5mg M-W-F and and 7.5mg all other days.  Next INR 11/21/22.      Provider Giving Order:  THERON Grant    Verbal Order given to: Joy Dumont RN

## 2022-11-20 ENCOUNTER — LAB REQUISITION (OUTPATIENT)
Dept: LAB | Facility: CLINIC | Age: 77
End: 2022-11-20
Payer: MEDICARE

## 2022-11-20 DIAGNOSIS — I48.91 UNSPECIFIED ATRIAL FIBRILLATION (H): ICD-10-CM

## 2022-11-21 ENCOUNTER — TELEPHONE (OUTPATIENT)
Dept: GERIATRICS | Facility: CLINIC | Age: 77
End: 2022-11-21

## 2022-11-21 LAB — INR PPP: 2.65 (ref 0.85–1.15)

## 2022-11-21 PROCEDURE — 85610 PROTHROMBIN TIME: CPT | Performed by: NURSE PRACTITIONER

## 2022-11-21 PROCEDURE — P9604 ONE-WAY ALLOW PRORATED TRIP: HCPCS | Performed by: NURSE PRACTITIONER

## 2022-11-21 PROCEDURE — 36415 COLL VENOUS BLD VENIPUNCTURE: CPT | Performed by: NURSE PRACTITIONER

## 2022-11-23 ENCOUNTER — DISCHARGE SUMMARY NURSING HOME (OUTPATIENT)
Dept: GERIATRICS | Facility: CLINIC | Age: 77
End: 2022-11-23
Payer: MEDICARE

## 2022-11-23 VITALS
HEIGHT: 72 IN | HEART RATE: 97 BPM | SYSTOLIC BLOOD PRESSURE: 102 MMHG | WEIGHT: 215 LBS | RESPIRATION RATE: 18 BRPM | OXYGEN SATURATION: 99 % | BODY MASS INDEX: 29.12 KG/M2 | TEMPERATURE: 96.5 F | DIASTOLIC BLOOD PRESSURE: 72 MMHG

## 2022-11-23 DIAGNOSIS — R52 PAIN MANAGEMENT: ICD-10-CM

## 2022-11-23 DIAGNOSIS — S32.9XXA CLOSED NONDISPLACED FRACTURE OF PELVIS, UNSPECIFIED PART OF PELVIS, INITIAL ENCOUNTER (H): Primary | ICD-10-CM

## 2022-11-23 DIAGNOSIS — R53.81 PHYSICAL DECONDITIONING: ICD-10-CM

## 2022-11-23 PROCEDURE — 99316 NF DSCHRG MGMT 30 MIN+: CPT | Performed by: NURSE PRACTITIONER

## 2022-11-23 RX ORDER — ONDANSETRON 4 MG/1
TABLET, FILM COATED ORAL EVERY 8 HOURS PRN
COMMUNITY

## 2022-11-23 RX ORDER — AMOXICILLIN 250 MG
1 CAPSULE ORAL 2 TIMES DAILY PRN
COMMUNITY

## 2022-11-23 NOTE — PROGRESS NOTES
A face-to-face for discharge.  He will discharge to home on 11/26/2022 with current medications and treatments.  He will have [facility] GERIATRIC SERVICES  Chief Complaint   Patient presents with     Discharge Summary Nursing Home     [place] Medical Record Number:  [id number]  Place of Service where encounter took place:  [facility] (SNF) [[id number]]  Code Status:  No CPR- Do NOT Intubate     HISTORY:      HPI:  Carlos Ch  is 77 year old (1945) undergoing physical and occupational therapy.He has a past medical history of atrial fibrillation on warfarin, autoimmune hepatitis and chronic idiopathic postoperative purpura on azathioprine, hypertension, BPH, and parkinsonism, who sustained a fall and found with pelvic fracture.  Orthopedics consulted, non surgical intervention   Has a history of orthostatic hypotension and tachybradycardia syndrome s/p pacemaker.     Today was seen to review vital signs, labs, routine visit and  a face-to-face for discharge.  He will discharge to home on 11/26/2022 with current medications and treatments.  He will have home care services PT OT speech therapy and RN.  He denied chest pain shortness of breath cough congestion.  He is now moving his bowels.  He is on Coumadin and next INR will be due on 11/28/2022.  His pain is controlled with current medications.    ALLERGIES:Clopidogrel, Celecoxib, and Sulfa drugs    PAST MEDICAL HISTORY:   Past Medical History:   Diagnosis Date     Tachy-wesly syndrome (H)        PAST SURGICAL HISTORY:   has a past surgical history that includes Temporary Pacemaker Insertion.    FAMILY HISTORY: family history includes Coronary Artery Disease in his brother; Diabetes in his brother.    SOCIAL HISTORY:      ROS:  Constitutional: Negative for activity change, appetite change, fatigue and fever.   HENT: Negative for congestion.    Respiratory: Negative for cough, shortness of breath and wheezing.    Cardiovascular: Negative for chest pain  and leg swelling.  Pacemaker  Gastrointestinal: Negative for abdominal distention, abdominal pain, constipation, diarrhea and nausea.   Genitourinary: Negative for dysuria.   Musculoskeletal: positive  for arthralgia. Negative for back pain.   Skin: Negative for color change and wound.   Neurological: Negative for dizziness.   Psychiatric/Behavioral: Negative for agitation, behavioral problems and confusion.     Physical Exam:  Constitutional:       Appearance: Patient is well-developed.   HENT:      Head: Normocephalic.   Eyes:      Conjunctiva/sclera: Conjunctivae normal.   Neck:      Musculoskeletal: Normal range of motion.   Cardiovascular:      Rate and Rhythm: Normal rate and regular rhythm.      Heart sounds: Normal heart sounds. No murmur.   Pulmonary:      Effort: No respiratory distress.      Breath sounds: Normal breath sounds. No wheezing or rales.   Abdominal:      General: Bowel sounds are normal. There is no distension.      Palpations: Abdomen is soft.      Tenderness: There is no abdominal tenderness.   Musculoskeletal:       Normal range of motion.     Skin:General:        Skin is warm.   Neurological:         Mental Status: Patient is alert and oriented to person, place, and time.   Psychiatric:         Behavior: Behavior normal.     Vitals:/72   Pulse 97   Temp (!) 96.5  F (35.8  C)   Resp 18   Ht 1.829 m (6')   Wt 97.5 kg (215 lb)   SpO2 99%   BMI 29.16 kg/m   and Body mass index is 29.16 kg/m .    Lab/Diagnostic data:   Recent Results (from the past 240 hour(s))   INR    Collection Time: 11/14/22  8:55 AM   Result Value Ref Range    INR 3.30 (H) 0.85 - 1.15   Basic metabolic panel    Collection Time: 11/15/22 11:50 AM   Result Value Ref Range    Sodium 144 136 - 145 mmol/L    Potassium 4.5 3.4 - 5.3 mmol/L    Chloride 108 (H) 98 - 107 mmol/L    Carbon Dioxide (CO2) 22 22 - 29 mmol/L    Anion Gap 14 7 - 15 mmol/L    Urea Nitrogen 23.6 (H) 8.0 - 23.0 mg/dL    Creatinine 0.95 0.67 -  1.17 mg/dL    Calcium 9.9 8.8 - 10.2 mg/dL    Glucose 125 (H) 70 - 99 mg/dL    GFR Estimate 82 >60 mL/min/1.73m2   CBC with platelets    Collection Time: 11/15/22 11:50 AM   Result Value Ref Range    WBC Count 6.4 4.0 - 11.0 10e3/uL    RBC Count 4.67 4.40 - 5.90 10e6/uL    Hemoglobin 14.7 13.3 - 17.7 g/dL    Hematocrit 45.2 40.0 - 53.0 %    MCV 97 78 - 100 fL    MCH 31.5 26.5 - 33.0 pg    MCHC 32.5 31.5 - 36.5 g/dL    RDW 13.4 10.0 - 15.0 %    Platelet Count 139 (L) 150 - 450 10e3/uL   Magnesium    Collection Time: 11/15/22 11:50 AM   Result Value Ref Range    Magnesium 2.1 1.7 - 2.3 mg/dL   INR    Collection Time: 11/16/22  8:05 AM   Result Value Ref Range    INR 2.78 (H) 0.85 - 1.15   INR    Collection Time: 11/21/22  7:05 AM   Result Value Ref Range    INR 2.65 (H) 0.85 - 1.15       MEDICATIONS:     Review of your medicines          Accurate as of November 23, 2022  3:12 PM. If you have any questions, ask your nurse or doctor.            CONTINUE these medicines which may have CHANGED, or have new prescriptions. If we are uncertain of the size of tablets/capsules you have at home, strength may be listed as something that might have changed.      Dose / Directions   oxyCODONE 5 MG tablet  Commonly known as: ROXICODONE  This may have changed: how much to take  Used for: Closed nondisplaced fracture of pelvis, unspecified part of pelvis, initial encounter (H)      Dose: 2.5-5 mg  Take 0.5-1 tablets (2.5-5 mg) by mouth every 6 hours as needed for severe pain  Quantity: 7 tablet  Refills: 0        CONTINUE these medicines which have NOT CHANGED      Dose / Directions   acetaminophen 325 MG tablet  Commonly known as: TYLENOL      Dose: 325-650 mg  Take 325-650 mg by mouth every 6 hours as needed for mild pain  Refills: 0     aspirin 81 MG EC tablet      Dose: 81 mg  Take 81 mg by mouth At Bedtime  Refills: 0     atorvastatin 40 MG tablet  Commonly known as: LIPITOR      Dose: 40 mg  Take 40 mg by mouth At  Bedtime  Refills: 0     azaTHIOprine 50 MG tablet  Commonly known as: IMURAN      Dose: 50 mg  Take 50 mg by mouth daily  Refills: 0     calcium polycarbophil 625 MG tablet  Commonly known as: FIBERCON      Dose: 2 tablet  Take 2 tablets by mouth daily  Refills: 0     finasteride 5 MG tablet  Commonly known as: PROSCAR      Dose: 5 mg  Take 5 mg by mouth At Bedtime  Refills: 0     lisinopril 20 MG tablet  Commonly known as: ZESTRIL      Dose: 20 mg  Take 20 mg by mouth daily  Refills: 0     metoprolol succinate ER 25 MG 24 hr tablet  Commonly known as: TOPROL XL      Dose: 12.5 mg  Take 12.5 mg by mouth daily  Refills: 0     multivitamin w/minerals tablet      Dose: 1 tablet  Take 1 tablet by mouth daily  Refills: 0     ondansetron 4 MG tablet  Commonly known as: ZOFRAN      Take by mouth every 8 hours as needed for nausea  Refills: 0     senna-docusate 8.6-50 MG tablet  Commonly known as: SENOKOT-S/PERICOLACE      Dose: 1 tablet  Take 1 tablet by mouth 2 times daily as needed for constipation  Refills: 0     terazosin 1 MG capsule  Commonly known as: HYTRIN      Dose: 1 mg  Take 1 mg by mouth At Bedtime  Refills: 0     WARFARIN SODIUM PO      11/21/22 INR 2.65  Cont 5mg M-W-F and 7.5mg AOD.  Next INR 11/28/22.   11/16/22 INR 2.78  Take 5mg M-W-F and 7.5mg AOD.  Next INR 11/21/22.  Refills: 0        STOP taking    ondansetron 4 MG ODT tab  Commonly known as: ZOFRAN ODT  Stopped by: Harleen Angeles CNP               ASSESSMENT/PLAN  Encounter Diagnoses   Name Primary?     Closed nondisplaced fracture of pelvis, unspecified part of pelvis, initial encounter (H) Yes     Physical deconditioning      Pain management      Syncope denied dizziness    Physical deconditioning Home care services PT OT speech therapy and RN 11/23/2022    Pain management as needed Tylenol and oxycodone    Right pelvis fracture follow-up with orthopedics, pain control    Hypertension on Metroprolol succinate and lisinopril    Atrial fibrillation  on Coumadin monitor and adjust per INRs, continue metoprolol succinate    autoimmune hepatitis and chronic idiopathic postoperative purpura on azathioprine,      DISCHARGE PLAN/FACE TO FACE:  I certify that services are/were furnished while this patient was under the care of a physician and that a physician or an allowed non-physician practitioner (NPP), had a face-to-face encounter that meets the physician face-to-face encounter requirements. The encounter was in whole, or in part, related to the primary reason for home health. The patient is confined to his/her home and needs intermittent skilled nursing, physical therapy, speech-language pathology, or the continued need for occupational therapy. A plan of care has been established by a physician and is periodically reviewed by a physician.  Date of Face-to-Face Encounter: 11/23/2022     I certify that, based on my findings, the following services are medically necessary home health services: PT OT speech therapy RN    My clinical findings support the need for the above skilled services because: PT OT for continued strength and endurance, RN for vital signs medication management, speech therapy for swallowing    This patient is homebound because: He is deconditioned easily fatigued following recent hospitalization for right pelvic fracture    The patient is, or has been, under my care and I have initiated the establishment of the plan of care. This patient will be followed by a physician who will periodically review the plan of care.    Schedule follow up visit with primary care provider within 7 days to reestablish care.    Electronically signed by: Harleen Angeles CNP

## 2022-11-23 NOTE — LETTER
11/23/2022        RE: Carlos Ch  [address] [facility] [address]        A face-to-face for discharge.  He will discharge to home on 11/26/2022 with current medications and treatments.  He will have [facility]  Chief Complaint   Patient presents with     Discharge Summary Nursing Home     [facility] Medical Record Number:  [id number]  Place of Service where encounter took place:  [facility] ([facility]) [[id number]]  Code Status:  No CPR- Do NOT Intubate     HISTORY:      HPI:  Carlos Ch  is 77 year old (1945) undergoing physical and occupational therapy.He has a past medical history of atrial fibrillation on warfarin, autoimmune hepatitis and chronic idiopathic postoperative purpura on azathioprine, hypertension, BPH, and parkinsonism, who sustained a fall and found with pelvic fracture.  Orthopedics consulted, non surgical intervention   Has a history of orthostatic hypotension and tachybradycardia syndrome s/p pacemaker.     Today was seen to review vital signs, labs, routine visit and  a face-to-face for discharge.  He will discharge to home on 11/26/2022 with current medications and treatments.  He will have home care services PT OT speech therapy and RN.  He denied chest pain shortness of breath cough congestion.  He is now moving his bowels.  He is on Coumadin and next INR will be due on 11/28/2022.  His pain is controlled with current medications.    ALLERGIES:Clopidogrel, Celecoxib, and Sulfa drugs    PAST MEDICAL HISTORY:   Past Medical History:   Diagnosis Date     Tachy-wesly syndrome (H)        PAST SURGICAL HISTORY:   has a past surgical history that includes Temporary Pacemaker Insertion.    FAMILY HISTORY: family history includes Coronary Artery Disease in his brother; Diabetes in his brother.    SOCIAL HISTORY:      ROS:  Constitutional: Negative for activity change, appetite change, fatigue and fever.   HENT: Negative for congestion.    Respiratory:  Negative for cough, shortness of breath and wheezing.    Cardiovascular: Negative for chest pain and leg swelling.  Pacemaker  Gastrointestinal: Negative for abdominal distention, abdominal pain, constipation, diarrhea and nausea.   Genitourinary: Negative for dysuria.   Musculoskeletal: positive  for arthralgia. Negative for back pain.   Skin: Negative for color change and wound.   Neurological: Negative for dizziness.   Psychiatric/Behavioral: Negative for agitation, behavioral problems and confusion.     Physical Exam:  Constitutional:       Appearance: Patient is well-developed.   HENT:      Head: Normocephalic.   Eyes:      Conjunctiva/sclera: Conjunctivae normal.   Neck:      Musculoskeletal: Normal range of motion.   Cardiovascular:      Rate and Rhythm: Normal rate and regular rhythm.      Heart sounds: Normal heart sounds. No murmur.   Pulmonary:      Effort: No respiratory distress.      Breath sounds: Normal breath sounds. No wheezing or rales.   Abdominal:      General: Bowel sounds are normal. There is no distension.      Palpations: Abdomen is soft.      Tenderness: There is no abdominal tenderness.   Musculoskeletal:       Normal range of motion.     Skin:General:        Skin is warm.   Neurological:         Mental Status: Patient is alert and oriented to person, place, and time.   Psychiatric:         Behavior: Behavior normal.     Vitals:/72   Pulse 97   Temp (!) 96.5  F (35.8  C)   Resp 18   Ht 1.829 m (6')   Wt 97.5 kg (215 lb)   SpO2 99%   BMI 29.16 kg/m   and Body mass index is 29.16 kg/m .    Lab/Diagnostic data:   Recent Results (from the past 240 hour(s))   INR    Collection Time: 11/14/22  8:55 AM   Result Value Ref Range    INR 3.30 (H) 0.85 - 1.15   Basic metabolic panel    Collection Time: 11/15/22 11:50 AM   Result Value Ref Range    Sodium 144 136 - 145 mmol/L    Potassium 4.5 3.4 - 5.3 mmol/L    Chloride 108 (H) 98 - 107 mmol/L    Carbon Dioxide (CO2) 22 22 - 29 mmol/L     Anion Gap 14 7 - 15 mmol/L    Urea Nitrogen 23.6 (H) 8.0 - 23.0 mg/dL    Creatinine 0.95 0.67 - 1.17 mg/dL    Calcium 9.9 8.8 - 10.2 mg/dL    Glucose 125 (H) 70 - 99 mg/dL    GFR Estimate 82 >60 mL/min/1.73m2   CBC with platelets    Collection Time: 11/15/22 11:50 AM   Result Value Ref Range    WBC Count 6.4 4.0 - 11.0 10e3/uL    RBC Count 4.67 4.40 - 5.90 10e6/uL    Hemoglobin 14.7 13.3 - 17.7 g/dL    Hematocrit 45.2 40.0 - 53.0 %    MCV 97 78 - 100 fL    MCH 31.5 26.5 - 33.0 pg    MCHC 32.5 31.5 - 36.5 g/dL    RDW 13.4 10.0 - 15.0 %    Platelet Count 139 (L) 150 - 450 10e3/uL   Magnesium    Collection Time: 11/15/22 11:50 AM   Result Value Ref Range    Magnesium 2.1 1.7 - 2.3 mg/dL   INR    Collection Time: 11/16/22  8:05 AM   Result Value Ref Range    INR 2.78 (H) 0.85 - 1.15   INR    Collection Time: 11/21/22  7:05 AM   Result Value Ref Range    INR 2.65 (H) 0.85 - 1.15       MEDICATIONS:     Review of your medicines          Accurate as of November 23, 2022  3:12 PM. If you have any questions, ask your nurse or doctor.            CONTINUE these medicines which may have CHANGED, or have new prescriptions. If we are uncertain of the size of tablets/capsules you have at home, strength may be listed as something that might have changed.      Dose / Directions   oxyCODONE 5 MG tablet  Commonly known as: ROXICODONE  This may have changed: how much to take  Used for: Closed nondisplaced fracture of pelvis, unspecified part of pelvis, initial encounter (H)      Dose: 2.5-5 mg  Take 0.5-1 tablets (2.5-5 mg) by mouth every 6 hours as needed for severe pain  Quantity: 7 tablet  Refills: 0        CONTINUE these medicines which have NOT CHANGED      Dose / Directions   acetaminophen 325 MG tablet  Commonly known as: TYLENOL      Dose: 325-650 mg  Take 325-650 mg by mouth every 6 hours as needed for mild pain  Refills: 0     aspirin 81 MG EC tablet      Dose: 81 mg  Take 81 mg by mouth At Bedtime  Refills: 0     atorvastatin  40 MG tablet  Commonly known as: LIPITOR      Dose: 40 mg  Take 40 mg by mouth At Bedtime  Refills: 0     azaTHIOprine 50 MG tablet  Commonly known as: IMURAN      Dose: 50 mg  Take 50 mg by mouth daily  Refills: 0     calcium polycarbophil 625 MG tablet  Commonly known as: FIBERCON      Dose: 2 tablet  Take 2 tablets by mouth daily  Refills: 0     finasteride 5 MG tablet  Commonly known as: PROSCAR      Dose: 5 mg  Take 5 mg by mouth At Bedtime  Refills: 0     lisinopril 20 MG tablet  Commonly known as: ZESTRIL      Dose: 20 mg  Take 20 mg by mouth daily  Refills: 0     metoprolol succinate ER 25 MG 24 hr tablet  Commonly known as: TOPROL XL      Dose: 12.5 mg  Take 12.5 mg by mouth daily  Refills: 0     multivitamin w/minerals tablet      Dose: 1 tablet  Take 1 tablet by mouth daily  Refills: 0     ondansetron 4 MG tablet  Commonly known as: ZOFRAN      Take by mouth every 8 hours as needed for nausea  Refills: 0     senna-docusate 8.6-50 MG tablet  Commonly known as: SENOKOT-S/PERICOLACE      Dose: 1 tablet  Take 1 tablet by mouth 2 times daily as needed for constipation  Refills: 0     terazosin 1 MG capsule  Commonly known as: HYTRIN      Dose: 1 mg  Take 1 mg by mouth At Bedtime  Refills: 0     WARFARIN SODIUM PO      11/21/22 INR 2.65  Cont 5mg M-W-F and 7.5mg AOD.  Next INR 11/28/22.   11/16/22 INR 2.78  Take 5mg M-W-F and 7.5mg AOD.  Next INR 11/21/22.  Refills: 0        STOP taking    ondansetron 4 MG ODT tab  Commonly known as: ZOFRAN ODT  Stopped by: Harleen Angeles CNP               ASSESSMENT/PLAN  Encounter Diagnoses   Name Primary?     Closed nondisplaced fracture of pelvis, unspecified part of pelvis, initial encounter (H) Yes     Physical deconditioning      Pain management      Syncope denied dizziness    Physical deconditioning Home care services PT OT speech therapy and RN 11/23/2022    Pain management as needed Tylenol and oxycodone    Right pelvis fracture follow-up with orthopedics, pain  control    Hypertension on Metroprolol succinate and lisinopril    Atrial fibrillation on Coumadin monitor and adjust per INRs, continue metoprolol succinate    autoimmune hepatitis and chronic idiopathic postoperative purpura on azathioprine,      DISCHARGE PLAN/FACE TO FACE:  I certify that services are/were furnished while this patient was under the care of a physician and that a physician or an allowed non-physician practitioner (NPP), had a face-to-face encounter that meets the physician face-to-face encounter requirements. The encounter was in whole, or in part, related to the primary reason for home health. The patient is confined to his/her home and needs intermittent skilled nursing, physical therapy, speech-language pathology, or the continued need for occupational therapy. A plan of care has been established by a physician and is periodically reviewed by a physician.  Date of Face-to-Face Encounter: 11/23/2022     I certify that, based on my findings, the following services are medically necessary home health services: PT OT speech therapy RN    My clinical findings support the need for the above skilled services because: PT OT for continued strength and endurance, RN for vital signs medication management, speech therapy for swallowing    This patient is homebound because: He is deconditioned easily fatigued following recent hospitalization for right pelvic fracture    The patient is, or has been, under my care and I have initiated the establishment of the plan of care. This patient will be followed by a physician who will periodically review the plan of care.    Schedule follow up visit with primary care provider within 7 days to reestablish care.    Electronically signed by: Harleen Angeles CNP            Sincerely,        Harleen Angeles CNP

## 2022-12-12 ENCOUNTER — LAB (OUTPATIENT)
Dept: LAB | Facility: CLINIC | Age: 77
End: 2022-12-12
Payer: MEDICARE

## 2022-12-12 ENCOUNTER — OFFICE VISIT (OUTPATIENT)
Dept: NEUROLOGY | Facility: CLINIC | Age: 77
End: 2022-12-12
Payer: MEDICARE

## 2022-12-12 VITALS — DIASTOLIC BLOOD PRESSURE: 75 MMHG | SYSTOLIC BLOOD PRESSURE: 108 MMHG | HEART RATE: 73 BPM

## 2022-12-12 DIAGNOSIS — G62.9 NEUROPATHY: ICD-10-CM

## 2022-12-12 DIAGNOSIS — G20.A1 PARKINSON'S DISEASE (H): ICD-10-CM

## 2022-12-12 DIAGNOSIS — G20.A1 PARKINSON'S DISEASE (H): Primary | ICD-10-CM

## 2022-12-12 LAB
TOTAL PROTEIN SERUM FOR ELP: 6.4 G/DL (ref 6.4–8.3)
VIT B12 SERPL-MCNC: 687 PG/ML (ref 232–1245)

## 2022-12-12 PROCEDURE — 99215 OFFICE O/P EST HI 40 MIN: CPT | Performed by: PSYCHIATRY & NEUROLOGY

## 2022-12-12 PROCEDURE — 84165 PROTEIN E-PHORESIS SERUM: CPT | Mod: TC | Performed by: PATHOLOGY

## 2022-12-12 PROCEDURE — 82607 VITAMIN B-12: CPT

## 2022-12-12 PROCEDURE — 86334 IMMUNOFIX E-PHORESIS SERUM: CPT | Performed by: PATHOLOGY

## 2022-12-12 PROCEDURE — 84155 ASSAY OF PROTEIN SERUM: CPT

## 2022-12-12 PROCEDURE — 83090 ASSAY OF HOMOCYSTEINE: CPT

## 2022-12-12 PROCEDURE — 36415 COLL VENOUS BLD VENIPUNCTURE: CPT

## 2022-12-12 PROCEDURE — 83921 ORGANIC ACID SINGLE QUANT: CPT

## 2022-12-12 NOTE — LETTER
12/12/2022         RE: Carlos Ch  [address]  [facility] [address]        Dear Colleague,    Thank you for referring your patient, Carlos Ch, to the [facility]. Please see a copy of my visit note below.    [facility] Neurology Follow Up Visit    Carlos Ch MRN# [id number]   Age: 77 year old YOB: 1945     Brief history of symptoms: The patient was initially seen in neurologic consultation on 10/10/2022 for evaluation of imbalance. Please see the comprehensive neurologic consultation notes from those dates in the Epic records for details.     Overall impression was for the presence of a distal symmetric sensory predominant polyneuropathy, as well as parkinsonism on exam.  He was to reduce reglan, utilize sinemet trial (25/100 TID for 2-4 weeks), and obtain a PT evaluation for gait/mobility.    Interval history:   - The patient reported having vertigo while taking sinemet, so he was advised to discontinue this medication 10/20/2022.  - The patient suffered a fall 11/10/2022 which led to a pelvic fracture.  Conservative therapy was recommended.  HE was discharged 11/12/2022 with plans to follow with TCU and then PT/OT at home for gait/conditioning.    Today, the patient is here with his wife.  They notice that he has more slurred speech.  He has had a deconditioned state due to the pelvic fracture, and at this time has energy enough to get up and walk short distances.  He hasn't had worsened tremor. He isn't reporting periods of passing out more-so than he has had in the past.  There is no behavioral issues at night or with dreaming.  He isn't having freezing in his limbs. There is no drooling.     Physical Exam:   Vitals: /75 (BP Location: Right arm, Patient Position: Sitting)   Pulse 73    General: Seated comfortably in no acute distress.  Limited blink rate.   HEENT: Neck supple with normal range of motion.   Neurologic:     Mental Status:  "Fully alert, attentive and oriented. Speech is slightly dysarthric at this time, often slurring \"Cah\" phonemes but having limited issues with rapid alternation of phonemes.       Cranial Nerves: EOMI with normal smooth pursuit. Facial movements symmetric. Hearing not formally tested but intact to conversation.      Motor: Mild resting hand tremor on right (4-5 Hz, wrist pronation/supination) noted today. Increased tone throughout upper extremities. Hand open-closed positions were slower on left compared to right.      Sensory: Positive Romberg.  Vibration absent at great toes and MM b/l.     Coordination: Finger-nose-finger without dysmetria.     Gait: In wheelchair today.         Assessment and Plan:   Assessment:  Parkinsonian symptoms  Severe distal symmetric polyneuropathy    The patient had severe vertigo with use of low dose sinemet, and I would not necessarily retrial this medication given his non-tremor predominant parkinsonism may not necessarily show improvement in the setting of his severe peripheral neuropathy and sensorimotor ataxia.  I think looking into alternative and reversible causes of neuropathy is needed with serum studies at this time, in-lue of EMG as there is little to suggest radicular process or plexopathy being present on exam.  His swallowing issues are concerning for progressive choking risk often seen with PD, and an SLP evaluation may be helpful to give better precautionary advice to reduce his choking risks.  An MRI brain would be helpful along with a movement disorder referral, as the images would help define whether there is specified types of atrophy seen (brainstem ~ PSP, CBD, b/l parietal/cortical, parahippocampal) or signs of stroke/injury of the Basal ganglia or pontine region/midbrain region.  If the imaging isn't suggestive for alternative etiology in regards to Parkinsonism, then the movement disorder referral would be beneficial in consideration of Damion-Scan or other " evaluations for parkinsonism.     Plan:  SPEP, immunofixation, B12, MMA, Homocysteine  MRI brain w/wout  Movement disorder referral  SLP     Follow up in Neurology clinic in 6 months, or earlier as needed should new concerns arise.    YORDY Gusman D.O.   of Neurology    Total time today (45 min) in this patient encounter was spent on pre-charting, counseling and/or coordination of care.         Again, thank you for allowing me to participate in the care of your patient.        Sincerely,        Marlo Gusman, DO

## 2022-12-12 NOTE — NURSING NOTE
Chief Complaint   Patient presents with     Follow Up         RYLEY Darling on 12/12/2022 at 8:52 AM

## 2022-12-12 NOTE — PROGRESS NOTES
"[facility] Neurology Follow Up Visit    Carlos Ch MRN# [id number]   Age: 77 year old YOB: 1945     Brief history of symptoms: The patient was initially seen in neurologic consultation on 10/10/2022 for evaluation of imbalance. Please see the comprehensive neurologic consultation notes from those dates in the Epic records for details.     Overall impression was for the presence of a distal symmetric sensory predominant polyneuropathy, as well as parkinsonism on exam.  He was to reduce reglan, utilize sinemet trial (25/100 TID for 2-4 weeks), and obtain a PT evaluation for gait/mobility.    Interval history:   - The patient reported having vertigo while taking sinemet, so he was advised to discontinue this medication 10/20/2022.  - The patient suffered a fall 11/10/2022 which led to a pelvic fracture.  Conservative therapy was recommended.  HE was discharged 11/12/2022 with plans to follow with TCU and then PT/OT at home for gait/conditioning.    Today, the patient is here with his wife.  They notice that he has more slurred speech.  He has had a deconditioned state due to the pelvic fracture, and at this time has energy enough to get up and walk short distances.  He hasn't had worsened tremor. He isn't reporting periods of passing out more-so than he has had in the past.  There is no behavioral issues at night or with dreaming.  He isn't having freezing in his limbs. There is no drooling.     Physical Exam:   Vitals: /75 (BP Location: Right arm, Patient Position: Sitting)   Pulse 73    General: Seated comfortably in no acute distress.  Limited blink rate.   HEENT: Neck supple with normal range of motion.   Neurologic:     Mental Status: Fully alert, attentive and oriented. Speech is slightly dysarthric at this time, often slurring \"Cah\" phonemes but having limited issues with rapid alternation of phonemes.       Cranial Nerves: EOMI with normal smooth pursuit. Facial movements symmetric. Hearing " not formally tested but intact to conversation.      Motor: Mild resting hand tremor on right (4-5 Hz, wrist pronation/supination) noted today. Increased tone throughout upper extremities. Hand open-closed positions were slower on left compared to right.      Sensory: Positive Romberg.  Vibration absent at great toes and MM b/l.     Coordination: Finger-nose-finger without dysmetria.     Gait: In wheelchair today.         Assessment and Plan:   Assessment:  Parkinsonian symptoms  Severe distal symmetric polyneuropathy    The patient had severe vertigo with use of low dose sinemet, and I would not necessarily retrial this medication given his non-tremor predominant parkinsonism may not necessarily show improvement in the setting of his severe peripheral neuropathy and sensorimotor ataxia.  I think looking into alternative and reversible causes of neuropathy is needed with serum studies at this time, in-lue of EMG as there is little to suggest radicular process or plexopathy being present on exam.  His swallowing issues are concerning for progressive choking risk often seen with PD, and an SLP evaluation may be helpful to give better precautionary advice to reduce his choking risks.  An MRI brain would be helpful along with a movement disorder referral, as the images would help define whether there is specified types of atrophy seen (brainstem ~ PSP, CBD, b/l parietal/cortical, parahippocampal) or signs of stroke/injury of the Basal ganglia or pontine region/midbrain region.  If the imaging isn't suggestive for alternative etiology in regards to Parkinsonism, then the movement disorder referral would be beneficial in consideration of Damion-Scan or other evaluations for parkinsonism.     Plan:  SPEP, immunofixation, B12, MMA, Homocysteine  MRI brain w/wout  Movement disorder referral  SLP     Follow up in Neurology clinic in 6 months, or earlier as needed should new concerns arise.    YORDY Gusman D.O.  Assistant  Professor of Neurology    Total time today (45 min) in this patient encounter was spent on pre-charting, counseling and/or coordination of care.

## 2022-12-12 NOTE — PATIENT INSTRUCTIONS
Given your parkinsonism, and issues with side-effects when using sinemet, I am uncertain if another medical trial is needed/would be helpful for your treatment.  I would want to rule out issues of stroke in the brainstem being present, which would require an MRI brain.  I also want to see if your neuropathy has a reversible cause by having you obtain some labs today.      I do worry that your swallowing/speaking issues could be from parkinsonism, and feel that at this point a swallow study with a speech therapist and a referral into a movement disorder clinic may be helpful.

## 2022-12-13 LAB
ALBUMIN SERPL ELPH-MCNC: 3.7 G/DL (ref 3.7–5.1)
ALPHA1 GLOB SERPL ELPH-MCNC: 0.3 G/DL (ref 0.2–0.4)
ALPHA2 GLOB SERPL ELPH-MCNC: 0.7 G/DL (ref 0.5–0.9)
B-GLOBULIN SERPL ELPH-MCNC: 0.9 G/DL (ref 0.6–1)
GAMMA GLOB SERPL ELPH-MCNC: 0.7 G/DL (ref 0.7–1.6)
M PROTEIN SERPL ELPH-MCNC: 0 G/DL
PROT PATTERN SERPL ELPH-IMP: NORMAL
PROT PATTERN SERPL IFE-IMP: NORMAL

## 2022-12-13 PROCEDURE — 86334 IMMUNOFIX E-PHORESIS SERUM: CPT | Mod: 26

## 2022-12-13 PROCEDURE — 84165 PROTEIN E-PHORESIS SERUM: CPT | Mod: 26

## 2022-12-14 LAB
HCYS SERPL-SCNC: 9 UMOL/L (ref 4–12)
METHYLMALONATE SERPL-SCNC: 0.25 UMOL/L (ref 0–0.4)

## 2022-12-16 ENCOUNTER — TELEPHONE (OUTPATIENT)
Dept: NEUROLOGY | Facility: CLINIC | Age: 77
End: 2022-12-16

## 2022-12-16 NOTE — TELEPHONE ENCOUNTER
Per Dr. Gusman, All results are normal     Called pt and informed him the labs ordered by Dr. Gusman are normal and he verbally understood.

## 2022-12-20 ENCOUNTER — DOCUMENTATION ONLY (OUTPATIENT)
Dept: CARDIOLOGY | Facility: CLINIC | Age: 77
End: 2022-12-20

## 2022-12-20 NOTE — PROGRESS NOTES
Is the implanted device safe for MRI Exam?  Yes  Is this device 3T compatible? Yes  Device Type: Pacemaker      Device Information:  Make: [employer]  Model: Assurity 2272    Cardiology Orders for Device Programming     -- Yes -- The patient has a MRI conditional pulse generator and leads from the same     -- Yes -- The pulse generator and leads have been implanted for at least 6 weeks    -- Yes-- The device is implanted in the right or left pectoral region    -- Yes -- There are not any additional active cardiac devices, abandoned leads, lead extenders or adapters    -- Yes -- The device lead impedance measurements are within the normal range. (Manufacture recommendations: Medtronic Advisa and Revo 200-1,500 ohms; Medtronic ICD and CRT's 200-3000 ohms and defibrillation lead impedance   ohms)    -- N/A -- If the patient is pacemaker dependent the thresholds are less than or equal to 2.0V @ 0.4ms.     Date of last Remote Device check: 11/3/2022  Results of last in-office Device check:  1.   Right atrium impedance: 450 ohms  2.   Right ventricle impedance: 530 ohms  3.   Left ventricle impedance: NA     4.   Right atrium threshold: 0.5V@0.5ms  5.   Right ventricle threshold: 0.75V@0.5ms  6.   Left ventricle threshold: NA     Device programming during the scan guidelines   Pacing Mode (check one): DOO  Pacing Rate: 80  bpm or 20bpm above intrinsic rate

## 2022-12-29 ENCOUNTER — HOSPITAL ENCOUNTER (OUTPATIENT)
Dept: MRI IMAGING | Facility: CLINIC | Age: 77
Discharge: HOME | End: 2022-12-29
Attending: PSYCHIATRY & NEUROLOGY
Payer: MEDICARE

## 2022-12-29 ENCOUNTER — HOSPITAL ENCOUNTER (OUTPATIENT)
Dept: RADIOLOGY | Facility: CLINIC | Age: 77
Discharge: HOME | End: 2022-12-29
Attending: PSYCHIATRY & NEUROLOGY
Payer: MEDICARE

## 2022-12-29 VITALS — OXYGEN SATURATION: 95 % | HEART RATE: 100 BPM

## 2022-12-29 DIAGNOSIS — Z95.0 MRI SAFE CARDIAC PACEMAKER IN SITU: ICD-10-CM

## 2022-12-29 DIAGNOSIS — G20.A1 PARKINSON'S DISEASE (H): ICD-10-CM

## 2022-12-29 DIAGNOSIS — G62.9 NEUROPATHY: ICD-10-CM

## 2022-12-29 PROCEDURE — 255N000002 HC RX 255 OP 636: Performed by: PSYCHIATRY & NEUROLOGY

## 2022-12-29 PROCEDURE — 70553 MRI BRAIN STEM W/O & W/DYE: CPT | Mod: MG

## 2022-12-29 PROCEDURE — A9585 GADOBUTROL INJECTION: HCPCS | Performed by: PSYCHIATRY & NEUROLOGY

## 2022-12-29 PROCEDURE — 71046 X-RAY EXAM CHEST 2 VIEWS: CPT

## 2022-12-29 RX ORDER — GADOBUTROL 604.72 MG/ML
10 INJECTION INTRAVENOUS ONCE
Status: COMPLETED | OUTPATIENT
Start: 2022-12-29 | End: 2022-12-29

## 2022-12-29 RX ADMIN — GADOBUTROL 10 ML: 604.72 INJECTION INTRAVENOUS at 10:43

## 2023-01-04 ENCOUNTER — CARE COORDINATION (OUTPATIENT)
Dept: NEUROLOGY | Facility: CLINIC | Age: 78
End: 2023-01-04

## 2023-01-04 NOTE — PROGRESS NOTES
No specific findings on MRI for any type of patterned atrophy or stroke  ============================  I called and left a voice message with the above information per Dr Gusman

## 2023-06-12 ENCOUNTER — OFFICE VISIT (OUTPATIENT)
Dept: NEUROLOGY | Facility: CLINIC | Age: 78
End: 2023-06-12
Payer: MEDICARE

## 2023-06-12 VITALS — SYSTOLIC BLOOD PRESSURE: 140 MMHG | HEART RATE: 72 BPM | DIASTOLIC BLOOD PRESSURE: 93 MMHG

## 2023-06-12 DIAGNOSIS — R55 SYNCOPE, UNSPECIFIED SYNCOPE TYPE: Primary | ICD-10-CM

## 2023-06-12 PROCEDURE — 99215 OFFICE O/P EST HI 40 MIN: CPT | Performed by: PSYCHIATRY & NEUROLOGY

## 2023-06-12 NOTE — PATIENT INSTRUCTIONS
I think some of your passing out/altered state episodes could be cardiac in nature, and that you should have a tilt table test through cardiology.  I would recommend you contact your cardiologist to see if they would do this test,otherwise you could do it through our cardiology team    I think some of your symptoms still line up slightly with parkinsonism, and would like you to keep your appointment with Dr. Walton.  What I am hoping for is a better evaluation of your movements, and to determine if you would be a candidate for an advanced image or another medication trial beyond sinemet.

## 2023-06-12 NOTE — PROGRESS NOTES
[facility] Neurology Follow Up Visit    Carlos Ch MRN# [id number]   Age: 78 year old YOB: 1945     Brief history of symptoms: The patient was initially seen in neurologic consultation on 10/10/2022 and most recently 12/12/2022 for evaluation of imbalance. Please see the comprehensive neurologic consultation notes from those dates in the Epic records for details.     Overall impression was for the patient having a polyneuropathy leading to sensorimotor ataxia as well as parkinsonism on exam.  He tried sinemet but this led to vertigo so he discontinued it 10/20/2022.  He had falls leading a pelvic fracture, as well as worsened slurred speech over time which led to recommendations for new imaging of the brain, and a referral to movement disorder specialists.    Interval history:   - MRI brain w/wout contrast 12/29/2022 was unrevealing for encephalomalacia, atrophy patterns, or infarction.  - Seen with cardiology 2/14/2023 where it was noted he had a high atrial rate on his last device check, so his metoprolol was increased.  He had been noted to have had no further falls with reduction in lisinopril and increase in metoprolol.    Today, the patient doesn't report worsened issues of slowed walking, drooling, confusion events, or hallucinations.  He isn't having periods of tiredness come over him, or signs of lateralized weakness.  He doesn't recall many of his falls events or events reported by his wife, and defers to her in describing these events.  She tells me she witnessed an event in the spring of 2023, which may be similar to his other falls he had where he was unaware of what occurred.  She noticed he was on the bed and trying to stand. He couldn't stand upright from that seated position and kept falling back onto the bed.  She tried to help him but he was unstable upon standing fully upright, and again fell backward into the bed.  He seemed off to his wife, and wasn't quite answering questions easily  or quickly.  This whole issue led to a     He Starts whispering at night according to his wife, and she described a hypophonic speaking tone and pattern.  She has noticed he is frequently clearing his throat, but he hasn't had choking event or swallowing deficits to speak of.  She feels he has days when he shuffles his feet to a great extent, but other times where there is no shuffling.  He stopped taking Reglan for 6 months now.      In the day and night, short walks are starting to lead to dyspnea.  Just walking a few feet (Bed to bathroom) will lead to a development of severe tiredness, difficulty breathing, and somnolence.     Physical Exam:   Vitals: BP (!) 140/93 (BP Location: Right arm, Patient Position: Sitting, Cuff Size: Adult Regular)   Pulse 72    General: Seated comfortably in no acute distress.  Neurologic:     Mental Status: Fully alert, attentive and oriented. Speech clear and fluent, no paraphasic errors.     Cranial Nerves: EOMI with normal smooth pursuit. Facial movements symmetric. Hearing not formally tested but intact to conversation.  No dysarthria.     Motor: No tremors or other abnormal movements observed (didn't notice the resting tremor today).  Still with increased tone and rigidity on b/l upper extremities.  HF while sitting is full 5/5 b/l.       Sensory: Positive Romberg. Vibration still absent at toes, MM b/l.      Coordination: Finger-nose-finger without dysmetria.     Gait: Standing with arms across chest is still difficult and requires rocking. Wider base 6+ inches. Stride is moderate and raises leg easily b/l. No drag. [place] steps to turn around.  No arm swing. Pull back testing was normal today.         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  To further define if there is any semblance of autonomic dysfunction, I think a tilt table test would be helpful.  Even if it is positive, I don't necessarily it proves he has some parkinsonian condition given his neuropathy.  This is the reason he should keep his appointment with our movement team, as I would like their opinion on whether he would benefit from having a Damion Scan, especially since he was unable to tolerate a sinemet trial.     Plan:  - Cardiology referral for tilt table consideration  - Keep appointment with Dr. Walton    Follow up in Neurology clinic in 4 months or earlier as needed should new concerns arise.    YORDY Gusman D.O.   of Neurology    Total time today (46 min) in this patient encounter was spent on pre-charting, counseling and/or coordination of care.

## 2023-06-12 NOTE — LETTER
6/12/2023         RE: Carlos Ch  [address] [place] [address]        Dear Colleague,    Thank you for referring your patient, Carlos Ch, to the [facility] NEUROLOGY CLINIC [facility]. Please see a copy of my visit note below.    [facility] Neurology Follow Up Visit    Carlos Ch MRN# [id number]   Age: 78 year old YOB: 1945     Brief history of symptoms: The patient was initially seen in neurologic consultation on 10/10/2022 and most recently 12/12/2022 for evaluation of imbalance. Please see the comprehensive neurologic consultation notes from those dates in the Epic records for details.     Overall impression was for the patient having a polyneuropathy leading to sensorimotor ataxia as well as parkinsonism on exam.  He tried sinemet but this led to vertigo so he discontinued it 10/20/2022.  He had falls leading a pelvic fracture, as well as worsened slurred speech over time which led to recommendations for new imaging of the brain, and a referral to movement disorder specialists.    Interval history:   - MRI brain w/wout contrast 12/29/2022 was unrevealing for encephalomalacia, atrophy patterns, or infarction.  - Seen with cardiology 2/14/2023 where it was noted he had a high atrial rate on his last device check, so his metoprolol was increased.  He had been noted to have had no further falls with reduction in lisinopril and increase in metoprolol.    Today, the patient doesn't report worsened issues of slowed walking, drooling, confusion events, or hallucinations.  He isn't having periods of tiredness come over him, or signs of lateralized weakness.  He doesn't recall many of his falls events or events reported by his wife, and defers to her in describing these events.  She tells me she witnessed an event in the spring of 2023, which may be similar to his other falls he had where he was unaware of what occurred.  She noticed he was on the bed and trying to  stand. He couldn't stand upright from that seated position and kept falling back onto the bed.  She tried to help him but he was unstable upon standing fully upright, and again fell backward into the bed.  He seemed off to his wife, and wasn't quite answering questions easily or quickly.  This whole issue led to a     He Starts whispering at night according to his wife, and she described a hypophonic speaking tone and pattern.  She has noticed he is frequently clearing his throat, but he hasn't had choking event or swallowing deficits to speak of.  She feels he has days when he shuffles his feet to a great extent, but other times where there is no shuffling.  He stopped taking Reglan for 6 months now.      In the day and night, short walks are starting to lead to dyspnea.  Just walking a few feet (Bed to bathroom) will lead to a development of severe tiredness, difficulty breathing, and somnolence.     Physical Exam:   Vitals: BP (!) 140/93 (BP Location: Right arm, Patient Position: Sitting, Cuff Size: Adult Regular)   Pulse 72    General: Seated comfortably in no acute distress.  Neurologic:     Mental Status: Fully alert, attentive and oriented. Speech clear and fluent, no paraphasic errors.     Cranial Nerves: EOMI with normal smooth pursuit. Facial movements symmetric. Hearing not formally tested but intact to conversation.  No dysarthria.     Motor: No tremors or other abnormal movements observed (didn't notice the resting tremor today).  Still with increased tone and rigidity on b/l upper extremities.  HF while sitting is full 5/5 b/l.       Sensory: Positive Romberg. Vibration still absent at toes, MM b/l.      Coordination: Finger-nose-finger without dysmetria.     Gait: Standing with arms across chest is still difficult and requires rocking. Wider base 6+ inches. Stride is moderate and raises leg easily b/l. No drag. [place] steps to turn around.  No arm swing. Pull back testing was normal today.          Assessment and Plan:   Assessment:  Sensorimotor ataxia with neuropathy (possibly dysautonomia)  Parkinsonism    The patient's events of not being able to stand from bed, confusion/poor memory are not quite consistent with orthostatic hypotension and autonomic dysfunction, and I would be concerned about possible cardiac etiologies given his cardiac history alone.  To further define if there is any semblance of autonomic dysfunction, I think a tilt table test would be helpful.  Even if it is positive, I don't necessarily it proves he has some parkinsonian condition given his neuropathy.  This is the reason he should keep his appointment with our movement team, as I would like their opinion on whether he would benefit from having a Damion Scan, especially since he was unable to tolerate a sinemet trial.     Plan:  - Cardiology referral for tilt table consideration  - Keep appointment with Dr. Walton    Follow up in Neurology clinic in 4 months or earlier as needed should new concerns arise.    YORDY Gusman D.O.   of Neurology    Total time today (46 min) in this patient encounter was spent on pre-charting, counseling and/or coordination of care.         Again, thank you for allowing me to participate in the care of your patient.        Sincerely,        Marlo Gusman, DO

## 2023-08-23 ENCOUNTER — OFFICE VISIT (OUTPATIENT)
Dept: NEUROLOGY | Facility: CLINIC | Age: 78
End: 2023-08-23
Attending: PSYCHIATRY & NEUROLOGY
Payer: MEDICARE

## 2023-08-23 VITALS — HEART RATE: 73 BPM | SYSTOLIC BLOOD PRESSURE: 105 MMHG | DIASTOLIC BLOOD PRESSURE: 70 MMHG

## 2023-08-23 DIAGNOSIS — G62.9 NEUROPATHY: ICD-10-CM

## 2023-08-23 DIAGNOSIS — G20.A1 PARKINSON'S DISEASE (H): ICD-10-CM

## 2023-08-23 PROCEDURE — 99417 PROLNG OP E/M EACH 15 MIN: CPT | Performed by: PSYCHIATRY & NEUROLOGY

## 2023-08-23 PROCEDURE — 99215 OFFICE O/P EST HI 40 MIN: CPT | Performed by: PSYCHIATRY & NEUROLOGY

## 2023-08-23 ASSESSMENT — UNIFIED PARKINSONS DISEASE RATING SCALE (UPDRS)
RIGIDITY_LUE: (1) SLIGHT: RIGIDITY ONLY DETECTED WITH ACTIVATION MANEUVER.
SPEECH: (1) SLIGHT: LOSS OF MODULATION, DICTION OR VOLUME, BUT STILL ALL WORDS EASY TO UNDERSTAND.
PRONATION_SUPINATION_LEFT: (1) SLIGHT: ANY OF THE FOLLOWING: A) THE REGULAR RHYTHM IS BROKEN WITH ONE WITH ONE OR TWO INTERRUPTIONS OR HESITATIONS OF THE MOVEMENT  B) SLIGHT SLOWING  C) THE AMPLITUDE DECREMENTS NEAR THE END OF THE 10 MOVEMENTS.
HANDMOVEMENTS_RIGHT: (1) SLIGHT: ANY OF THE FOLLOWING: A) THE REGULAR RHYTHM IS BROKEN WITH ONE WITH ONE OR TWO INTERRUPTIONS OR HESITATIONS OF THE MOVEMENT  B) SLIGHT SLOWING  C) THE AMPLITUDE DECREMENTS NEAR THE END OF THE 10 MOVEMENTS.
MOVEMENTS_INTERFERE_WITH_RATINGS: NO
TOETAPPING_LEFT: (1) SLIGHT: ANY OF THE FOLLOWING: A) THE REGULAR RHYTHM IS BROKEN WITH ONE WITH ONE OR TWO INTERRUPTIONS OR HESITATIONS OF THE MOVEMENT  B) SLIGHT SLOWING  C) THE AMPLITUDE DECREMENTS NEAR THE END OF THE 10 MOVEMENTS.
RIGIDITY_RLE: (2) MILD: RIGIDITY DETECTED WITHOUT THE ACTIVATION MANEUVER. FULL RANGE OF MOTION IS EASILY ACHIEVED.
AMPLITUDE_RUE: (0) NORMAL: NO TREMOR.
LEG_AGILITY_LEFT: (2) MILD: ANY OF THE FOLLOWING: A) 3 TO 5 INTERRUPTIONS DURING TAPPING  B) MILD SLOWING  C) THE AMPLITUDE DECREMENTS MIDWAY IN THE 10-MOVEMENT SEQUENCE.
AMPLITUDE_LUE: (0) NORMAL: NO TREMOR.
RIGIDITY_RUE: (2) MILD: RIGIDITY DETECTED WITHOUT THE ACTIVATION MANEUVER. FULL RANGE OF MOTION IS EASILY ACHIEVED.
LEG_AGILITY_RIGHT: (1) SLIGHT: ANY OF THE FOLLOWING: A) THE REGULAR RHYTHM IS BROKEN WITH ONE WITH ONE OR TWO INTERRUPTIONS OR HESITATIONS OF THE MOVEMENT  B) SLIGHT SLOWING  C) THE AMPLITUDE DECREMENTS NEAR THE END OF THE 10 MOVEMENTS.
AMPLITUDE_LLE: (0) NORMAL: NO TREMOR.
TOTAL_SCORE: 10
TOTAL_SCORE_LEFT: 11
RIGIDITY_LLE: (2) MILD: RIGIDITY DETECTED WITHOUT THE ACTIVATION MANEUVER. FULL RANGE OF MOTION IS EASILY ACHIEVED.
AMPLITUDE_RLE: (0) NORMAL: NO TREMOR.
FINGER_TAPPING_LEFT: (3) MODERATE: ANY OF THE FOLLOWING: A) MORE THAN 5 INTERRUPTIONS OR AT LEAST ONE LONGER ARREST (FREEZE) IN ONGOING MOVEMENT  B) MODERATE SLOWING  C) THE AMPLITUDE DECREMENTS STARTING AFTER THE FIRST MOVEMENT.
TOETAPPING_RIGHT: (1) SLIGHT: ANY OF THE FOLLOWING: A) THE REGULAR RHYTHM IS BROKEN WITH ONE WITH ONE OR TWO INTERRUPTIONS OR HESITATIONS OF THE MOVEMENT  B) SLIGHT SLOWING  C) THE AMPLITUDE DECREMENTS NEAR THE END OF THE 10 MOVEMENTS.
CONSTANCY_TREMOR_ATREST: (0) NORMAL: NO TREMOR.
AMPLITUDE_LIP_JAW: (0) NORMAL: NO TREMOR.
ARISING_CHAIR: (3) MODERATE: NEEDS TO PUSH OFF, BUT TENDS TO FALL BACK, OR MAY HAVE TO TRY MORE THAN ONE TIME USING ARMS OF CHAIR, BUT CAN GET UP WITHOUT HELP.
PARKINSONS_MEDS: OFF
RIGIDITY_NECK: (0) NORMAL: NO RIGIDITY.
FACIAL_EXPRESSION: (1) SLIGHT: MINIMAL MASKED FACIES MANIFESTED ONLY BY DECREASED FREQUENCY OF BLINKING.
DYSKINESIAS_PRESENT: NO
FINGER_TAPPING_RIGHT: (2) MILD: ANY OF THE FOLLOWING: A) 3 TO 5 INTERRUPTIONS DURING TAPPING  B) MILD SLOWING  C) THE AMPLITUDE DECREMENTS MIDWAY IN THE 10-MOVEMENT SEQUENCE.
FREEZING_GAIT: (1) SLIGHT: FREEZES ON STARTING, TURNING, OR WALKING THROUGH DOORWAY WITH A SINGLE HALT DURING ANY OF THESE EVENTS, BUT THEN CONTINUES SMOOTHLY WITHOUT FREEZING DURING STRAIGHT WALKING.
GAIT: (2) MILD: INDEPENDENT WALKING BUT WITH SUBSTANTIAL GAIT IMPAIRMENT.
HANDMOVEMENTS_LEFT: (1) SLIGHT: ANY OF THE FOLLOWING: A) THE REGULAR RHYTHM IS BROKEN WITH ONE WITH ONE OR TWO INTERRUPTIONS OR HESITATIONS OF THE MOVEMENT  B) SLIGHT SLOWING  C) THE AMPLITUDE DECREMENTS NEAR THE END OF THE 10 MOVEMENTS.
SPONTANEITY_OF_MOVEMENT: (2) MILD: MILD GLOBAL SLOWNESS AND POVERTY OF SPONTANEOUS MOVEMENTS.
POSTURE: (2) MILD: DEFINITE FLEXION, SCOLIOSIS OR LEANING TO ONE SIDE, BUT CAN CORRECT POSTURE TO NORMAL WHEN ASKED.
PRONATION_SUPINATION_RIGHT: (1) SLIGHT: ANY OF THE FOLLOWING: A) THE REGULAR RHYTHM IS BROKEN WITH ONE WITH ONE OR TWO INTERRUPTIONS OR HESITATIONS OF THE MOVEMENT  B) SLIGHT SLOWING  C) THE AMPLITUDE DECREMENTS NEAR THE END OF THE 10 MOVEMENTS.
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
## 2023-08-23 NOTE — PATIENT INSTRUCTIONS
"Plan:   - Carlos is interested in MRI-Atypical Parkinsonsm study at [facility]. Will have research coordinator contact patient.   - Discuss with Dr. Miner, Luke, Cardiology on liberating BP medications to improve hypotension. Suspect that low BP is contributing to fatigue. Will send a note to Dr. Miner requesting this.   - Once BP is improved, will consider restarting carbidopa/levodopa IR. Suspect the \"vertigo\" was due to low BP after starting carbidopa/levodopa IR.   - Schedule follow up with Dr. Armando, consider Mybetriq   - Continue exercise daily and safely. Continue Loud speech therapy exercises.   - Will consider a neurpsychology referral at next visit which may provide further information on diagnosis   - Continue working with Dr. Gusman for evaluation and management of neuropathy     Patient to return in 5-6 months, for in-person visit, 30 minutes.   "

## 2023-08-23 NOTE — NURSING NOTE
Chief Complaint   Patient presents with    Consult     Parkinson  Referred by Marlo Gusman DO Kena Gemeda, MA on 8/23/2023 at 2:11 PM

## 2023-08-23 NOTE — Clinical Note
Carlos Okeefe is interested in MRI-Atypical Parkinsonsm study. Will you please contact? 

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
## 2023-10-23 ENCOUNTER — OFFICE VISIT (OUTPATIENT)
Dept: NEUROLOGY | Facility: CLINIC | Age: 78
End: 2023-10-23
Payer: MEDICARE

## 2023-10-23 VITALS — SYSTOLIC BLOOD PRESSURE: 136 MMHG | DIASTOLIC BLOOD PRESSURE: 86 MMHG

## 2023-10-23 DIAGNOSIS — F32.0 CURRENT MILD EPISODE OF MAJOR DEPRESSIVE DISORDER WITHOUT PRIOR EPISODE (H): Primary | ICD-10-CM

## 2023-10-23 PROCEDURE — 99214 OFFICE O/P EST MOD 30 MIN: CPT | Performed by: PSYCHIATRY & NEUROLOGY

## 2023-10-23 NOTE — LETTER
10/23/2023         RE: Carlos Ch  [address] [place] [address]        Dear Colleague,    Thank you for referring your patient, Carlos Ch, to the [facility] NEUROLOGY CLINIC [facility]. Please see a copy of my visit note below.    [facility] Neurology Follow Up Visit    Carlos Ch MRN# [id number]   Age: 78 year old YOB: 1945     Brief history of symptoms: The patient was initially seen in neurologic consultation on 10/10/2022 and most recently 6/12/2023 for evaluation of imbalance. Please see the comprehensive neurologic consultation notes from those dates in the Epic records for details.     Prior MRI brain 12/29/2022 was unrevealing, but at our last visit his symptoms of parkinsonism seemed to have worsened while his neuropathic symptoms were stable. His prior trial of sinemet 10/20/2022 led to vertigo.  He was to be seen with Movement disorder specialist for further evaluation, as I suspected his parkinsonism may be leading to dysautonomia.    The patient is followed with Dr. Walton and was recently seen 8/23/2023 in regards to Parkinsonsim with suspicion for PSP.     Interval history:   - Seen with [facility] 9/28/2023 neurology providers for autonomic failure.     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  - EMG done through [facility] 10/20/2023 showed mild length dependent peripheral axonal neuropathy  - FDG PET on 10/20/2023 was possibly suggestive for mild FDG activity localizing in the posterior prostate gland.  There was no evidence for sarcoidosis or malignancy otherwise.  - MRI whole spine was recommended but I do not see imaging done  - Midodrine as managed through his PCP was recommended  - Movement disorder referral was  recommended    Today, the patient doesn't notice any cognitive or behavioral changes.  His wife feels that there are changes with his memory and/or personality.  He used to be on-top of things, but recently he is less interested in yard-work (was prideful of this in the past).  She worries they are both depressed, as their social life has become doctors appointments and worry.     The patient did open up today about his fears, and want for a diagnosis.  He does worry about his wife and her ability to help him, and doesn't want to be a burden.  He expressed guilt to some degree about his health.  He agrees with her about his lack of want to do things at times, even though he liked doing those things in the past.     Physical Exam:   Vitals: /86 (BP Location: Right arm, Patient Position: Sitting)    General: Seated comfortably in no acute distress.  HEENT: limited blink rate.  Emotive at times when in the right context, otherwise somewhat blank facies.  Neurologic:     Mental Status: Fully alert, attentive and oriented. Speech clear and fluent, no paraphasic errors.     Cranial Nerves: EOMI with normal smooth pursuit. Facial movements symmetric. Hearing not formally tested but intact to conversation.  No dysarthria but hypophonic vocal production.      Motor: No tremors noted at rest today, other abnormal movements observed.      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  We discussed that numbness isn't well treated with most neuropathic agents, which often are good for dysesthesias and not so much for numbness.  He wasn't interested in trialing medications at this time, but would let me know if he would want to in the future.      Otherwise, we spent much of today going over  prior evaluations from here and the [facility], and what the current idea is for his condition.  He expressed understanding at this point, but we also spoke of the behavioral changes that can accompany any major health condition.  He was open to speaking with a therapist, as was his wife.  They also felt alone, so-to-speak, in that they weren't signed up for MyChart and weren't knowledgeable about prior opinions and next steps in management. I will get them some help with setting MyChart up today, and asked them to contact Dr. Walton and the movement disorder team to see if there are local groups (support groups, PD groups) where the patient and his wife could get involved with.      If psychotherapy isn't helpful, I did ask that he consider an anti-depressant in the future.    Plan:  Mental health (psychology)  Aide patient in getting MyChart    Follow up in Neurology clinic in 6 months PRN (if neuropathy changes), or earlier as needed should new concerns arise.    YORDY Gusman D.O.   of Neurology    Total time today (35 min) in this patient encounter was spent on pre-charting, counseling and/or coordination of care.       Again, thank you for allowing me to participate in the care of your patient.        Sincerely,        Marlo Gusman, DO

## 2023-10-23 NOTE — NURSING NOTE
Chief Complaint   Patient presents with    Follow Up     Return       Scarlet Quarles MA on 10/23/2023 at 11:10 AM

## 2023-10-23 NOTE — PROGRESS NOTES
[facility] Neurology Follow Up Visit    Carlos Ch MRN# [id number]   Age: 78 year old YOB: 1945     Brief history of symptoms: The patient was initially seen in neurologic consultation on 10/10/2022 and most recently 6/12/2023 for evaluation of imbalance. Please see the comprehensive neurologic consultation notes from those dates in the Epic records for details.     Prior MRI brain 12/29/2022 was unrevealing, but at our last visit his symptoms of parkinsonism seemed to have worsened while his neuropathic symptoms were stable. His prior trial of sinemet 10/20/2022 led to vertigo.  He was to be seen with Movement disorder specialist for further evaluation, as I suspected his parkinsonism may be leading to dysautonomia.    The patient is followed with Dr. Walton and was recently seen 8/23/2023 in regards to Parkinsonsim with suspicion for PSP.     Interval history:   - Seen with [facility] 9/28/2023 neurology providers for autonomic failure.     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  - EMG done through [facility] 10/20/2023 showed mild length dependent peripheral axonal neuropathy  - FDG PET on 10/20/2023 was possibly suggestive for mild FDG activity localizing in the posterior prostate gland.  There was no evidence for sarcoidosis or malignancy otherwise.  - MRI whole spine was recommended but I do not see imaging done  - Midodrine as managed through his PCP was recommended  - Movement disorder referral was recommended    Today, the patient doesn't notice any cognitive or behavioral changes.  His wife feels that there are changes with his memory and/or personality.  He used to be on-top of things, but recently he is less interested in yard-work (was prideful of this in the past).  She  worries they are both depressed, as their social life has become doctors appointments and worry.     The patient did open up today about his fears, and want for a diagnosis.  He does worry about his wife and her ability to help him, and doesn't want to be a burden.  He expressed guilt to some degree about his health.  He agrees with her about his lack of want to do things at times, even though he liked doing those things in the past.     Physical Exam:   Vitals: /86 (BP Location: Right arm, Patient Position: Sitting)    General: Seated comfortably in no acute distress.  HEENT: limited blink rate.  Emotive at times when in the right context, otherwise somewhat blank facies.  Neurologic:     Mental Status: Fully alert, attentive and oriented. Speech clear and fluent, no paraphasic errors.     Cranial Nerves: EOMI with normal smooth pursuit. Facial movements symmetric. Hearing not formally tested but intact to conversation.  No dysarthria but hypophonic vocal production.      Motor: No tremors noted at rest today, other abnormal movements observed.      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  We discussed that numbness isn't well treated with most neuropathic agents, which often are good for dysesthesias and not so much for numbness.  He wasn't interested in trialing medications at this time, but would let me know if he would want to in the future.      Otherwise, we spent much of today going over prior evaluations from here and the [facility], and what the current idea is for his condition.  He expressed understanding at this point, but we also spoke of the behavioral changes that can accompany any major health condition.  He was open to speaking with a therapist, as was his  wife.  They also felt alone, so-to-speak, in that they weren't signed up for MyChart and weren't knowledgeable about prior opinions and next steps in management. I will get them some help with setting MyChart up today, and asked them to contact Dr. Walton and the movement disorder team to see if there are local groups (support groups, PD groups) where the patient and his wife could get involved with.      If psychotherapy isn't helpful, I did ask that he consider an anti-depressant in the future.    Plan:  Mental health (psychology)  Aide patient in getting MyChart    Follow up in Neurology clinic in 6 months PRN (if neuropathy changes), or earlier as needed should new concerns arise.    YORDY Gusman D.O.   of Neurology    Total time today (35 min) in this patient encounter was spent on pre-charting, counseling and/or coordination of care.

## 2023-10-24 ENCOUNTER — VIRTUAL VISIT (OUTPATIENT)
Dept: PSYCHOLOGY | Facility: CLINIC | Age: 78
End: 2023-10-24
Payer: MEDICARE

## 2023-10-24 DIAGNOSIS — F43.21 ADJUSTMENT DISORDER WITH DEPRESSED MOOD: Primary | ICD-10-CM

## 2023-10-24 PROCEDURE — 90791 PSYCH DIAGNOSTIC EVALUATION: CPT | Mod: 95

## 2023-10-24 ASSESSMENT — ANXIETY QUESTIONNAIRES
3. WORRYING TOO MUCH ABOUT DIFFERENT THINGS: SEVERAL DAYS
4. TROUBLE RELAXING: NOT AT ALL
GAD7 TOTAL SCORE: 2
2. NOT BEING ABLE TO STOP OR CONTROL WORRYING: NOT AT ALL
6. BECOMING EASILY ANNOYED OR IRRITABLE: NOT AT ALL
5. BEING SO RESTLESS THAT IT IS HARD TO SIT STILL: NOT AT ALL
7. FEELING AFRAID AS IF SOMETHING AWFUL MIGHT HAPPEN: NOT AT ALL
1. FEELING NERVOUS, ANXIOUS, OR ON EDGE: SEVERAL DAYS
GAD7 TOTAL SCORE: 2

## 2023-10-24 ASSESSMENT — COLUMBIA-SUICIDE SEVERITY RATING SCALE - C-SSRS
1. HAVE YOU WISHED YOU WERE DEAD OR WISHED YOU COULD GO TO SLEEP AND NOT WAKE UP?: NO
TOTAL  NUMBER OF INTERRUPTED ATTEMPTS LIFETIME: NO
2. HAVE YOU ACTUALLY HAD ANY THOUGHTS OF KILLING YOURSELF?: NO
ATTEMPT LIFETIME: NO
TOTAL  NUMBER OF ABORTED OR SELF INTERRUPTED ATTEMPTS LIFETIME: NO
6. HAVE YOU EVER DONE ANYTHING, STARTED TO DO ANYTHING, OR PREPARED TO DO ANYTHING TO END YOUR LIFE?: NO

## 2023-10-24 ASSESSMENT — PATIENT HEALTH QUESTIONNAIRE - PHQ9: SUM OF ALL RESPONSES TO PHQ QUESTIONS 1-9: 8

## 2023-10-24 NOTE — CONFIDENTIAL NOTE
[facility]      PATIENT'S NAME: Carlos Ch  PREFERRED NAME: Carlos  PRONOUNS: he/him/his     MRN: [id number]  : 1945  ADDRESS: [address]  [facility] [address]  [facility]  [id number]  DATE OF SERVICE: 10/24/23  START TIME: 11:03 AM  END TIME: 11:55 AM  PREFERRED PHONE: [phone]  May we leave a program related message: Yes  SERVICE MODALITY:  Video Visit:      Provider verified identity through the following two step process.  Patient provided:  Patient  and Patient address    Telemedicine Visit: The patient's condition can be safely assessed and treated via synchronous audio and visual telemedicine encounter.      Reason for Telemedicine Visit: Patient has requested telehealth visit    Originating Site (Patient Location): Patient's home    Distant Site (Provider Location): Provider Remote Setting- Home Office    Consent:  The patient/guardian has verbally consented to: the potential risks and benefits of telemedicine (video visit) versus in person care; bill my insurance or make self-payment for services provided; and responsibility for payment of non-covered services.     Patient would like the video invitation sent by:  Send to e-mail at: [email or web address]    Mode of Communication:  Video Conference via [facility]    Distant Location (Provider):  Off-site    As the provider I attest to compliance with applicable laws and regulations related to telemedicine.    UNIVERSAL ADULT Mental Health DIAGNOSTIC ASSESSMENT    Identifying Information:  Patient is a 78 year old,    individual.  Patient was referred for an assessment by  Marlo Gusman D.O. and his wife .  Patient attended the session alone.    Chief Complaint:   The reason for seeking services at this time is: Carlos reports coming to therapy at the recommendation of his neurologist after noticing a decrease in mood, lack of interest, apathy and worry about future diagnosis. Carlos reports he has  "been worry about being a burden to his wife and daughter as they have been driving him to all his appointments and taken on more of his responsibilities. He also notes that since moving to [place] two years ago he has been social isolated as connecting with others has been more difficult. The problem(s) began 2021. Patient has not attempted to resolve these concerns in the past.    Social/Family History:  Patient reported they grew up in  [place] .  They were raised by biological parents.  Parents stayed . He is the second eldest with 3 brothers and 1 sister.     Patient reported that their childhood was \"it was a good childhood, we moved around a little bit for my dad's job but just small town living was great.  Patient described their current relationships with family of origin as \"good I talk with each other them.\"      The patient describes their cultural background as White.  Cultural influences and impact on patient's life structure, values, norms, and healthcare: None.  Contextual influences on patient's health include: None.  Cultural, Contextual, and socioeconomic factors do not affect the patient's access to services.  These factors will be addressed in the Preliminary Treatment plan.  Patient identified their preferred language to be English. Patient reported they do not  need the assistance of an  or other support involved in therapy.     Patient reported had no significant delays in developmental tasks.   Patient's highest education level was college graduate. Patient identified the following learning problems: none reported.  Modifications will not be used to assist communication in therapy.   Patient reports they are  able to understand written materials.    Patient reported the following relationship history 1.  Patient's current relationship status is  for 57 years.   Patient identified their sexual orientation as heterosexual.  Patient reported having four child(gela). " Patient identified siblings, adult child, and spouse as part of their support system.  Patient identified the quality of these relationships as stable and meaningful.     Patient's current living/housing situation involves staying in own home/apartment.  They live with his wife and they report that housing is stable.     Patient is currently retired.  Patient reports their finances are obtained through  detention and Social Security .  Patient does not identify finances as a current stressor.      Patient reported that they have not been involved with the legal system.  Patient denies being on probation / parole / under the jurisdiction of the court.    Patient's Strengths and Limitations:  Patient identified the following strengths or resources that will help them succeed in treatment: Gnosticist / Quaker, commitment to health and well being, friends / good social support, family support, and intelligence. Things that may interfere with the patient's success in treatment include: none identified.     Assessments:  The following assessments were completed by patient for this visit:  PHQ2:       8/23/2023     2:11 PM 10/10/2022    10:52 AM   PHQ-2 ( [facility] [employer])   Q1: Little interest or pleasure in doing things 2 3   Q2: Feeling down, depressed or hopeless 0 0   PHQ-2 Score 2 3     PHQ9:       10/10/2022    10:52 AM 10/24/2023    11:00 AM   PHQ-9 SCORE   PHQ-9 Total Score 14 8     GAD2:        No data to display              GAD7:       10/24/2023    11:00 AM   MARTINA-7 SCORE   Total Score 2     CAGE-AID:       10/24/2023    11:00 AM   CAGE-AID Total Score   Total Score 0     PROMIS 10-Global Health (all questions and answers displayed):       10/24/2023    11:00 AM   PROMIS 10   In general, would you say your health is: 2   In general, would you say your quality of life is: 2   In general, how would you rate your physical health? 2   In general, how would you rate your mental health, including your mood and your ability  to think? 2   In general, how would you rate your satisfaction with your social activities and relationships? 2   In general, please rate how well you carry out your usual social activities and roles. (This includes activities at home, at work and in your community, and responsibilities as a parent, child, spouse, employee, friend, etc.) 2   To what extent are you able to carry out your everyday physical activities such as walking, climbing stairs, carrying groceries, or moving a chair? 2   In the past 7 days, how often have you been bothered by emotional problems such as feeling anxious, depressed, or irritable? 2   In the past 7 days, how would you rate your fatigue on average? 4   In the past 7 days, how would you rate your pain on average, where 0 means no pain, and 10 means worst imaginable pain? 0   Global Mental Health Score 10   Global Physical Health Score 11   PROMIS TOTAL - SUBSCORES 21     [place] Suicide Severity Rating Scale (Lifetime/Recent)      10/24/2023    11:41 AM   [place] Suicide Severity Rating (Lifetime/Recent)   Q1 Wish to be Dead (Lifetime) N   Q2 Non-Specific Active Suicidal Thoughts (Lifetime) N   Actual Attempt (Lifetime) N   Has subject engaged in non-suicidal self-injurious behavior? (Lifetime) N   Interrupted Attempts (Lifetime) N   Aborted or Self-Interrupted Attempt (Lifetime) N   Preparatory Acts or Behavior (Lifetime) N   Calculated C-SSRS Risk Score (Lifetime/Recent) No Risk Indicated       Personal and Family Medical History:  Patient does not report a family history of mental health concerns.  Patient reports family history includes Coronary Artery Disease in his brother; Diabetes in his brother..     Patient does not report Mental Health Diagnosis or Treatment.      Patient has had a physical exam to rule out medical causes for current symptoms.  Date of last physical exam was within the past year. Client was encouraged to follow up with PCP if symptoms were to develop. The  patient has a Primary Care Provider, who is named [facility]..  Patient reports the following current medical concerns:  Parkinsonism, high blood pressure, and autoimmune hepatitis  .  Patient denies any issues with pain..   There are not significant appetite / nutritional concerns / weight changes. These may include: no concerns. Patient reports the following sleep concerns:  getting up to go to the bathroom at least 4 times a night.   Patient does not report a history of head injury / trauma / cognitive impairment, yet review of his medical records suggest there maybe changes in his cognition due to  Parkinsonism.    Patient reports not taking any current psychotropic medications    Medication Adherence:  Patient reports taking other prescribed medications as prescribed.    Patient Allergies:    Allergies   Allergen Reactions    Clopidogrel Rash    Celecoxib Rash    Sulfa Antibiotics Rash       Medical History:    Past Medical History:   Diagnosis Date    Tachy-wesly syndrome (H)          Current Mental Status Exam:   Appearance:  Appropriate    Eye Contact:  Good   Psychomotor:  Neck tic       Gait / station:  Client sat during the session  Attitude / Demeanor: Cooperative   Speech      Rate / Production: Normal/ Responsive Monotone       Volume:  Normal  volume      Language:  intact and no problems  Mood:   Depressed   Affect:   Flat    Thought Content: Clear   Thought Process: Coherent  Logical       Associations: No loosening of associations  Insight:   Fair   Judgment:  Intact   Orientation:  All  Attention/concentration: Good    Substance Use:  Patient did not report a family history of substance use concerns; see medical history section for details.  Patient has not received chemical dependency treatment in the past.  Patient has not ever been to detox.      Patient is not currently receiving any chemical dependency treatment. Patient reported the following problems as a result of their  "substance use:  None.    Patient denies using alcohol.  Patient denies using tobacco.  Patient denies using cannabis.  Patient reports using caffeine 3 times per week and drinks 1 at a time. Patient started using caffeine at age \"a younger man.\"  Patient reports using/abusing the following substance(s). Patient reported no other substance use.     Substance Use: No symptoms    Based on the negative CAGE score and clinical interview there  are not indications of drug or alcohol abuse.    Significant Losses / Trauma / Abuse / Neglect Issues:   Patient  did not serve in the .  There are indications or report of significant loss, trauma, abuse or neglect issues related to: are no indications and client denies any losses, trauma, abuse, or neglect concerns.  Concerns for possible neglect are not present.     Safety Assessment:   Patient denies current homicidal ideation and behaviors.  Patient denies current self-injurious ideation and behaviors.    Patient denied risk behaviors associated with substance use.  Patient denies any high risk behaviors associated with mental health symptoms.  Patient reports the following current concerns for their personal safety: None.  Patient reports there   firearms in the house.       There are no firearms in the home..    History of Safety Concerns:  Patient denied a history of homicidal ideation.     Patient denied a history of personal safety concerns.    Patient denied a history of assaultive behaviors.    Patient denied a history of sexual assault behaviors.     Patient denied a history of risk behaviors associated with substance use.  Patient denies any history of high risk behaviors associated with mental health symptoms.  Patient reports the following protective factors:      Risk Plan:  See Recommendations for Safety and Risk Management Plan    Review of Symptoms per patient report:   Depression: Lack of interest, Excessive or inappropriate guilt, Change in energy " level, Psychomotor slowing or agitation, Feelings of helplessness, Low self-worth, Ruminations, Feeling sad, down, or depressed, and Withdrawn  [place]:  No Symptoms  Psychosis: No Symptoms  Anxiety: Excessive worry  Panic:  No symptoms  Post Traumatic Stress Disorder:  No Symptoms   Eating Disorder: No Symptoms  ADD / ADHD:  No symptoms  Conduct Disorder: No symptoms  Autism Spectrum Disorder: No symptoms  Obsessive Compulsive Disorder: No Symptoms    Patient reports the following compulsive behaviors and treatment history: None.      Diagnostic Criteria:   Adjustment Disorder  A. The development of emotional or behavioral symptoms in response to an identifiable stressor(s) occurring within 3 months of the onset of the stressor(s)  B. These symptoms or behaviors are clinically significant, as evidenced by one or both of the following:       - Significant impairment in social, occupational, or other important areas of functioning  C. The stress-related disturbance does not meet criteria for another disorder & is not not an exacerbation of another mental disorder  D. The symptoms do not represent normal bereavement  E. Once the stressor or its consequences have terminated, the symptoms do not persist for more than an additional 6 months       * Adjustment Disorder with Depressed Mood: The predominant manifestations are symptoms such as low mood, tearfulness, or feelings of hopelessness    Functional Status:  Patient reports the following functional impairments:  chronic disease management, relationship(s), self-care, and social interactions.     Nonprogrammatic care:  Patient is requesting basic services to address current mental health concerns.    Clinical Summary:  1. Reason for assessment: worsening low mood and helplessness.  2. Psychosocial, Cultural and Contextual Factors: 78yr  male that is currently working towards a diagnosis with his Neurology team, Wife and daughter are primary supports, limited  social connections within [place].  3. Principal DSM5 Diagnoses  (Sustained by DSM5 Criteria Listed Above):   Adjustment Disorders  309.0 (F43.21) With depressed mood.  4. Other Diagnoses that is relevant to services:   None.  5. Provisional Diagnosis:  None.  6. Prognosis: Expect Improvement.  7. Likely consequences of symptoms if not treated: Decline in functioning if not treated.  8. Client strengths include:  caring, intelligent, responsible parent, and support of family, friends and providers .     Recommendations:     1. Plan for Safety and Risk Management:   Safety and Risk: Recommended that patient call 911 or go to the local ED should there be a change in any of these risk factors..          Report to child / adult protection services was NA.     2. Patient's identified mental health concerns with a cultural influence will be addressed by exploring and acknowledging the biopsychosocial factors that have directly impacted every aspect of their life and their mental health and incorporating the values they hold into treatment .     3. Initial Treatment will focus on:    Depressed Mood - to decrease the frequency and duration of their low mood and increase coping skills .     4. Resources/Service Plan:    services are not indicated.   Modifications to assist communication are not indicated.   Additional disability accommodations are not indicated.      5. Collaboration:   Collaboration / coordination of treatment will be initiated with the following  support professionals: None.      6.  Referrals:   The following referral(s) will be initiated: None. Next Scheduled Appointment: 11/6/2023.      A Release of Information has been obtained for the following: None.     Clinical Substantiation/medical necessity for the above recommendations:  n/a.    7. CASSANDRA:    CASSANDRA:  Discussed the general effects of drugs and alcohol on health and well-being. Provider gave patient printed information about the  effects  of chemical use on their health and well being. Recommendations:  Continue to limit use.     8. Records:   These were not available for review at time of assessment.   Information in this assessment was obtained from the medical record and  provided by patient who is a good historian.    Patient will have open access to their mental health medical record.    9.   Interactive Complexity: No    10. Safety Plan:  Patient denied any current/recent/lifetime history of suicidal ideation and/or behaviors.  No safety plan indicated at this time.     Provider Name/ Credentials:  Abby ANDINO, LICSW  October 24, 2023

## 2023-10-30 NOTE — PROGRESS NOTES
[facility]      PATIENT'S NAME: Carlso Ch  PREFERRED NAME: Carlos  PRONOUNS: he/him/his     MRN: [id number]  : 1945  ADDRESS: [address]  [facility] [address]  [id number]  DATE OF SERVICE: 10/24/23  START TIME: 11:03 AM  END TIME: 11:55 AM  PREFERRED PHONE: [phone]  May we leave a program related message: Yes  SERVICE MODALITY:  Video Visit:      Provider verified identity through the following two step process.  Patient provided:  Patient  and Patient address    Telemedicine Visit: The patient's condition can be safely assessed and treated via synchronous audio and visual telemedicine encounter.      Reason for Telemedicine Visit: Patient has requested telehealth visit    Originating Site (Patient Location): Patient's home    Distant Site (Provider Location): Provider Remote Setting- Home Office    Consent:  The patient/guardian has verbally consented to: the potential risks and benefits of telemedicine (video visit) versus in person care; bill my insurance or make self-payment for services provided; and responsibility for payment of non-covered services.     Patient would like the video invitation sent by:  Send to e-mail at: [email or web address]    Mode of Communication:  Video Conference via [facility]    Distant Location (Provider):  Off-site    As the provider I attest to compliance with applicable laws and regulations related to telemedicine.    UNIVERSAL ADULT Mental Health DIAGNOSTIC ASSESSMENT    Identifying Information:  Patient is a 78 year old,    individual.  Patient was referred for an assessment by Marlo Gusman D.O. and his wife .  Patient attended the session alone.    Chief Complaint:   The reason for seeking services at this time is: Carlos reports coming to therapy at the recommendation of his neurologist after noticing a decrease in mood, lack of interest, apathy and worry about future diagnosis. Carlos reports he has  "been worry about being a burden to his wife and daughter as they have been driving him to all his appointments and taken on more of his responsibilities. He also notes that since moving to [place] two years ago he has been social isolated as connecting with others has been more difficult. The problem(s) began 2021. Patient has not attempted to resolve these concerns in the past.    Social/Family History:  Patient reported they grew up in [place].  They were raised by biological parents.  Parents stayed . He is the second eldest with 3 brothers and 1 sister.     Patient reported that their childhood was \"it was a good childhood, we moved around a little bit for my dad's job but just small town living was great.  Patient described their current relationships with family of origin as \"good I talk with each other them.\"      The patient describes their cultural background as White.  Cultural influences and impact on patient's life structure, values, norms, and healthcare: None.  Contextual influences on patient's health include: None.  Cultural, Contextual, and socioeconomic factors do not affect the patient's access to services.  These factors will be addressed in the Preliminary Treatment plan.  Patient identified their preferred language to be English. Patient reported they do not  need the assistance of an  or other support involved in therapy.     Patient reported had no significant delays in developmental tasks.   Patient's highest education level was college graduate. Patient identified the following learning problems: none reported.  Modifications will not be used to assist communication in therapy.   Patient reports they are  able to understand written materials.    Patient reported the following relationship history 1.  Patient's current relationship status is  for 57 years.   Patient identified their sexual orientation as heterosexual.  Patient reported having four child(gela). Patient " identified siblings, adult child, and spouse as part of their support system.  Patient identified the quality of these relationships as stable and meaningful.     Patient's current living/housing situation involves staying in own home/apartment.  They live with his wife and they report that housing is stable.     Patient is currently retired.  Patient reports their finances are obtained through skilled nursing and Social Security.  Patient does not identify finances as a current stressor.      Patient reported that they have not been involved with the legal system.  Patient denies being on probation / parole / under the jurisdiction of the court.    Patient's Strengths and Limitations:  Patient identified the following strengths or resources that will help them succeed in treatment: Bahai / Restoration, commitment to health and well being, friends / good social support, family support, and intelligence. Things that may interfere with the patient's success in treatment include: none identified.     Assessments:  The following assessments were completed by patient for this visit:  PHQ2:       8/23/2023     2:11 PM 10/10/2022    10:52 AM   PHQ-2 ( [facility])   Q1: Little interest or pleasure in doing things 2 3   Q2: Feeling down, depressed or hopeless 0 0   PHQ-2 Score 2 3     PHQ9:       10/10/2022    10:52 AM 10/24/2023    11:00 AM   PHQ-9 SCORE   PHQ-9 Total Score 14 8     GAD2:        No data to display              GAD7:       10/24/2023    11:00 AM   MARTINA-7 SCORE   Total Score 2     CAGE-AID:       10/24/2023    11:00 AM   CAGE-AID Total Score   Total Score 0     PROMIS 10-Global Health (all questions and answers displayed):       10/24/2023    11:00 AM   PROMIS 10   In general, would you say your health is: 2   In general, would you say your quality of life is: 2   In general, how would you rate your physical health? 2   In general, how would you rate your mental health, including your mood and your ability to think?  2   In general, how would you rate your satisfaction with your social activities and relationships? 2   In general, please rate how well you carry out your usual social activities and roles. (This includes activities at home, at work and in your community, and responsibilities as a parent, child, spouse, employee, friend, etc.) 2   To what extent are you able to carry out your everyday physical activities such as walking, climbing stairs, carrying groceries, or moving a chair? 2   In the past 7 days, how often have you been bothered by emotional problems such as feeling anxious, depressed, or irritable? 2   In the past 7 days, how would you rate your fatigue on average? 4   In the past 7 days, how would you rate your pain on average, where 0 means no pain, and 10 means worst imaginable pain? 0   Global Mental Health Score 10   Global Physical Health Score 11   PROMIS TOTAL - SUBSCORES 21     [place] Suicide Severity Rating Scale (Lifetime/Recent)      10/24/2023    11:41 AM   [place] Suicide Severity Rating (Lifetime/Recent)   Q1 Wish to be Dead (Lifetime) N   Q2 Non-Specific Active Suicidal Thoughts (Lifetime) N   Actual Attempt (Lifetime) N   Has subject engaged in non-suicidal self-injurious behavior? (Lifetime) N   Interrupted Attempts (Lifetime) N   Aborted or Self-Interrupted Attempt (Lifetime) N   Preparatory Acts or Behavior (Lifetime) N   Calculated C-SSRS Risk Score (Lifetime/Recent) No Risk Indicated       Personal and Family Medical History:  Patient does not report a family history of mental health concerns.  Patient reports family history includes Coronary Artery Disease in his brother; Diabetes in his brother..     Patient does not report Mental Health Diagnosis or Treatment.      Patient has had a physical exam to rule out medical causes for current symptoms.  Date of last physical exam was within the past year. Client was encouraged to follow up with PCP if symptoms were to develop. The patient  has a Primary Care Provider, who is named [facility]..  Patient reports the following current medical concerns:  Parkinsonism, high blood pressure, and autoimmune hepatitis .  Patient denies any issues with pain..   There are not significant appetite / nutritional concerns / weight changes. These may include: no concerns. Patient reports the following sleep concerns:  getting up to go to the bathroom at least 4 times a night.   Patient does not report a history of head injury / trauma / cognitive impairment, yet review of his medical records suggest there maybe changes in his cognition due to  Parkinsonism.    Patient reports not taking any current psychotropic medications    Medication Adherence:  Patient reports taking other prescribed medications as prescribed.    Patient Allergies:    Allergies   Allergen Reactions    Clopidogrel Rash    Celecoxib Rash    Sulfa Antibiotics Rash       Medical History:    Past Medical History:   Diagnosis Date    Tachy-wesly syndrome (H)          Current Mental Status Exam:   Appearance:  Appropriate    Eye Contact:  Good   Psychomotor:  Neck tic       Gait / station:  Client sat during the session  Attitude / Demeanor: Cooperative   Speech      Rate / Production: Normal/ Responsive Monotone       Volume:  Normal  volume      Language:  intact and no problems  Mood:   Depressed   Affect:   Flat    Thought Content: Clear   Thought Process: Coherent  Logical       Associations: No loosening of associations  Insight:   Fair   Judgment:  Intact   Orientation:  All  Attention/concentration: Good    Substance Use:  Patient did not report a family history of substance use concerns; see medical history section for details.  Patient has not received chemical dependency treatment in the past.  Patient has not ever been to detox.      Patient is not currently receiving any chemical dependency treatment. Patient reported the following problems as a result of their substance  "use:  None.    Patient denies using alcohol.  Patient denies using tobacco.  Patient denies using cannabis.  Patient reports using caffeine 3 times per week and drinks 1 at a time. Patient started using caffeine at age \"a younger man.\"  Patient reports using/abusing the following substance(s). Patient reported no other substance use.     Substance Use: No symptoms    Based on the negative CAGE score and clinical interview there  are not indications of drug or alcohol abuse.    Significant Losses / Trauma / Abuse / Neglect Issues:   Patient  did not serve in the .  There are indications or report of significant loss, trauma, abuse or neglect issues related to: are no indications and client denies any losses, trauma, abuse, or neglect concerns.  Concerns for possible neglect are not present.     Safety Assessment:   Patient denies current homicidal ideation and behaviors.  Patient denies current self-injurious ideation and behaviors.    Patient denied risk behaviors associated with substance use.  Patient denies any high risk behaviors associated with mental health symptoms.  Patient reports the following current concerns for their personal safety: None.  Patient reports there   firearms in the house.       There are no firearms in the home..    History of Safety Concerns:  Patient denied a history of homicidal ideation.     Patient denied a history of personal safety concerns.    Patient denied a history of assaultive behaviors.    Patient denied a history of sexual assault behaviors.     Patient denied a history of risk behaviors associated with substance use.  Patient denies any history of high risk behaviors associated with mental health symptoms.  Patient reports the following protective factors:      Risk Plan:  See Recommendations for Safety and Risk Management Plan    Review of Symptoms per patient report:   Depression: Lack of interest, Excessive or inappropriate guilt, Change in energy level, " Psychomotor slowing or agitation, Feelings of helplessness, Low self-worth, Ruminations, Feeling sad, down, or depressed, and Withdrawn  [place]:  No Symptoms  Psychosis: No Symptoms  Anxiety: Excessive worry  Panic:  No symptoms  Post Traumatic Stress Disorder:  No Symptoms   Eating Disorder: No Symptoms  ADD / ADHD:  No symptoms  Conduct Disorder: No symptoms  Autism Spectrum Disorder: No symptoms  Obsessive Compulsive Disorder: No Symptoms    Patient reports the following compulsive behaviors and treatment history: None.      Diagnostic Criteria:   Adjustment Disorder  A. The development of emotional or behavioral symptoms in response to an identifiable stressor(s) occurring within 3 months of the onset of the stressor(s)  B. These symptoms or behaviors are clinically significant, as evidenced by one or both of the following:       - Significant impairment in social, occupational, or other important areas of functioning  C. The stress-related disturbance does not meet criteria for another disorder & is not not an exacerbation of another mental disorder  D. The symptoms do not represent normal bereavement  E. Once the stressor or its consequences have terminated, the symptoms do not persist for more than an additional 6 months       * Adjustment Disorder with Depressed Mood: The predominant manifestations are symptoms such as low mood, tearfulness, or feelings of hopelessness    Functional Status:  Patient reports the following functional impairments:  chronic disease management, relationship(s), self-care, and social interactions.     Nonprogrammatic care:  Patient is requesting basic services to address current mental health concerns.    Clinical Summary:  1. Reason for assessment: worsening low mood and helplessness.  2. Psychosocial, Cultural and Contextual Factors: 78yr  male that is currently working towards a diagnosis with his Neurology team, Wife and daughter are primary supports, limited social  connections within [place].  3. Principal DSM5 Diagnoses  (Sustained by DSM5 Criteria Listed Above):   Adjustment Disorders  309.0 (F43.21) With depressed mood.  4. Other Diagnoses that is relevant to services:   None.  5. Provisional Diagnosis:  None.  6. Prognosis: Expect Improvement.  7. Likely consequences of symptoms if not treated: Decline in functioning if not treated.  8. Client strengths include:  caring, intelligent, responsible parent, and support of family, friends and providers .     Recommendations:     1. Plan for Safety and Risk Management:   Safety and Risk: Recommended that patient call 911 or go to the local ED should there be a change in any of these risk factors..          Report to child / adult protection services was NA.     2. Patient's identified mental health concerns with a cultural influence will be addressed by exploring and acknowledging the biopsychosocial factors that have directly impacted every aspect of their life and their mental health and incorporating the values they hold into treatment.     3. Initial Treatment will focus on:    Depressed Mood - to decrease the frequency and duration of their low mood and increase coping skills.     4. Resources/Service Plan:    services are not indicated.   Modifications to assist communication are not indicated.   Additional disability accommodations are not indicated.      5. Collaboration:   Collaboration / coordination of treatment will be initiated with the following  support professionals: None.      6.  Referrals:   The following referral(s) will be initiated: None. Next Scheduled Appointment: 11/6/2023.      A Release of Information has been obtained for the following: None.     Clinical Substantiation/medical necessity for the above recommendations:  n/a.    7. CASSANDRA:    CASSANDRA:  Discussed the general effects of drugs and alcohol on health and well-being. Provider gave patient printed information about the  effects of  chemical use on their health and well being. Recommendations:  Continue to limit use.     8. Records:   These were not available for review at time of assessment.   Information in this assessment was obtained from the medical record and  provided by patient who is a good historian.    Patient will have open access to their mental health medical record.    9.   Interactive Complexity: No    10. Safety Plan:  Patient denied any current/recent/lifetime history of suicidal ideation and/or behaviors.  No safety plan indicated at this time.     Provider Name/ Credentials:  Abby ANDINO, LICSW  October 24, 2023

## 2023-11-06 ENCOUNTER — VIRTUAL VISIT (OUTPATIENT)
Dept: PSYCHOLOGY | Facility: CLINIC | Age: 78
End: 2023-11-06
Payer: MEDICARE

## 2023-11-06 DIAGNOSIS — F43.21 ADJUSTMENT DISORDER WITH DEPRESSED MOOD: Primary | ICD-10-CM

## 2023-11-06 PROCEDURE — 90834 PSYTX W PT 45 MINUTES: CPT | Mod: VID

## 2023-11-07 NOTE — PROGRESS NOTES
[facility]                                     Progress Note    Patient Name: Carlos Ch  Date: 11/6/2023         Service Type: Individual      Session Start Time: 4:00 PM  Session End Time: 4:45 PM     Session Length: 45 Minutes    Session #: 2    Attendees: Client attended alone    Service Modality:  Video Visit:      Provider verified identity through the following two step process.  Patient provided:  Patient is known previously to provider    Telemedicine Visit: The patient's condition can be safely assessed and treated via synchronous audio and visual telemedicine encounter.      Reason for Telemedicine Visit: Patient has requested telehealth visit    Originating Site (Patient Location): Patient's home    Distant Site (Provider Location): Provider Remote Setting- Home Office    Consent:  The patient/guardian has verbally consented to: the potential risks and benefits of telemedicine (video visit) versus in person care; bill my insurance or make self-payment for services provided; and responsibility for payment of non-covered services.     Patient would like the video invitation sent by:  My Chart    Mode of Communication:  Video Conference via [employer]    Distant Location (Provider):  Off-site    As the provider I attest to compliance with applicable laws and regulations related to telemedicine.    DATA  Interactive Complexity: No  Crisis: No        Progress Since Last Session (Related to Symptoms / Goals / Homework):   Symptoms: No change in his     Homework:  Newly Established      Episode of Care Goals: No improvement - PRECONTEMPLATION (Not seeing need for change); Intervened by educating the patient about the effects of current behavior on health.  Evoked information about reasons to continue behavior, express concern / recommendations, and explored any change talk     Current / Ongoing Stressors and Concerns:   Carlos reports coming to therapy at the recommendation of his  neurologist after noticing a decrease in mood, lack of interest, apathy and worry about future diagnosis. Carlos reports he has been worry about being a burden to his wife and daughter as they have been driving him to all his appointments and taken on more of his responsibilities. He also notes that since moving to [place] two years ago he has been social isolated as connecting with others has been more difficult.      Treatment Objective(s) Addressed in This Session:   Increase interest, engagement, and pleasure in doing things       Intervention:    Therapist utilized reflected listening as patient gave brief reflection of the past week. Patient reported continued worry about his . Therapist supported patient as he processed and validated patient. Therapist focused on obtaining more background information as well as relationship building. Therapist provided psychoeducation about therapy.     Assessments completed prior to visit:  The following assessments were completed by patient for this visit:  PHQ2:       11/6/2023     2:51 PM 8/23/2023     2:11 PM 10/10/2022    10:52 AM   PHQ-2 ( 1999 [employer])   Q1: Little interest or pleasure in doing things 1 2 3   Q2: Feeling down, depressed or hopeless 0 0 0   PHQ-2 Score 1 2 3   Q1: Little interest or pleasure in doing things Several days     Q2: Feeling down, depressed or hopeless Not at all     PHQ-2 Score 1       PHQ9:       10/10/2022    10:52 AM 10/24/2023    11:00 AM   PHQ-9 SCORE   PHQ-9 Total Score 14 8     GAD2:       11/3/2023    11:20 AM   MARTINA-2   Feeling nervous, anxious, or on edge 0   Not being able to stop or control worrying 0   MARTINA-2 Total Score 0     GAD7:       10/24/2023    11:00 AM   MARTINA-7 SCORE   Total Score 2     PROMIS 10-Global Health (all questions and answers displayed):       10/24/2023    11:00 AM   PROMIS 10   In general, would you say your health is: 2   In general, would you say your quality of life is: 2   In general, how would you rate  your physical health? 2   In general, how would you rate your mental health, including your mood and your ability to think? 2   In general, how would you rate your satisfaction with your social activities and relationships? 2   In general, please rate how well you carry out your usual social activities and roles. (This includes activities at home, at work and in your community, and responsibilities as a parent, child, spouse, employee, friend, etc.) 2   To what extent are you able to carry out your everyday physical activities such as walking, climbing stairs, carrying groceries, or moving a chair? 2   In the past 7 days, how often have you been bothered by emotional problems such as feeling anxious, depressed, or irritable? 2   In the past 7 days, how would you rate your fatigue on average? 4   In the past 7 days, how would you rate your pain on average, where 0 means no pain, and 10 means worst imaginable pain? 0   Global Mental Health Score 10   Global Physical Health Score 11   PROMIS TOTAL - SUBSCORES 21         ASSESSMENT: Current Emotional / Mental Status (status of significant symptoms):   Risk status (Self / Other harm or suicidal ideation)   Patient denies current fears or concerns for personal safety.   Patient denies current or recent suicidal ideation or behaviors.   Patient denies current or recent homicidal ideation or behaviors.   Patient denies current or recent self injurious behavior or ideation.   Patient denies other safety concerns.   Patient reports there has been no change in risk factors since their last session.     Patient reports there has been no change in protective factors since their last session.     Recommended that patient call 911 or go to the local ED should there be a change in any of these risk factors.     Appearance:   Appropriate    Eye Contact:   Good    Psychomotor Behavior: Normal  and some  Neck tics     Attitude:   Cooperative    Orientation:   All   Speech    Rate /  Production: Normal     Volume:  Normal    Mood:    Normal   Affect:    Flat    Thought Content:  Clear    Thought Form:  Coherent  Logical    Insight:    Fair      Medication Review:   No current psychiatric medications prescribed     Medication Compliance:   NA     Changes in Health Issues:   Yes: Parkinsonism     Chemical Use Review:   Substance Use: Chemical use reviewed, no active concerns identified      Tobacco Use: No current tobacco use.      Diagnosis:  1. Adjustment disorder with depressed mood        Collateral Reports Completed:   Not Applicable    PLAN: (Patient Tasks / Therapist Tasks / Other)  Carlos will return in 4 weeks.      Abby Kelly, [facility]  November 6, 2023

## 2024-02-14 ENCOUNTER — OFFICE VISIT (OUTPATIENT)
Dept: NEUROLOGY | Facility: CLINIC | Age: 79
End: 2024-02-14
Payer: MEDICARE

## 2024-02-14 VITALS — DIASTOLIC BLOOD PRESSURE: 81 MMHG | SYSTOLIC BLOOD PRESSURE: 121 MMHG | HEART RATE: 98 BPM

## 2024-02-14 DIAGNOSIS — G20.C ATYPICAL PARKINSONISM (H): Primary | ICD-10-CM

## 2024-02-14 DIAGNOSIS — I95.1 ORTHOSTATIC HYPOTENSION: ICD-10-CM

## 2024-02-14 PROCEDURE — 99215 OFFICE O/P EST HI 40 MIN: CPT | Performed by: PSYCHIATRY & NEUROLOGY

## 2024-02-14 RX ORDER — MIDODRINE HYDROCHLORIDE 2.5 MG/1
2.5 TABLET ORAL DAILY
Qty: 30 TABLET | Refills: 11 | Status: SHIPPED | OUTPATIENT
Start: 2024-02-14 | End: 2024-05-24

## 2024-02-14 NOTE — NURSING NOTE
Chief Complaint   Patient presents with    Follow Up     Parkinson       RYLEY Darling on 2/14/2024 at 8:50 AM

## 2024-02-14 NOTE — PROGRESS NOTES
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 The patient's main concern today is poor sleep due to frequent nocturnal urinary awakenings.    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 From an autonomic standpoint, he is prone for supine hypertension and orthostatic hypotension. Trial carbidopa/levodopa IR in the past and caused vertigo.   Axonal sensorimotor peripheral neuropathy, unclear etiology  Cognitive decline     Plan:   - Please obtain orthostatic vital signs and heart rate: check BP/HR with patient supine, then wait 1 minute and recheck BP/HR while sitting, then wait 1 minute and recheck BP/HR while standing. Do this for a week.  - Start midodrine 2.5 mg daily. Based on BP diary, will adjust timing of this medication and dose.  - Once BP is better controlled, will re-trial carbidopa/levodopa IR. Plan to start carbidopa/levodopa IR 25/100 mg 0.5 tabs three times daily and observe response.   - Physical Therapy for vestibular rehabilitation. Physical Therapy is necessary and Carlos will require this indefinitely.   - Encourage getting prostate issues and frequent urination managed   - Encouraged daily and safe exercise. Provide resources for Charlene Rocha.  - Future considerations: start a blood pressure lowering medication that is short-acting before bedtime such as losartan or nifedipine.     Patient to return in 3-5 months, for in-person, 30 minutes.     Sara Walton DO, MA   of Neurology   [facility]    Note dictated using voice recognition software.   54 minutes spent on date of encounter  "doing chart reviews and exam and documentation and further activities as noted above.        ------------------------------------------------------------------------------------------------------------      History of Present Illness  Mr. Ch is a pleasant 78 year old male that presents to Neurology Movement clinic for follow up regarding   Patient follows with Dr. Gusman for imbalance issues and was recently seen on 10/2023 where they discussed his mood and he agreed to a referral to psychology.     Interval History:   - Seen with [facility] 9/28/2023 neurology providers for autonomic failure.                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  - EMG done through [facility] 10/20/2023 showed mild length dependent peripheral axonal neuropathy  - FDG PET on 10/20/2023 was possibly suggestive for mild FDG activity localizing in the posterior prostate gland.  There was no evidence for sarcoidosis or malignancy otherwise.  - Vestibular testing showed central vestibular system involvement. Orthostatic catecholamines study showed low normal norepinephrine in the supine position which does not meaningfully increase with standing.   - Thermoregulatory sweat test showed widespread anhidrosis of the lateral arms and body below test.  - Autonomic reflex screen showed patchy postganglionic sympathetic sudomotor and severe cardiovascular adrenergic impairment with neurogenic orthostatic hypotension while cardiovagal function could not be assessed due to cardiac arrhythmia.     History obtained from patient. Patient is accompanied by Kiki, wife.  57 years. \"She has to have a lot of patience with me.\"   Main complaint: poor sleep, frequent nocturnal urination   Patient reports " "symptoms are \"bout the same. My movements are slowed. He has done Physical Therapy and would like it extended.   He stopped driving. He has a hard time feeling his feet.  Uses walker around the house and when going out.   Developed vertigo after starting carbidopa/levodopa IR thus stopped after a few days. Open to retrialing this medication slowly.   Mood: \"I'm okay. I get tired really easy and I find a lot of time in my recliner.   Apathy: \"I have lost my interest in reading. Vision is fine.\" Kiki describes apathy.   Autonomic: \"I don't get dizzy anymore.\"   Checking prostate.   He gets up often to use the restroom at night, gets up every 2-4 hours. Kiki states that he gets up more than that.     Review of Systems:  Other than that mentioned above, the remainder of 12 systems reviewed were negative.    PMH: unchanged  PSH: unchanged  FH: unchanged  SH: unchanged    Medications:  Current Outpatient Medications   Medication Sig Dispense Refill    acetaminophen (TYLENOL) 325 MG tablet Take 325-650 mg by mouth every 6 hours as needed for mild pain      aspirin 81 MG EC tablet Take 81 mg by mouth at bedtime      atorvastatin (LIPITOR) 40 MG tablet Take 40 mg by mouth At Bedtime      azaTHIOprine (IMURAN) 50 MG tablet Take 50 mg by mouth daily      calcium polycarbophil (FIBERCON) 625 MG tablet Take 2 tablets by mouth daily      finasteride (PROSCAR) 5 MG tablet Take 5 mg by mouth At Bedtime      lisinopril (ZESTRIL) 20 MG tablet Take 20 mg by mouth daily      metoprolol succinate ER (TOPROL-XL) 25 MG 24 hr tablet Take 12.5 mg by mouth daily      multivitamin w/minerals (THERA-VIT-M) tablet Take 1 tablet by mouth daily      ondansetron (ZOFRAN) 4 MG tablet Take by mouth every 8 hours as needed for nausea      oxyCODONE (ROXICODONE) 5 MG tablet Take 0.5-1 tablets (2.5-5 mg) by mouth every 6 hours as needed for severe pain 7 tablet 0    senna-docusate (SENOKOT-S/PERICOLACE) 8.6-50 MG tablet Take 1 tablet by mouth 2 times " "daily as needed for constipation      terazosin (HYTRIN) 1 MG capsule Take 1 mg by mouth At Bedtime      WARFARIN SODIUM PO 11/21/22 INR 2.65  Cont 5mg M-W-F and 7.5mg AOD.  Next INR 11/28/22.   11/16/22 INR 2.78  Take 5mg M-W-F and 7.5mg AOD.  Next INR 11/21/22.               Allergies   Allergen Reactions    Clopidogrel Rash    Celecoxib Rash    Sulfa Antibiotics Rash          Physical Exam:  The patient's  vitals were not taken for this visit.        Data Reviewed: I have personally reviewed the tests/studies below.       No results found for: \"A1C\"  TSH   Date Value Ref Range Status   03/28/2022 3.95 0.30 - 5.00 uIU/mL Final     CBC RESULTS:   Recent Labs   Lab Test 11/15/22  1150   WBC 6.4   RBC 4.67   HGB 14.7   HCT 45.2   MCV 97   MCH 31.5   MCHC 32.5   RDW 13.4   *     Last Comprehensive Metabolic Panel:  Sodium   Date Value Ref Range Status   11/15/2022 144 136 - 145 mmol/L Final     Potassium   Date Value Ref Range Status   11/15/2022 4.5 3.4 - 5.3 mmol/L Final     Comment:     Specimen slightly hemolyzed, potassium may be falsely elevated.   11/11/2022 3.8 3.5 - 5.0 mmol/L Final     Chloride   Date Value Ref Range Status   11/15/2022 108 (H) 98 - 107 mmol/L Final   11/11/2022 107 98 - 107 mmol/L Final     Carbon Dioxide (CO2)   Date Value Ref Range Status   11/15/2022 22 22 - 29 mmol/L Final   11/11/2022 27 22 - 31 mmol/L Final     Anion Gap   Date Value Ref Range Status   11/15/2022 14 7 - 15 mmol/L Final   11/11/2022 10 5 - 18 mmol/L Final     Glucose   Date Value Ref Range Status   11/15/2022 125 (H) 70 - 99 mg/dL Final   11/11/2022 100 70 - 125 mg/dL Final     Urea Nitrogen   Date Value Ref Range Status   11/15/2022 23.6 (H) 8.0 - 23.0 mg/dL Final   11/11/2022 16 8 - 28 mg/dL Final     Creatinine   Date Value Ref Range Status   11/15/2022 0.95 0.67 - 1.17 mg/dL Final     GFR Estimate   Date Value Ref Range Status   11/15/2022 82 >60 mL/min/1.73m2 Final     Comment:     Effective December 21, " 2021 eGFRcr in adults is calculated using the 2021 CKD-EPI creatinine equation which includes age and gender (Kimi et al., NEJ, DOI: 10.1056/FIREgv9929102)     Calcium   Date Value Ref Range Status   11/15/2022 9.9 8.8 - 10.2 mg/dL Final     Bilirubin Total   Date Value Ref Range Status   11/10/2022 0.8 0.0 - 1.0 mg/dL Final     Alkaline Phosphatase   Date Value Ref Range Status   11/10/2022 47 45 - 120 U/L Final     ALT   Date Value Ref Range Status   11/10/2022 13 0 - 45 U/L Final     AST   Date Value Ref Range Status   11/10/2022 20 0 - 40 U/L Final

## 2024-02-14 NOTE — LETTER
2024         RE: Carlos Ch  [address]  [facility] [address]        Dear Colleague,    Thank you for referring your patient, Carlos Ch, to the [facility]. Please see a copy of my visit note below.    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 The patient's main concern today is poor sleep due to frequent nocturnal urinary awakenings.    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 From an autonomic standpoint, he is prone for supine hypertension and orthostatic hypotension. Trial carbidopa/levodopa IR in the past and caused vertigo.   Axonal sensorimotor peripheral neuropathy, unclear etiology  Cognitive decline     Plan:   - Please obtain orthostatic vital signs and heart rate: check BP/HR with patient supine, then wait 1 minute and recheck BP/HR while sitting, then wait 1 minute and recheck BP/HR while standing. Do this for a week.  - Start midodrine 2.5 mg daily. Based on BP diary, will adjust timing of this medication and dose.  - Once BP is better controlled, will re-trial carbidopa/levodopa IR. Plan to start carbidopa/levodopa IR 25/100 mg 0.5 tabs three times daily and observe response.   - Physical Therapy for vestibular rehabilitation. Physical Therapy is necessary and Carlos will require this indefinitely.   - Encourage getting prostate issues and frequent urination managed   - Encouraged daily and safe exercise. Provide resources for Charlene Rocha.  - Future considerations: start a blood pressure lowering medication that is short-acting before bedtime such as  losartan or nifedipine.     Patient to return in 3-5 months, for in-person, 30 minutes.     Sara Walton DO, MA   of Neurology   [facility]    Note dictated using voice recognition software.   54 minutes spent on date of encounter doing chart reviews and exam and documentation and further activities as noted above.        ------------------------------------------------------------------------------------------------------------      History of Present Illness  Mr. Ch is a pleasant 78 year old male that presents to Neurology Movement clinic for follow up regarding   Patient follows with Dr. Gusman for imbalance issues and was recently seen on 10/2023 where they discussed his mood and he agreed to a referral to psychology.     Interval History:   - Seen with [facility] 9/28/2023 neurology providers for autonomic failure.                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  - EMG done through [facility] 10/20/2023 showed mild length dependent peripheral axonal neuropathy  - FDG PET on 10/20/2023 was possibly suggestive for mild FDG activity localizing in the posterior prostate gland.  There was no evidence for sarcoidosis or malignancy otherwise.  - Vestibular testing showed central vestibular system involvement. Orthostatic catecholamines study showed low normal norepinephrine in the supine position which does not meaningfully increase with standing.   - Thermoregulatory sweat test showed widespread anhidrosis of the lateral arms and body below test.  - Autonomic reflex screen showed patchy postganglionic sympathetic sudomotor and severe cardiovascular adrenergic impairment with neurogenic orthostatic hypotension while cardiovagal  "function could not be assessed due to cardiac arrhythmia.     History obtained from patient. Patient is accompanied by Kiki, wife.  57 years. \"She has to have a lot of patience with me.\"   Main complaint: poor sleep, frequent nocturnal urination   Patient reports symptoms are \"bout the same. My movements are slowed. He has done Physical Therapy and would like it extended.   He stopped driving. He has a hard time feeling his feet.  Uses walker around the house and when going out.   Developed vertigo after starting carbidopa/levodopa IR thus stopped after a few days. Open to retrialing this medication slowly.   Mood: \"I'm okay. I get tired really easy and I find a lot of time in my recliner.   Apathy: \"I have lost my interest in reading. Vision is fine.\" Kiki describes apathy.   Autonomic: \"I don't get dizzy anymore.\"   Checking prostate.   He gets up often to use the restroom at night, gets up every 2-4 hours. Kiki states that he gets up more than that.     Review of Systems:  Other than that mentioned above, the remainder of 12 systems reviewed were negative.    PMH: unchanged  PSH: unchanged  FH: unchanged  SH: unchanged    Medications:  Current Outpatient Medications   Medication Sig Dispense Refill     acetaminophen (TYLENOL) 325 MG tablet Take 325-650 mg by mouth every 6 hours as needed for mild pain       aspirin 81 MG EC tablet Take 81 mg by mouth at bedtime       atorvastatin (LIPITOR) 40 MG tablet Take 40 mg by mouth At Bedtime       azaTHIOprine (IMURAN) 50 MG tablet Take 50 mg by mouth daily       calcium polycarbophil (FIBERCON) 625 MG tablet Take 2 tablets by mouth daily       finasteride (PROSCAR) 5 MG tablet Take 5 mg by mouth At Bedtime       lisinopril (ZESTRIL) 20 MG tablet Take 20 mg by mouth daily       metoprolol succinate ER (TOPROL-XL) 25 MG 24 hr tablet Take 12.5 mg by mouth daily       multivitamin w/minerals (THERA-VIT-M) tablet Take 1 tablet by mouth daily       ondansetron (ZOFRAN) " "4 MG tablet Take by mouth every 8 hours as needed for nausea       oxyCODONE (ROXICODONE) 5 MG tablet Take 0.5-1 tablets (2.5-5 mg) by mouth every 6 hours as needed for severe pain 7 tablet 0     senna-docusate (SENOKOT-S/PERICOLACE) 8.6-50 MG tablet Take 1 tablet by mouth 2 times daily as needed for constipation       terazosin (HYTRIN) 1 MG capsule Take 1 mg by mouth At Bedtime       WARFARIN SODIUM PO 11/21/22 INR 2.65  Cont 5mg M-W-F and 7.5mg AOD.  Next INR 11/28/22.   11/16/22 INR 2.78  Take 5mg M-W-F and 7.5mg AOD.  Next INR 11/21/22.               Allergies   Allergen Reactions     Clopidogrel Rash     Celecoxib Rash     Sulfa Antibiotics Rash          Physical Exam:  The patient's  vitals were not taken for this visit.        Data Reviewed: I have personally reviewed the tests/studies below.       No results found for: \"A1C\"  TSH   Date Value Ref Range Status   03/28/2022 3.95 0.30 - 5.00 uIU/mL Final     CBC RESULTS:   Recent Labs   Lab Test 11/15/22  1150   WBC 6.4   RBC 4.67   HGB 14.7   HCT 45.2   MCV 97   MCH 31.5   MCHC 32.5   RDW 13.4   *     Last Comprehensive Metabolic Panel:  Sodium   Date Value Ref Range Status   11/15/2022 144 136 - 145 mmol/L Final     Potassium   Date Value Ref Range Status   11/15/2022 4.5 3.4 - 5.3 mmol/L Final     Comment:     Specimen slightly hemolyzed, potassium may be falsely elevated.   11/11/2022 3.8 3.5 - 5.0 mmol/L Final     Chloride   Date Value Ref Range Status   11/15/2022 108 (H) 98 - 107 mmol/L Final   11/11/2022 107 98 - 107 mmol/L Final     Carbon Dioxide (CO2)   Date Value Ref Range Status   11/15/2022 22 22 - 29 mmol/L Final   11/11/2022 27 22 - 31 mmol/L Final     Anion Gap   Date Value Ref Range Status   11/15/2022 14 7 - 15 mmol/L Final   11/11/2022 10 5 - 18 mmol/L Final     Glucose   Date Value Ref Range Status   11/15/2022 125 (H) 70 - 99 mg/dL Final   11/11/2022 100 70 - 125 mg/dL Final     Urea Nitrogen   Date Value Ref Range Status "   11/15/2022 23.6 (H) 8.0 - 23.0 mg/dL Final   11/11/2022 16 8 - 28 mg/dL Final     Creatinine   Date Value Ref Range Status   11/15/2022 0.95 0.67 - 1.17 mg/dL Final     GFR Estimate   Date Value Ref Range Status   11/15/2022 82 >60 mL/min/1.73m2 Final     Comment:     Effective December 21, 2021 eGFRcr in adults is calculated using the 2021 CKD-EPI creatinine equation which includes age and gender (Kimi et al., NEJ, DOI: 10.1056/NTRFsn5556250)     Calcium   Date Value Ref Range Status   11/15/2022 9.9 8.8 - 10.2 mg/dL Final     Bilirubin Total   Date Value Ref Range Status   11/10/2022 0.8 0.0 - 1.0 mg/dL Final     Alkaline Phosphatase   Date Value Ref Range Status   11/10/2022 47 45 - 120 U/L Final     ALT   Date Value Ref Range Status   11/10/2022 13 0 - 45 U/L Final     AST   Date Value Ref Range Status   11/10/2022 20 0 - 40 U/L Final                        Again, thank you for allowing me to participate in the care of your patient.        Sincerely,        Sara Walton DO

## 2024-02-14 NOTE — PATIENT INSTRUCTIONS
Plan:   - Please obtain orthostatic vital signs and heart rate: check BP/HR with patient supine, then wait 1 minute and recheck BP/HR while sitting, then wait 1 minute and recheck BP/HR while standing. Do this for a week.  -  Plan to start midodrine   - Once BP is better controlled, will re-trial carbidopa/levodopa IR. Plan to start carbidopa/levodopa IR 25/100 mg 0.5 tabs three times daily.  - Physical Therapy for vestibular rehabilitation. Physical Therapy is necessary and Carlos will require this indefinitely.   - Encourage getting prostate issues and frequent urination managed   - Encouraged daily and safe exercise. Provide resources for Charlene Rocha.  - Continue to monitor mood    Patient to return in 3-5 months, for in-person, 30 minutes.

## 2024-05-24 DIAGNOSIS — I95.1 ORTHOSTATIC HYPOTENSION: ICD-10-CM

## 2024-05-28 NOTE — TELEPHONE ENCOUNTER
Refill request for the following medication (s) listed below.    Pending Prescriptions:                       Disp   Refills    midodrine (PROAMATINE) 2.5 MG tablet      30 tab*11           Sig: Take 1 tablet (2.5 mg) by mouth daily      Last office visit provider:  2/14/24  Next appointment scheduled: 6/19/24      Medication T'd for review and signature  Adiel SIMON ATC on 5/28/2024 at 10:17 AM      Pt requesting Rx be sent to a different pharmacy

## 2024-05-31 RX ORDER — MIDODRINE HYDROCHLORIDE 2.5 MG/1
2.5 TABLET ORAL DAILY
Qty: 30 TABLET | Refills: 11 | Status: SHIPPED | OUTPATIENT
Start: 2024-05-31 | End: 2024-06-19

## 2024-06-19 ENCOUNTER — OFFICE VISIT (OUTPATIENT)
Dept: NEUROLOGY | Facility: CLINIC | Age: 79
End: 2024-06-19
Payer: MEDICARE

## 2024-06-19 VITALS — DIASTOLIC BLOOD PRESSURE: 84 MMHG | SYSTOLIC BLOOD PRESSURE: 138 MMHG | HEART RATE: 96 BPM

## 2024-06-19 DIAGNOSIS — G20.C ATYPICAL PARKINSONISM (H): Primary | ICD-10-CM

## 2024-06-19 DIAGNOSIS — R41.3 MEMORY DIFFICULTIES: ICD-10-CM

## 2024-06-19 PROCEDURE — G2211 COMPLEX E/M VISIT ADD ON: HCPCS | Performed by: PSYCHIATRY & NEUROLOGY

## 2024-06-19 PROCEDURE — 99214 OFFICE O/P EST MOD 30 MIN: CPT | Performed by: PSYCHIATRY & NEUROLOGY

## 2024-06-19 RX ORDER — CARBIDOPA AND LEVODOPA 25; 100 MG/1; MG/1
TABLET ORAL
Qty: 90 TABLET | Refills: 11 | Status: SHIPPED | OUTPATIENT
Start: 2024-06-19

## 2024-06-19 NOTE — PROGRESS NOTES
Department of Neurology  Movement Disorders Division     Patient: Carlos Ch   MRN: [id number]   : 1945   Date of Visit: 2024    Impression:  Carlos Ch is a 79 year old male with a neurodegenerative disorder characterized by parkinsonism, autonomic dysfunction, and cognitive decline. The patient's main concern today is frequent urination, fatigue, shuffling gait. We discussed the difference between Parkinson Disease and Atypical Parkinsonism and discussed management options for his symptoms. See plan below for details.     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 From an autonomic standpoint, he is prone for supine hypertension and orthostatic hypotension. Trial carbidopa/levodopa IR in the past and caused vertigo, however, unclear if this was connected and is willing to retrial this medication.   Axonal sensorimotor peripheral neuropathy, unclear etiology  Cognitive decline     Plan:   - Start Sinemet (carbidopa/levodopa) 25/100 mg IR as treatment for Parkinsonism. Use the following schedule to start Sinemet:  Sinemet (CD/LD) 25/100 mg IR AM Noon PM   Week 1                         0.5 tab 0.5 tab 0.5 tab   Week 2             1 1 1    - Take Sinemet 30 minutes before a protein rich meal time or 30 minutes afterward. This medication is best taken on an empty stomach.    - May stop at dose that improves symptoms. I would expect your symptoms of bradykinesia (slowness), neck stiffness, tremor, and, to some degree, postural instability or trouble walking  (shuffling). Freezing of gait may or may not respond to this medication.   - Common Side Effects discussed including: dizziness, nausea, constipation. If nausea or vomiting should occur, do not discontinue taking your medication and try taking with crackers or a non-protein snack (cracker, fruit). Also consider taking with  ginger-stefanie (real ginger) to help with nausea.  - For intolerable symptoms, return to a previous dose that was not associated with side effects or stop this medication.    - Iron may interfere with the effectiveness of this medication so do not take iron supplements at the same time you are taking Sinemet.    - Physical Therapy for Big program   - Encouraged daily and safe exercise. Provide resources for Charlene Rocha.  - Referral for neuropsychometric evaluation to identify patterns of memory deficits that could indicate a particular neurodegenerative process   - Continue following with Cardiologist and Urologist as needed  - Offered [employer], if needed and he will consider     Patient to return in 3-4 months, for in-person or virtual visit, 30 minutes.     Sara Walton, DO  Movement Disorders Specialist  [facility]     Note dictated using voice recognition software.   35 minutes spent on date of encounter doing chart reviews and exam and documentation and further activities as noted above.     The longitudinal plan of care for the diagnosis(es)/condition(s) as documented were addressed during this visit. Due to the added complexity in care, I will continue to support Carlos in the subsequent management and with ongoing continuity of care.       ------------------------------------------------------------------------------------------------------------      History of Present Illness  Mr. Ch is a pleasant 79 year old male that presents to Neurology Movement clinic for follow up regarding Atypical Parkinsonism.  The patient was initially seen in neurologic consultation on 8/23/23 and most recently 2/14/24 for management of Atypical Parkinsonism. Please see the comprehensive neurologic consultation notes from those dates in the Epic records for details.      History obtained from patient. Patient is accompanied by Blaire.  Main complaint: frequent urination   Patient reports reports  numbness in right hand in the day while sitting. He continues to have numbness in feet from knee down.   Orthostatic hypotension: He is not taking midodrine and was stopped by Cardiologist after Carlos's pacemaker went off and antihypertensive medications were also adjusted. He gets up carefully.   Balance/falls: Denies falls. He uses a walker. He still shuffles when walking.   Sleep: Gets up often to urinate.   Frequent nocturnal urination: Takes Tylenol prior to bed at 10 pm and gets up at 4 am. He has been told his bladder doesn't empty by Urology. He is up hourly from 2 am until he gets. He has tried 2 medications which didn't help. The third medication he couldn't afford. Not interested in surgeries.   Hallucinations: Denies   He no longer drives and license was taken away due to numbness in feet.   Will be  58 years in June 25.     - Seen with [facility] 9/28/2023 neurology providers for autonomic failure.                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  - EMG done through [facility] 10/20/2023 showed mild length dependent peripheral axonal neuropathy  - FDG PET on 10/20/2023 was possibly suggestive for mild FDG activity localizing in the posterior prostate gland.  There was no evidence for sarcoidosis or malignancy otherwise.  - Vestibular testing showed central vestibular system involvement. Orthostatic catecholamines study showed low normal norepinephrine in the supine position which does not meaningfully increase with standing.   - Thermoregulatory sweat test showed widespread anhidrosis of the lateral arms and body below test.  - Autonomic reflex screen showed patchy postganglionic sympathetic sudomotor and severe cardiovascular adrenergic impairment with  neurogenic orthostatic hypotension while cardiovagal function could not be assessed due to cardiac arrhythmia.     Review of Systems:  Other than that mentioned above, the remainder of 12 systems reviewed were negative.    PMH: unchanged  PSH: unchanged  FH: unchanged  SH: unchanged    Medications:  Current Outpatient Medications   Medication Sig Dispense Refill    acetaminophen (TYLENOL) 325 MG tablet Take 325-650 mg by mouth every 6 hours as needed for mild pain      atorvastatin (LIPITOR) 40 MG tablet Take 40 mg by mouth At Bedtime      azaTHIOprine (IMURAN) 50 MG tablet Take 50 mg by mouth daily      calcium polycarbophil (FIBERCON) 625 MG tablet Take 2 tablets by mouth daily      carbidopa-levodopa (SINEMET)  MG tablet Wk 1 take 0.5 tabs three times a day (TID). Wk 2 1 tab TID. Stop at dose that improves symptoms. 90 tablet 11    finasteride (PROSCAR) 5 MG tablet Take 5 mg by mouth At Bedtime      lisinopril (ZESTRIL) 10 MG tablet Take 10 mg by mouth daily      metoprolol succinate ER (TOPROL-XL) 25 MG 24 hr tablet Take 12.5 mg by mouth daily      multivitamin w/minerals (THERA-VIT-M) tablet Take 1 tablet by mouth daily      ondansetron (ZOFRAN) 4 MG tablet Take by mouth every 8 hours as needed for nausea      oxyCODONE (ROXICODONE) 5 MG tablet Take 0.5-1 tablets (2.5-5 mg) by mouth every 6 hours as needed for severe pain 7 tablet 0    senna-docusate (SENOKOT-S/PERICOLACE) 8.6-50 MG tablet Take 1 tablet by mouth 2 times daily as needed for constipation      terazosin (HYTRIN) 1 MG capsule Take 1 mg by mouth At Bedtime      WARFARIN SODIUM PO 11/21/22 INR 2.65  Cont 5mg M-W-F and 7.5mg AOD.  Next INR 11/28/22.   11/16/22 INR 2.78  Take 5mg M-W-F and 7.5mg AOD.  Next INR 11/21/22.               Allergies   Allergen Reactions    Clopidogrel Rash    Celecoxib Rash    Sulfa Antibiotics Rash          Physical Exam:  The patient's  blood pressure is 138/84 and his pulse is 96.    Physical Exam:  GENERAL: alert,  "active, attentive, appropriately groomed   HEENT: normocephalic, eyes open with no discharge, nares patent, oropharynx clear-no lesions  CHEST: non labored breathing, chest rise equal b/l  EXTREMITIES: no edema/cyanosis in extremities visible during exam  PSYCH: mood stable     Neurologic Exam:  MENTAL STATUS: Alert and oriented to person, place, time, and situation. Follows commands. Recent and remote memory intact. Attention span and concentration intact. Fund of knowledge intact to current events.   SPEECH: Fluent, intact comprehension and articulation, slightly hypophonic  CN: overall visual fields intact, facial movement symmetric, hearing grossly intact to conversation  MOTOR: Moves all extremities equally against gravity without difficulty     Data Reviewed: I have personally reviewed the tests/studies below.   No results found for: \"A1C\"  TSH   Date Value Ref Range Status   03/28/2022 3.95 0.30 - 5.00 uIU/mL Final     CBC RESULTS:   Recent Labs   Lab Test 11/15/22  1150   WBC 6.4   RBC 4.67   HGB 14.7   HCT 45.2   MCV 97   MCH 31.5   MCHC 32.5   RDW 13.4   *     Last Comprehensive Metabolic Panel:  Sodium   Date Value Ref Range Status   11/15/2022 144 136 - 145 mmol/L Final     Potassium   Date Value Ref Range Status   11/15/2022 4.5 3.4 - 5.3 mmol/L Final     Comment:     Specimen slightly hemolyzed, potassium may be falsely elevated.   11/11/2022 3.8 3.5 - 5.0 mmol/L Final     Chloride   Date Value Ref Range Status   11/15/2022 108 (H) 98 - 107 mmol/L Final   11/11/2022 107 98 - 107 mmol/L Final     Carbon Dioxide (CO2)   Date Value Ref Range Status   11/15/2022 22 22 - 29 mmol/L Final   11/11/2022 27 22 - 31 mmol/L Final     Anion Gap   Date Value Ref Range Status   11/15/2022 14 7 - 15 mmol/L Final   11/11/2022 10 5 - 18 mmol/L Final     Glucose   Date Value Ref Range Status   11/15/2022 125 (H) 70 - 99 mg/dL Final   11/11/2022 100 70 - 125 mg/dL Final     Urea Nitrogen   Date Value Ref Range " Status   11/15/2022 23.6 (H) 8.0 - 23.0 mg/dL Final   11/11/2022 16 8 - 28 mg/dL Final     Creatinine   Date Value Ref Range Status   11/15/2022 0.95 0.67 - 1.17 mg/dL Final     GFR Estimate   Date Value Ref Range Status   11/15/2022 82 >60 mL/min/1.73m2 Final     Comment:     Effective December 21, 2021 eGFRcr in adults is calculated using the 2021 CKD-EPI creatinine equation which includes age and gender (Kimi et al., NEJ, DOI: 10.1056/THAIfo2860258)     Calcium   Date Value Ref Range Status   11/15/2022 9.9 8.8 - 10.2 mg/dL Final     Bilirubin Total   Date Value Ref Range Status   11/10/2022 0.8 0.0 - 1.0 mg/dL Final     Alkaline Phosphatase   Date Value Ref Range Status   11/10/2022 47 45 - 120 U/L Final     ALT   Date Value Ref Range Status   11/10/2022 13 0 - 45 U/L Final     AST   Date Value Ref Range Status   11/10/2022 20 0 - 40 U/L Final

## 2024-06-19 NOTE — NURSING NOTE
Chief Complaint   Patient presents with    Follow Up       RYLEY Darling on 6/19/2024 at 9:36 AM     AOX4 +ambulatory patient reports 8 weeks pregnant complaining of vaginal spotting no abd pains

## 2024-06-19 NOTE — LETTER
2024      Carlos Ch  [address]  [place] [facility] [address]      Dear Colleague,    Thank you for referring your patient, Carlos Ch, to the [facility]. Please see a copy of my visit note below.    Department of Neurology  Movement Disorders Division     Patient: Carlos Ch   MRN: [id number]   : 1945   Date of Visit: 2024    Impression:  Carlos Ch is a 79 year old male with a neurodegenerative disorder characterized by parkinsonism, autonomic dysfunction, and cognitive decline. The patient's main concern today is frequent urination, fatigue, shuffling gait. We discussed the difference between Parkinson Disease and Atypical Parkinsonism and discussed management options for his symptoms. See plan below for details.     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 From an autonomic standpoint, he is prone for supine hypertension and orthostatic hypotension. Trial carbidopa/levodopa IR in the past and caused vertigo, however, unclear if this was connected and is willing to retrial this medication.   Axonal sensorimotor peripheral neuropathy, unclear etiology  Cognitive decline     Plan:   - Start Sinemet (carbidopa/levodopa) 25/100 mg IR as treatment for Parkinsonism. Use the following schedule to start Sinemet:  Sinemet (CD/LD) 25/100 mg IR AM Noon PM   Week 1                         0.5 tab 0.5 tab 0.5 tab   Week 2             1 1 1    - Take Sinemet 30 minutes before a protein rich meal time or 30 minutes afterward. This medication is best taken on an empty stomach.    - May stop at dose that improves symptoms. I would expect your symptoms of bradykinesia (slowness), neck stiffness, tremor, and, to some degree, postural instability or trouble walking  (shuffling). Freezing of gait may or may not respond to this  medication.   - Common Side Effects discussed including: dizziness, nausea, constipation. If nausea or vomiting should occur, do not discontinue taking your medication and try taking with crackers or a non-protein snack (cracker, fruit). Also consider taking with ginger-stefanie (real ginger) to help with nausea.  - For intolerable symptoms, return to a previous dose that was not associated with side effects or stop this medication.    - Iron may interfere with the effectiveness of this medication so do not take iron supplements at the same time you are taking Sinemet.    - Physical Therapy for Big program   - Encouraged daily and safe exercise. Provide resources for Charlene Rocha.  - Referral for neuropsychometric evaluation to identify patterns of memory deficits that could indicate a particular neurodegenerative process   - Continue following with Cardiologist and Urologist as needed  - Offered [employer], if needed and he will consider     Patient to return in 3-4 months, for in-person or virtual visit, 30 minutes.     Sara Walton,   Movement Disorders Specialist  [facility]     Note dictated using voice recognition software.   35 minutes spent on date of encounter doing chart reviews and exam and documentation and further activities as noted above.     The longitudinal plan of care for the diagnosis(es)/condition(s) as documented were addressed during this visit. Due to the added complexity in care, I will continue to support Carlos in the subsequent management and with ongoing continuity of care.       ------------------------------------------------------------------------------------------------------------      History of Present Illness  Mr. Ch is a pleasant 79 year old male that presents to Neurology Movement clinic for follow up regarding Atypical Parkinsonism.  The patient was initially seen in neurologic consultation on 8/23/23 and most recently 2/14/24 for management of  Atypical Parkinsonism. Please see the comprehensive neurologic consultation notes from those dates in the Epic records for details.      History obtained from patient. Patient is accompanied by Blaire.  Main complaint: frequent urination   Patient reports reports numbness in right hand in the day while sitting. He continues to have numbness in feet from knee down.   Orthostatic hypotension: He is not taking midodrine and was stopped by Cardiologist after Carlos's pacemaker went off and antihypertensive medications were also adjusted. He gets up carefully.   Balance/falls: Denies falls. He uses a walker. He still shuffles when walking.   Sleep: Gets up often to urinate.   Frequent nocturnal urination: Takes Tylenol prior to bed at 10 pm and gets up at 4 am. He has been told his bladder doesn't empty by Urology. He is up hourly from 2 am until he gets. He has tried 2 medications which didn't help. The third medication he couldn't afford. Not interested in surgeries.   Hallucinations: Denies   He no longer drives and license was taken away due to numbness in feet.   Will be  58 years in June 25.     - Seen with [facility] 9/28/2023 neurology providers for autonomic failure.                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  - EMG done through [facility] 10/20/2023 showed mild length dependent peripheral axonal neuropathy  - FDG PET on 10/20/2023 was possibly suggestive for mild FDG activity localizing in the posterior prostate gland.  There was no evidence for sarcoidosis or malignancy otherwise.  - Vestibular testing showed central vestibular system involvement. Orthostatic catecholamines study showed low normal norepinephrine in the supine position which does not meaningfully  increase with standing.   - Thermoregulatory sweat test showed widespread anhidrosis of the lateral arms and body below test.  - Autonomic reflex screen showed patchy postganglionic sympathetic sudomotor and severe cardiovascular adrenergic impairment with neurogenic orthostatic hypotension while cardiovagal function could not be assessed due to cardiac arrhythmia.     Review of Systems:  Other than that mentioned above, the remainder of 12 systems reviewed were negative.    PMH: unchanged  PSH: unchanged  FH: unchanged  SH: unchanged    Medications:  Current Outpatient Medications   Medication Sig Dispense Refill     acetaminophen (TYLENOL) 325 MG tablet Take 325-650 mg by mouth every 6 hours as needed for mild pain       atorvastatin (LIPITOR) 40 MG tablet Take 40 mg by mouth At Bedtime       azaTHIOprine (IMURAN) 50 MG tablet Take 50 mg by mouth daily       calcium polycarbophil (FIBERCON) 625 MG tablet Take 2 tablets by mouth daily       carbidopa-levodopa (SINEMET)  MG tablet Wk 1 take 0.5 tabs three times a day (TID). Wk 2 1 tab TID. Stop at dose that improves symptoms. 90 tablet 11     finasteride (PROSCAR) 5 MG tablet Take 5 mg by mouth At Bedtime       lisinopril (ZESTRIL) 10 MG tablet Take 10 mg by mouth daily       metoprolol succinate ER (TOPROL-XL) 25 MG 24 hr tablet Take 12.5 mg by mouth daily       multivitamin w/minerals (THERA-VIT-M) tablet Take 1 tablet by mouth daily       ondansetron (ZOFRAN) 4 MG tablet Take by mouth every 8 hours as needed for nausea       oxyCODONE (ROXICODONE) 5 MG tablet Take 0.5-1 tablets (2.5-5 mg) by mouth every 6 hours as needed for severe pain 7 tablet 0     senna-docusate (SENOKOT-S/PERICOLACE) 8.6-50 MG tablet Take 1 tablet by mouth 2 times daily as needed for constipation       terazosin (HYTRIN) 1 MG capsule Take 1 mg by mouth At Bedtime       WARFARIN SODIUM PO 11/21/22 INR 2.65  Cont 5mg M-W-F and 7.5mg AOD.  Next INR 11/28/22.   11/16/22 INR 2.78  Take 5mg  "M-W-F and 7.5mg AOD.  Next INR 11/21/22.               Allergies   Allergen Reactions     Clopidogrel Rash     Celecoxib Rash     Sulfa Antibiotics Rash          Physical Exam:  The patient's  blood pressure is 138/84 and his pulse is 96.    Physical Exam:  GENERAL: alert, active, attentive, appropriately groomed   HEENT: normocephalic, eyes open with no discharge, nares patent, oropharynx clear-no lesions  CHEST: non labored breathing, chest rise equal b/l  EXTREMITIES: no edema/cyanosis in extremities visible during exam  PSYCH: mood stable     Neurologic Exam:  MENTAL STATUS: Alert and oriented to person, place, time, and situation. Follows commands. Recent and remote memory intact. Attention span and concentration intact. Fund of knowledge intact to current events.   SPEECH: Fluent, intact comprehension and articulation, slightly hypophonic  CN: overall visual fields intact, facial movement symmetric, hearing grossly intact to conversation  MOTOR: Moves all extremities equally against gravity without difficulty     Data Reviewed: I have personally reviewed the tests/studies below.   No results found for: \"A1C\"  TSH   Date Value Ref Range Status   03/28/2022 3.95 0.30 - 5.00 uIU/mL Final     CBC RESULTS:   Recent Labs   Lab Test 11/15/22  1150   WBC 6.4   RBC 4.67   HGB 14.7   HCT 45.2   MCV 97   MCH 31.5   MCHC 32.5   RDW 13.4   *     Last Comprehensive Metabolic Panel:  Sodium   Date Value Ref Range Status   11/15/2022 144 136 - 145 mmol/L Final     Potassium   Date Value Ref Range Status   11/15/2022 4.5 3.4 - 5.3 mmol/L Final     Comment:     Specimen slightly hemolyzed, potassium may be falsely elevated.   11/11/2022 3.8 3.5 - 5.0 mmol/L Final     Chloride   Date Value Ref Range Status   11/15/2022 108 (H) 98 - 107 mmol/L Final   11/11/2022 107 98 - 107 mmol/L Final     Carbon Dioxide (CO2)   Date Value Ref Range Status   11/15/2022 22 22 - 29 mmol/L Final   11/11/2022 27 22 - 31 mmol/L Final "     Anion Gap   Date Value Ref Range Status   11/15/2022 14 7 - 15 mmol/L Final   11/11/2022 10 5 - 18 mmol/L Final     Glucose   Date Value Ref Range Status   11/15/2022 125 (H) 70 - 99 mg/dL Final   11/11/2022 100 70 - 125 mg/dL Final     Urea Nitrogen   Date Value Ref Range Status   11/15/2022 23.6 (H) 8.0 - 23.0 mg/dL Final   11/11/2022 16 8 - 28 mg/dL Final     Creatinine   Date Value Ref Range Status   11/15/2022 0.95 0.67 - 1.17 mg/dL Final     GFR Estimate   Date Value Ref Range Status   11/15/2022 82 >60 mL/min/1.73m2 Final     Comment:     Effective December 21, 2021 eGFRcr in adults is calculated using the 2021 CKD-EPI creatinine equation which includes age and gender (Kimi et al., NEJM, DOI: 10.1056/NXSSgg4034330)     Calcium   Date Value Ref Range Status   11/15/2022 9.9 8.8 - 10.2 mg/dL Final     Bilirubin Total   Date Value Ref Range Status   11/10/2022 0.8 0.0 - 1.0 mg/dL Final     Alkaline Phosphatase   Date Value Ref Range Status   11/10/2022 47 45 - 120 U/L Final     ALT   Date Value Ref Range Status   11/10/2022 13 0 - 45 U/L Final     AST   Date Value Ref Range Status   11/10/2022 20 0 - 40 U/L Final                        Again, thank you for allowing me to participate in the care of your patient.        Sincerely,        Sara Walton DO

## 2024-06-19 NOTE — PATIENT INSTRUCTIONS
Plan:   - Start Sinemet (carbidopa/levodopa) 25/100 mg IR as treatment for Parkinsonism. Use the following schedule to start Sinemet:  Sinemet (CD/LD) 25/100 mg IR AM Noon PM   Week 1                         0.5 tab 0.5 tab 0.5 tab   Week 2             1 1 1    - Take Sinemet 30 minutes before a protein rich meal time or 30 minutes afterward. This medication is best taken on an empty stomach.    - May stop at dose that improves symptoms. I would expect your symptoms of bradykinesia (slowness), neck stiffness, tremor, and, to some degree, postural instability or trouble walking  (shuffling). Freezing of gait may or may not respond to this medication.   - Common Side Effects discussed including: dizziness, nausea, constipation. If nausea or vomiting should occur, do not discontinue taking your medication and try taking with crackers or a non-protein snack (cracker, fruit). Also consider taking with ginger-stefanie (real ginger) to help with nausea.  - For intolerable symptoms, return to a previous dose that was not associated with side effects or stop this medication.    - Iron may interfere with the effectiveness of this medication so do not take iron supplements at the same time you are taking Sinemet.    - Physical Therapy for Big program   - Encouraged daily and safe exercise. Provide resources for Charlene Rocha.  - Referral for neuropsychometric evaluation to identify patterns of memory deficits that could indicate a particular neurodegenerative process   - Continue following with Cardiologist and Urologist as needed  - Offered [employer], if needed    Patient to return in 3-4 months, for in-person or virtual visit, 30 minutes.

## 2024-08-19 ENCOUNTER — THERAPY VISIT (OUTPATIENT)
Dept: PHYSICAL THERAPY | Facility: REHABILITATION | Age: 79
End: 2024-08-19
Attending: PSYCHIATRY & NEUROLOGY
Payer: MEDICARE

## 2024-08-19 DIAGNOSIS — G20.C ATYPICAL PARKINSONISM (H): Primary | ICD-10-CM

## 2024-08-19 PROCEDURE — 97110 THERAPEUTIC EXERCISES: CPT | Mod: GP

## 2024-08-19 PROCEDURE — 97162 PT EVAL MOD COMPLEX 30 MIN: CPT | Mod: GP

## 2024-08-19 NOTE — PROGRESS NOTES
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 Per chart review \"suspect a neurodegenerative disorder characterized by parkinsonism, autonomic dysfunction, and cognitive decline.\" Patient unsure of when his last fall was, however he does note that he did get hurt. He denies any episodes of freezing during ambulation but endorses festination type symptoms. He feels his strength and balance have declined and he would like to improve these with physical therapy.         Presenting condition or subjective complaint: parkinsonAtypical parkinsonism  Date of onset: 06/19/24 (referral date)    Relevant medical history:     Patient Active Problem List   Diagnosis    Abnormal LFTs    Anticoagulation monitoring, INR range 2-3    Arthritis    Autoimmune hepatitis (H)    Benign essential hypertension    BPH with obstruction/lower urinary tract symptoms    Chronic ITP (idiopathic thrombocytopenic purpura) (H)    Coronary atherosclerosis    Diverticulitis    Diverticulosis of colon    Erythrocytosis    Pacemaker    PAF (paroxysmal atrial fibrillation) (H)    Polyclonal gammopathy    Portal vein aneurysm    Pure hypercholesterolemia    Tachy-wesly syndrome (H)    Urinary urgency    Syncope    Closed nondisplaced fracture of pelvis, unspecified part of pelvis, initial encounter (H)    Iliac artery aneurysm, right (H24)     Dates & types of surgery:      Prior diagnostic imaging/testing results:       Prior therapy history for the same diagnosis, illness or injury:        Prior Level of Function  Transfers: Independent  Ambulation: Assistive equipment  ADL: Assistive equipment  IADL: Finances, Meal preparation, Medication management, wife assists with laundry and " housekeeping as well as finances, meals, and medication management    Living Environment  Social support: With a significant other or spouse   Type of home: House   Stairs to enter the home: None   Ramp: Yes   Stairs inside the home: No       Help at home: Home and Yard maintenance tasks; Assist for driving and community activities  Equipment owned: Walker     Patient goals for therapy: Walk    Pain assessment: Pain denied     Objective      Cognitive Status Examination  Orientation: Oriented to person, place and time   Level of Consciousness: Alert  Follows Commands and Answers Questions: 100% of the time  Personal Safety and Judgement: Impaired  Memory:  Patient noting no difficulty, wife not present for subjective hx     OBSERVATION: Patient ambulating with wheeled walker, shuffling step and stooped posture   INTEGUMENTARY: Intact  POSTURE:  Rounded shoulders and head  PALPATION: NT will assess as indicated  RANGE OF MOTION: LE ROM WFL  STRENGTH:  Grossly reduced BLE as noted by 5xSTS score     BED MOBILITY: Independent    TRANSFERS: Independent    WHEELCHAIR MOBILITY: NA    GAIT:   Level of [place]: Independent  Assistive Device(s): Walker (front wheeled)  Gait Deviations:  Patient with significantly decreased gait speed and step length with shuffling noted. He denies any freezing but does describe occasional festination type symptoms. Heavy reliance on walker during gait.   Gait Distance: within clinic  Stairs: NT will further assess as indicated    BALANCE:  MiniBest test to be completed at next session for formal dynamic, anticipatory, and reactive balance testing.  Pt demonstrates increased sway on mCTSIB with eyes closed and foam separately, unable to remain for more than 3 seconds with 5th condition. Likely heavily reliant on visual input for balance.     SPECIAL TESTS  10 Meter Walk Test (Comfortable)  0.30 m/s   10 Meter Walk Test (Fast)  0.35 m/s   5 Times Sit-to-Stand (5TSTS)  22.29 sec      Modified CTSIB Conditions (sec) Cond 1: 30  Cond 2: 30  Cond 4: 30  Cond 5 : 2.65   Mini-BESTest   NT on eval due to time constaints - will assess at next visit        SENSATION:  Endorses numbness/tingling in BLE up to knees, notes it may gradually be getting worse. He also endorses some new numbness in fingers.     COORDINATION: Bradykinesia with coordination tasks, though able to complete with minimal mistakes.     Assessment & Plan   CLINICAL IMPRESSIONS  Medical Diagnosis: G20.C (ICD-10-CM) - Atypical parkinsonism (H)    Treatment Diagnosis: Force production deficit, balance deficits, decreased activity tolerance.   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 He presents with decreased gait speed and other deviations including shuffling, increasing his risk for falls. Additionally, he identifies increased difficulty with fine motor skills and this PT has reached out to referring provider for OT referral.  The following significant findings have been identified: Decreased strength, Impaired balance, Impaired sensation, Impaired gait, Decreased activity tolerance, and Instability. These impairments interfere with their ability to perform self care tasks, recreational activities, household chores, household mobility, and community mobility as compared to previous level of function.     Clinical Decision Making (Complexity):  Clinical Presentation: Evolving/Changing  Clinical Presentation Rationale: based on medical and personal factors listed in PT evaluation  Clinical Decision Making (Complexity): Moderate complexity    PLAN OF CARE  Treatment Interventions:  Interventions: Gait Training, Manual Therapy, Neuromuscular Re-education, Therapeutic Activity, Therapeutic Exercise    Long Term Goals     PT Goal 1  Goal Identifier: HEP  Goal Description: Pt will be able to demonstrate HEP activities without need for cues from provider to demonstrate  understanding and independence with HEP.  Rationale: to maximize safety and independence with performance of ADLs and functional tasks;to maximize safety and independence within the home;to maximize safety and independence within the community  Target Date: 11/16/24  PT Goal 2  Goal Identifier: MiniBest  Goal Description: Pt will demonstrate a 6 point improvement on miniBEST test to demonstrate clinically significant difference in score to demonstrate improved safety with balance and decrease risk of falls.  Rationale: to maximize safety and independence within the home;to maximize safety and independence with performance of ADLs and functional tasks;to maximize safety and independence within the community  Target Date: 11/16/24  PT Goal 3  Goal Identifier: Gait speed  Goal Description: Pt will demonstrate a 0.12 second improvement in gait speed to demonstrate minimum clinically significant difference in score to demonstrate improved gait velocity.  Rationale: to maximize safety and independence with performance of ADLs and functional tasks;to maximize safety and independence within the home;to maximize safety and independence within the community  Target Date: 11/16/24  PT Goal 4  Goal Identifier: 5xSTS  Goal Description: Pt will demonstrate a 2.3 seconds improvement on 5xSTS to demonstrate minimum clinically significant difference in score to demonstrate improved LE strength.  Rationale: to maximize safety and independence with performance of ADLs and functional tasks;to maximize safety and independence within the home;to maximize safety and independence within the community  Target Date: 11/16/24      Frequency of Treatment: 2x/week  Duration of Treatment: 10 weeks    Recommended Referrals to Other Professionals: Occupational Therapy  Education Assessment:   Learner/Method: Patient;Demonstration;Listening  Education Comments: Pt verbalizes/demos understanding of education.    Risks and benefits of  evaluation/treatment have been explained.   Patient/Family/caregiver agrees with Plan of Care.     Evaluation Time:     PT Eval, Moderate Complexity Minutes (45209): 35     Signing Clinician: Leidy Tobin PT, DPT        [facility]                                                                                   OUTPATIENT PHYSICAL THERAPY      PLAN OF TREATMENT FOR OUTPATIENT REHABILITATION   Patient's Last Name, First Name, Carlos Pritchett YOB: 1945   Provider's Name   [facility]   Medical Record No.  [id number]     Onset Date: 06/19/24 (referral date)  Start of Care Date: 08/19/24     Medical Diagnosis:  G20.C (ICD-10-CM) - Atypical parkinsonism (H)      PT Treatment Diagnosis:  Force production deficit, balance deficits, decreased activity tolerance. Plan of Treatment  Frequency/Duration: 2x/week/ 10 weeks    Certification date from 08/19/24 to 11/16/24         See note for plan of treatment details and functional goals     Leidy Tobin PT, DPT                       I CERTIFY THE NEED FOR THESE SERVICES FURNISHED UNDER        THIS PLAN OF TREATMENT AND WHILE UNDER MY CARE     (Physician attestation of this document indicates review and certification of the therapy plan).              Referring Provider:  Sara Walton    Initial Assessment  See Epic Evaluation- Start of Care Date: 08/19/24

## 2024-08-21 DIAGNOSIS — R29.898 FINE MOTOR IMPAIRMENT: Primary | ICD-10-CM

## 2024-08-21 DIAGNOSIS — R29.818 FINE MOTOR IMPAIRMENT: Primary | ICD-10-CM

## 2024-08-21 DIAGNOSIS — R41.89 COGNITIVE DECLINE: ICD-10-CM

## 2024-08-22 ENCOUNTER — THERAPY VISIT (OUTPATIENT)
Dept: PHYSICAL THERAPY | Facility: REHABILITATION | Age: 79
End: 2024-08-22
Payer: MEDICARE

## 2024-08-22 DIAGNOSIS — G20.C ATYPICAL PARKINSONISM (H): Primary | ICD-10-CM

## 2024-08-22 PROCEDURE — 97112 NEUROMUSCULAR REEDUCATION: CPT | Mod: GP | Performed by: PHYSICAL THERAPIST

## 2024-08-22 PROCEDURE — 97110 THERAPEUTIC EXERCISES: CPT | Mod: GP | Performed by: PHYSICAL THERAPIST

## 2024-09-20 ENCOUNTER — HOSPITAL ENCOUNTER (EMERGENCY)
Facility: CLINIC | Age: 79
Discharge: HOME | End: 2024-09-20
Attending: STUDENT IN AN ORGANIZED HEALTH CARE EDUCATION/TRAINING PROGRAM | Admitting: STUDENT IN AN ORGANIZED HEALTH CARE EDUCATION/TRAINING PROGRAM
Payer: MEDICARE

## 2024-09-20 ENCOUNTER — NURSE TRIAGE (OUTPATIENT)
Dept: NURSING | Facility: CLINIC | Age: 79
End: 2024-09-20

## 2024-09-20 ENCOUNTER — APPOINTMENT (OUTPATIENT)
Dept: RADIOLOGY | Facility: CLINIC | Age: 79
End: 2024-09-20
Attending: STUDENT IN AN ORGANIZED HEALTH CARE EDUCATION/TRAINING PROGRAM
Payer: MEDICARE

## 2024-09-20 VITALS
RESPIRATION RATE: 18 BRPM | TEMPERATURE: 98.3 F | DIASTOLIC BLOOD PRESSURE: 92 MMHG | HEART RATE: 83 BPM | HEIGHT: 72 IN | BODY MASS INDEX: 31.83 KG/M2 | SYSTOLIC BLOOD PRESSURE: 182 MMHG | OXYGEN SATURATION: 98 % | WEIGHT: 235 LBS

## 2024-09-20 DIAGNOSIS — R53.1 WEAKNESS GENERALIZED: ICD-10-CM

## 2024-09-20 LAB
ALBUMIN UR-MCNC: 30 MG/DL
ANION GAP SERPL CALCULATED.3IONS-SCNC: 11 MMOL/L (ref 7–15)
APPEARANCE UR: CLEAR
BASOPHILS # BLD AUTO: 0 10E3/UL (ref 0–0.2)
BASOPHILS NFR BLD AUTO: 0 %
BILIRUB UR QL STRIP: NEGATIVE
BUN SERPL-MCNC: 16.6 MG/DL (ref 8–23)
CALCIUM SERPL-MCNC: 9.8 MG/DL (ref 8.8–10.4)
CHLORIDE SERPL-SCNC: 104 MMOL/L (ref 98–107)
COLOR UR AUTO: YELLOW
CREAT SERPL-MCNC: 1.18 MG/DL (ref 0.67–1.17)
EGFRCR SERPLBLD CKD-EPI 2021: 63 ML/MIN/1.73M2
EOSINOPHIL # BLD AUTO: 0 10E3/UL (ref 0–0.7)
EOSINOPHIL NFR BLD AUTO: 0 %
ERYTHROCYTE [DISTWIDTH] IN BLOOD BY AUTOMATED COUNT: 12.5 % (ref 10–15)
GLUCOSE SERPL-MCNC: 111 MG/DL (ref 70–99)
GLUCOSE UR STRIP-MCNC: NEGATIVE MG/DL
HCO3 SERPL-SCNC: 25 MMOL/L (ref 22–29)
HCT VFR BLD AUTO: 49.5 % (ref 40–53)
HGB BLD-MCNC: 16.8 G/DL (ref 13.3–17.7)
HGB UR QL STRIP: ABNORMAL
HYALINE CASTS: 1 /LPF
IMM GRANULOCYTES # BLD: 0 10E3/UL
IMM GRANULOCYTES NFR BLD: 1 %
INR PPP: 2.14 (ref 0.85–1.15)
KETONES UR STRIP-MCNC: NEGATIVE MG/DL
LEUKOCYTE ESTERASE UR QL STRIP: NEGATIVE
LYMPHOCYTES # BLD AUTO: 0.8 10E3/UL (ref 0.8–5.3)
LYMPHOCYTES NFR BLD AUTO: 10 %
MAGNESIUM SERPL-MCNC: 1.9 MG/DL (ref 1.7–2.3)
MCH RBC QN AUTO: 31.6 PG (ref 26.5–33)
MCHC RBC AUTO-ENTMCNC: 33.9 G/DL (ref 31.5–36.5)
MCV RBC AUTO: 93 FL (ref 78–100)
MONOCYTES # BLD AUTO: 1 10E3/UL (ref 0–1.3)
MONOCYTES NFR BLD AUTO: 12 %
MUCOUS THREADS #/AREA URNS LPF: PRESENT /LPF
NEUTROPHILS # BLD AUTO: 6.1 10E3/UL (ref 1.6–8.3)
NEUTROPHILS NFR BLD AUTO: 77 %
NITRATE UR QL: NEGATIVE
NRBC # BLD AUTO: 0 10E3/UL
NRBC BLD AUTO-RTO: 0 /100
PH UR STRIP: 6 [PH] (ref 5–7)
PLATELET # BLD AUTO: 104 10E3/UL (ref 150–450)
POTASSIUM SERPL-SCNC: 4.3 MMOL/L (ref 3.4–5.3)
PROCALCITONIN SERPL IA-MCNC: 0.04 NG/ML
RBC # BLD AUTO: 5.32 10E6/UL (ref 4.4–5.9)
RBC URINE: 1 /HPF
SODIUM SERPL-SCNC: 140 MMOL/L (ref 135–145)
SP GR UR STRIP: 1.02 (ref 1–1.03)
SQUAMOUS EPITHELIAL: <1 /HPF
TROPONIN T SERPL HS-MCNC: 16 NG/L
TROPONIN T SERPL HS-MCNC: 18 NG/L
UROBILINOGEN UR STRIP-MCNC: <2 MG/DL
WBC # BLD AUTO: 7.9 10E3/UL (ref 4–11)
WBC URINE: <1 /HPF

## 2024-09-20 PROCEDURE — 36415 COLL VENOUS BLD VENIPUNCTURE: CPT | Performed by: STUDENT IN AN ORGANIZED HEALTH CARE EDUCATION/TRAINING PROGRAM

## 2024-09-20 PROCEDURE — 258N000003 HC RX IP 258 OP 636: Performed by: STUDENT IN AN ORGANIZED HEALTH CARE EDUCATION/TRAINING PROGRAM

## 2024-09-20 PROCEDURE — 93005 ELECTROCARDIOGRAM TRACING: CPT | Performed by: STUDENT IN AN ORGANIZED HEALTH CARE EDUCATION/TRAINING PROGRAM

## 2024-09-20 PROCEDURE — 71046 X-RAY EXAM CHEST 2 VIEWS: CPT

## 2024-09-20 PROCEDURE — 81003 URINALYSIS AUTO W/O SCOPE: CPT | Performed by: STUDENT IN AN ORGANIZED HEALTH CARE EDUCATION/TRAINING PROGRAM

## 2024-09-20 PROCEDURE — 84484 ASSAY OF TROPONIN QUANT: CPT | Performed by: STUDENT IN AN ORGANIZED HEALTH CARE EDUCATION/TRAINING PROGRAM

## 2024-09-20 PROCEDURE — 85025 COMPLETE CBC W/AUTO DIFF WBC: CPT | Performed by: STUDENT IN AN ORGANIZED HEALTH CARE EDUCATION/TRAINING PROGRAM

## 2024-09-20 PROCEDURE — 80048 BASIC METABOLIC PNL TOTAL CA: CPT | Performed by: STUDENT IN AN ORGANIZED HEALTH CARE EDUCATION/TRAINING PROGRAM

## 2024-09-20 PROCEDURE — 99284 EMERGENCY DEPT VISIT MOD MDM: CPT | Mod: 25

## 2024-09-20 PROCEDURE — 83735 ASSAY OF MAGNESIUM: CPT | Performed by: STUDENT IN AN ORGANIZED HEALTH CARE EDUCATION/TRAINING PROGRAM

## 2024-09-20 PROCEDURE — 85610 PROTHROMBIN TIME: CPT | Performed by: STUDENT IN AN ORGANIZED HEALTH CARE EDUCATION/TRAINING PROGRAM

## 2024-09-20 PROCEDURE — 84145 PROCALCITONIN (PCT): CPT | Performed by: STUDENT IN AN ORGANIZED HEALTH CARE EDUCATION/TRAINING PROGRAM

## 2024-09-20 PROCEDURE — 96360 HYDRATION IV INFUSION INIT: CPT

## 2024-09-20 RX ADMIN — SODIUM CHLORIDE, POTASSIUM CHLORIDE, SODIUM LACTATE AND CALCIUM CHLORIDE 500 ML: 600; 310; 30; 20 INJECTION, SOLUTION INTRAVENOUS at 15:51

## 2024-09-20 ASSESSMENT — ACTIVITIES OF DAILY LIVING (ADL)
ADLS_ACUITY_SCORE: 38
ADLS_ACUITY_SCORE: 48

## 2024-09-20 ASSESSMENT — COLUMBIA-SUICIDE SEVERITY RATING SCALE - C-SSRS
2. HAVE YOU ACTUALLY HAD ANY THOUGHTS OF KILLING YOURSELF IN THE PAST MONTH?: NO
1. IN THE PAST MONTH, HAVE YOU WISHED YOU WERE DEAD OR WISHED YOU COULD GO TO SLEEP AND NOT WAKE UP?: NO
6. HAVE YOU EVER DONE ANYTHING, STARTED TO DO ANYTHING, OR PREPARED TO DO ANYTHING TO END YOUR LIFE?: NO

## 2024-09-20 NOTE — DISCHARGE INSTRUCTIONS
Fortunately your testing today does not show any signs of serious abnormalities.  Follow-up closely with your primary care doctor and return to the emergency department for any worsening symptoms or other concerns.

## 2024-09-20 NOTE — ED TRIAGE NOTES
Pt presents via EMS with generalized weakness and urinary incontinence. Had Covid 2 weeks ago. Denies any shortness of breath or cough. Denies any pain. Doesn't feel like he is going to the restroom more frequently     Triage Assessment (Adult)       Row Name 09/20/24 1342          Triage Assessment    Airway WDL WDL        Respiratory WDL    Respiratory WDL WDL        Skin Circulation/Temperature WDL    Skin Circulation/Temperature WDL WDL        Cardiac WDL    Cardiac WDL WDL        Peripheral/Neurovascular WDL    Peripheral Neurovascular WDL WDL        Cognitive/Neuro/Behavioral WDL    Cognitive/Neuro/Behavioral WDL WDL

## 2024-09-20 NOTE — ED PROVIDER NOTES
EMERGENCY DEPARTMENT ENCOUNTER      NAME: Carlos Ch  AGE: 79 year old male  YOB: 1945  MRN: [id number]  EVALUATION DATE & TIME: 9/20/2024  1:35 PM    PCP: Matt Ragland    ED PROVIDER: Richy Beckham MD      Chief Complaint   Patient presents with    Generalized Weakness         FINAL IMPRESSION:  No diagnosis found.      ED COURSE & MEDICAL DECISION MAKING:    Pertinent Labs & Imaging studies reviewed. (See chart for details)  79 year old male presents to the Emergency Department for evaluation of patient and wife    ED Course as of 09/20/24 1538   Fri Sep 20, 2024   1447 Patient is a 79-year-old male with history of atrial fibrillation on warfarin, tachybradycardia syndrome, hypertension, autoimmune hepatitis who presents to the emergency department for generalized weakness that began yesterday.  Patient's noted over the past day just a feeling of generalized profound weakness and has had difficulty walking related to that.  Wife reports that yesterday when he was trying to bed he stumbled forward but not fall the way to the ground or hit his head or neck or lose consciousness.  He denies any chest pain or abdominal pain.  No urinary symptoms.  Denies any diarrhea.  No nausea or vomiting.  No headache or neck pain.  Was diagnosed with COVID about 2 weeks ago and is had an ongoing cough since.    Exam patient appears fatigued but is in no acute distress.  Hypertensive but saturating well on room air.  Lungs are clear without any wheezes or rales.  Regular rate and slightly irregular heart rhythm.  Warm and well-perfused extremities, abdomen is soft and nontender nondistended, no midline C-spine tenderness.  No pain with palpation of the chest or abdominal wall no bruising.    Initial differentials broad but includes not limited to underlying occult infection, metabolic derangement, ACS.  Obtain blood work including troponin as well as an EKG and chest x-ray.  He has no abdominal  tenderness at this point  in time that would make me suspicious for acute underlying surgical process requiring CT imaging   1537 EKG shows atrial fibrillation rate of 83 beats a minute, normal axis, normal QRS and QTc, no ST elevations or acute ischemic T wave changes.   1537 Labs reviewed interpret myself.  CBC is reassuring with normal white count and no significant anemia.  Mild thrombocytopenia noted 104.  BMP shows normal electrolytes.  Slightly elevated creatinine up to 1.18 from baseline around 1.  First troponin reassuring at 18 but will repeat to evaluate for any significant increase.  Procalcitonin is negative.  Urinalysis not suggestive of infection.   1538 At the end of my shift final physician plans pending results of chest x-ray and repeat troponin.  If these are reassuring and patient is able to safely ambulate under his own power should be safe for discharge home.  Patient signed out to a colleague of cleo at the end of my shift to update documentation and treatment plan as needed     2:33 PM I met and evaluated the patient.       Medical Decision Making  Obtained supplemental history:Supplemental history obtained?: Family Member/Significant Other  Reviewed external records: External records reviewed?: Outpatient Record: 9/9/24, [facility]  Care impacted by chronic illness:Heart Disease, Hyperlipidemia, and Hypertension  Care significantly affected by social determinants of health:N/A  Did you consider but not order tests?: Work up considered but not performed and documented in chart, if applicable  Did you interpret images independently?: Independent interpretation of ECG and images noted in documentation, when applicable.  Consultation discussion with other provider:Did you involve another provider (consultant, , pharmacy, etc.)?: No  Admission considered. Patient was signed out to the oncoming physician, disposition pending.  Not Applicable          At the conclusion of the  encounter I discussed the results of all of the tests and the disposition. The questions were answered. The patient or family acknowledged understanding and was agreeable with the care plan.     0 minutes of critical care time     MEDICATIONS GIVEN IN THE EMERGENCY:  Medications - No data to display    NEW PRESCRIPTIONS STARTED AT TODAY'S ER VISIT  New Prescriptions    No medications on file          =================================================================    HPI    Patient information was obtained from: patient and wife    Use of : N/A         Carlos Ch is a 79 year old male with a pertinent history of a-fib. Hypertension. Hyperlipidemia, pacemaker who presents to this ED by EMS for evaluation of generalized weakness.     Patient presents to the ED for concerns of generalized weakness that started yesterday (9/19/24). He states that he felt normal prior to yesterday. His wife notes that he had covid x2 weeks ago. She states that ever since then he endorses a low grade fever on and off of about 99. He also endorses cough. He is anticoagulated on Warfarin due to a-fib. His last INR check was last week and no problems were found. His wife also mentions that he has a form of parkinson's that causes his muscles to give out when he gets sick.     She notes that he collapsed yesterday night when trying to get into bed. He fell to the floor gently. He was leaning over the bed and his knees gave out. He did not hit his head and he did not lose consciousness. He does not endorse any pain due to this fall.     Patient denies any nausea, headache, chest pain, abdominal pain, urinary symptoms, diarrhea, and rash. Patient states no other complaints or concerns at this time.     Per Chart Review:   9/9/24, [facility]: Interacting medications: Warfarin and Paxlovid. via telephone Spoke with patient Reviewed signs and symptoms to watch for including abnormal bruising, bleeding, and/or  clotting issues as determined by the interaction noted in Micromedex. Can also cause INR to go down putting him at risk for clotting. When to continue to monitor and report to Anticoagulation nurse or PCP vs when to call 911/be seen in Emergency Department for safety. Dosing and importance of next recommended INR check date. Plan: Patient advised to Return to anticoagulation clinic in 3-5 day(s) with no warfarin dose adjustment.       REVIEW OF SYSTEMS   Refer to the HPI    PAST MEDICAL HISTORY:  Past Medical History:   Diagnosis Date    Tachy-wesly syndrome (H)        PAST SURGICAL HISTORY:  Past Surgical History:   Procedure Laterality Date    CV TEMPORARY PACEMAKER INSERTION             CURRENT MEDICATIONS:    acetaminophen (TYLENOL) 325 MG tablet  atorvastatin (LIPITOR) 40 MG tablet  azaTHIOprine (IMURAN) 50 MG tablet  calcium polycarbophil (FIBERCON) 625 MG tablet  carbidopa-levodopa (SINEMET)  MG tablet  finasteride (PROSCAR) 5 MG tablet  lisinopril (ZESTRIL) 10 MG tablet  metoprolol succinate ER (TOPROL-XL) 25 MG 24 hr tablet  multivitamin w/minerals (THERA-VIT-M) tablet  ondansetron (ZOFRAN) 4 MG tablet  oxyCODONE (ROXICODONE) 5 MG tablet  senna-docusate (SENOKOT-S/PERICOLACE) 8.6-50 MG tablet  terazosin (HYTRIN) 1 MG capsule  WARFARIN SODIUM PO        ALLERGIES:  Allergies   Allergen Reactions    Clopidogrel Rash    Celecoxib Rash    Sulfa Antibiotics Rash       FAMILY HISTORY:  Family History   Problem Relation Age of Onset    Diabetes Brother     Coronary Artery Disease Brother        SOCIAL HISTORY:   Social History     Socioeconomic History    Marital status:    Tobacco Use    Smoking status: Former     Types: Cigarettes    Smokeless tobacco: Never     Social Determinants of Health     Financial Resource Strain: Low Risk  (9/26/2023)    [facility]    Overall Financial Resource Strain (CARDIA)     Difficulty of Paying Living Expenses: Not hard at all   Food  Insecurity: No Food Insecurity (9/26/2023)    Received from [facility]    Hunger Vital Sign     Worried About Running Out of Food in the Last Year: Never true     Ran Out of Food in the Last Year: Never true   Transportation Needs: No Transportation Needs (9/26/2023)    Received from [facility]    PRAPARE - Transportation     Lack of Transportation (Medical): No     Lack of Transportation (Non-Medical): No   Physical Activity: Inactive (9/26/2023)    Received from [facility]    Exercise Vital Sign     Days of Exercise per Week: 0 days     Minutes of Exercise per Session: 0 min    [facility] from [facility]    Social Connections   Housing Stability: Low Risk  (9/26/2023)    Received from [facility]    Housing Stability     What is your living situation today?: I have a steady place to live       VITALS:  BP (!) 168/97   Pulse 80   Temp 98.3  F (36.8  C) (Oral)   Resp 18   Ht 1.829 m (6')   Wt 106.6 kg (235 lb)   SpO2 94%   BMI 31.87 kg/m      PHYSICAL EXAM    Constitutional: Well developed, Well nourished, NAD,   HENT: Normocephalic, Atraumatic, slightly dry mucous membranes.  Neck- trachea midline, No stridor.  No C-spine tenderness  Eyes:EOMI, Conjunctiva normal, No discharge.   Respiratory: Normal breath sounds, No respiratory distress, No wheezing.    Cardiovascular: Normal heart rate, irregular rhythm,  No murmurs,   Abdominal: Soft, No tenderness, No rebound or guarding.     Musculoskeletal: no deformity or malalignment   Integument: Warm, Dry, No erythema,    Neurologic: Alert & oriented x 3, soft voice, no facial droop  Psychiatric: Affect normal, Cooperative.      LAB:  All pertinent labs reviewed and interpreted.       RADIOLOGY:  Reviewed all pertinent imaging. Please see official radiology report.  No orders to display       EKG:    Performed at:     Impression: EKG shows atrial fibrillation rate of 83 beats a  minute, normal axis, normal QRS and QTc, no ST elevations or acute ischemic T wave changes.        I have independently reviewed and interpreted the EKG(s) documented above.    PROCEDURES:         [employer] Documentation:   CMS Diagnoses:              I, Rodri Elias, am serving as a scribe to document services personally performed by Richy Beckham MD based on my observation and the provider's statements to me. I, Richy Beckham MD, attest that Rodri Griffin is acting in a scribe capacity, has observed my performance of the services and has documented them in accordance with my direction.    Richy Beckham MD  [facility] EMERGENCY ROOM  [address] [address] [phone]      Richy Beckham MD  09/20/24 4277

## 2024-09-21 LAB
ATRIAL RATE - MUSE: 80 BPM
DIASTOLIC BLOOD PRESSURE - MUSE: 96 MMHG
INTERPRETATION ECG - MUSE: NORMAL
P AXIS - MUSE: NORMAL DEGREES
PR INTERVAL - MUSE: NORMAL MS
QRS DURATION - MUSE: 96 MS
QT - MUSE: 378 MS
QTC - MUSE: 444 MS
R AXIS - MUSE: -51 DEGREES
SYSTOLIC BLOOD PRESSURE - MUSE: 169 MMHG
T AXIS - MUSE: 24 DEGREES
VENTRICULAR RATE- MUSE: 83 BPM

## 2024-09-21 NOTE — TELEPHONE ENCOUNTER
Nurse Triage SBAR    Is this a 2nd Level Triage? NO    Situation: Wife calling. Consent to communicate in chart.     Background: Weakness.     Assessment: Pt is unable to stand.  He was seen in ED today and sent home. Pt's legs will not support him.  Recovered from covid one week ago.  Low grade temp.  BP was elevated today. Has a pacemaker and history of Parkinsons disease. Upon leaving the ED today, pt was able to ambulate. Wife and adult children are unable to get pt up to the bathroom.     Protocol Recommended Disposition:   Call  Now    Recommendation: Call 911 now.  Wife will call 911 now.            Does the patient meet one of the following criteria for ADS visit consideration? No    Reason for Disposition   [1] SEVERE weakness (i.e., unable to walk or barely able to walk, requires support) AND [2] new-onset or worsening    Additional Information   Negative: SEVERE difficulty breathing (e.g., struggling for each breath, speaks in single words)    Protocols used: Weakness (Generalized) and Fatigue-A-AH

## 2024-09-30 ENCOUNTER — THERAPY VISIT (OUTPATIENT)
Dept: PHYSICAL THERAPY | Facility: REHABILITATION | Age: 79
End: 2024-09-30
Payer: MEDICARE

## 2024-09-30 DIAGNOSIS — G20.C ATYPICAL PARKINSONISM (H): Primary | ICD-10-CM

## 2024-09-30 PROCEDURE — 97112 NEUROMUSCULAR REEDUCATION: CPT | Mod: GP | Performed by: PHYSICAL THERAPIST

## 2024-09-30 PROCEDURE — 97110 THERAPEUTIC EXERCISES: CPT | Mod: GP | Performed by: PHYSICAL THERAPIST

## 2024-10-03 ENCOUNTER — THERAPY VISIT (OUTPATIENT)
Dept: PHYSICAL THERAPY | Facility: REHABILITATION | Age: 79
End: 2024-10-03
Payer: MEDICARE

## 2024-10-03 DIAGNOSIS — G20.C ATYPICAL PARKINSONISM (H): Primary | ICD-10-CM

## 2024-10-03 PROCEDURE — 97110 THERAPEUTIC EXERCISES: CPT | Mod: GP | Performed by: PHYSICAL THERAPIST

## 2024-10-03 PROCEDURE — 97112 NEUROMUSCULAR REEDUCATION: CPT | Mod: GP | Performed by: PHYSICAL THERAPIST

## 2024-10-07 ENCOUNTER — THERAPY VISIT (OUTPATIENT)
Dept: PHYSICAL THERAPY | Facility: REHABILITATION | Age: 79
End: 2024-10-07
Payer: MEDICARE

## 2024-10-07 DIAGNOSIS — G20.C ATYPICAL PARKINSONISM (H): Primary | ICD-10-CM

## 2024-10-07 PROCEDURE — 97110 THERAPEUTIC EXERCISES: CPT | Mod: GP | Performed by: PHYSICAL THERAPIST

## 2024-10-07 PROCEDURE — 97112 NEUROMUSCULAR REEDUCATION: CPT | Mod: GP | Performed by: PHYSICAL THERAPIST

## 2024-10-07 PROCEDURE — 97530 THERAPEUTIC ACTIVITIES: CPT | Mod: GP | Performed by: PHYSICAL THERAPIST

## 2024-10-10 ENCOUNTER — THERAPY VISIT (OUTPATIENT)
Dept: PHYSICAL THERAPY | Facility: REHABILITATION | Age: 79
End: 2024-10-10
Payer: MEDICARE

## 2024-10-10 DIAGNOSIS — G20.C ATYPICAL PARKINSONISM (H): Primary | ICD-10-CM

## 2024-10-10 PROCEDURE — 97110 THERAPEUTIC EXERCISES: CPT | Mod: GP | Performed by: PHYSICAL THERAPIST

## 2024-10-10 PROCEDURE — 97112 NEUROMUSCULAR REEDUCATION: CPT | Mod: GP | Performed by: PHYSICAL THERAPIST

## 2024-10-14 ENCOUNTER — THERAPY VISIT (OUTPATIENT)
Dept: PHYSICAL THERAPY | Facility: REHABILITATION | Age: 79
End: 2024-10-14
Payer: MEDICARE

## 2024-10-14 DIAGNOSIS — G20.C ATYPICAL PARKINSONISM (H): Primary | ICD-10-CM

## 2024-10-14 PROCEDURE — 97112 NEUROMUSCULAR REEDUCATION: CPT | Mod: GP | Performed by: PHYSICAL THERAPIST

## 2024-10-14 PROCEDURE — 97530 THERAPEUTIC ACTIVITIES: CPT | Mod: GP | Performed by: PHYSICAL THERAPIST

## 2024-10-14 PROCEDURE — 97110 THERAPEUTIC EXERCISES: CPT | Mod: GP | Performed by: PHYSICAL THERAPIST

## 2024-10-17 ENCOUNTER — THERAPY VISIT (OUTPATIENT)
Dept: PHYSICAL THERAPY | Facility: REHABILITATION | Age: 79
End: 2024-10-17
Payer: MEDICARE

## 2024-10-17 DIAGNOSIS — G20.C ATYPICAL PARKINSONISM (H): Primary | ICD-10-CM

## 2024-10-17 PROCEDURE — 97110 THERAPEUTIC EXERCISES: CPT | Mod: GP | Performed by: PHYSICAL THERAPIST

## 2024-10-17 PROCEDURE — 97112 NEUROMUSCULAR REEDUCATION: CPT | Mod: GP | Performed by: PHYSICAL THERAPIST

## 2024-10-21 ENCOUNTER — THERAPY VISIT (OUTPATIENT)
Dept: PHYSICAL THERAPY | Facility: REHABILITATION | Age: 79
End: 2024-10-21
Payer: MEDICARE

## 2024-10-21 DIAGNOSIS — G20.C ATYPICAL PARKINSONISM (H): Primary | ICD-10-CM

## 2024-10-21 PROCEDURE — 97112 NEUROMUSCULAR REEDUCATION: CPT | Mod: GP | Performed by: PHYSICAL THERAPIST

## 2024-10-21 PROCEDURE — 97530 THERAPEUTIC ACTIVITIES: CPT | Mod: GP | Performed by: PHYSICAL THERAPIST

## 2024-10-21 PROCEDURE — 97110 THERAPEUTIC EXERCISES: CPT | Mod: GP | Performed by: PHYSICAL THERAPIST

## 2024-10-24 ENCOUNTER — THERAPY VISIT (OUTPATIENT)
Dept: PHYSICAL THERAPY | Facility: REHABILITATION | Age: 79
End: 2024-10-24
Payer: MEDICARE

## 2024-10-24 DIAGNOSIS — G20.C ATYPICAL PARKINSONISM (H): Primary | ICD-10-CM

## 2024-10-24 PROCEDURE — 97112 NEUROMUSCULAR REEDUCATION: CPT | Mod: GP | Performed by: PHYSICAL THERAPIST

## 2024-10-24 PROCEDURE — 97110 THERAPEUTIC EXERCISES: CPT | Mod: GP | Performed by: PHYSICAL THERAPIST

## 2024-10-24 NOTE — PROGRESS NOTES
"PROGRESS NOTE and RE-CERT     [facility]                                                                                   OUTPATIENT PHYSICAL THERAPY    PLAN OF TREATMENT FOR OUTPATIENT REHABILITATION   Patient's Last Name, First Name, Carlos Pritchett YOB: 1945   Provider's Name   [facility]   Medical Record No.  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 Plan of Treatment  Frequency/Duration: 2x/week/ 10 weeks    Certification date from (P) 10/24/24 to (P) 01/21/25         See note for plan of treatment details and functional goals     Martha Medina, PT                         I CERTIFY THE NEED FOR THESE SERVICES FURNISHED UNDER        THIS PLAN OF TREATMENT AND WHILE UNDER MY CARE     (Physician attestation of this document indicates review and certification of the therapy plan).              Referring Provider:  Sara Walton    Initial Assessment  See Epic Evaluation- Start of Care Date: 08/19/24            PLAN  Continue therapy per current plan of care.    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   Other pertinent information From eval:  Patient is a 79 year old male with a referral diagnosis of atypical parkinsonism. Per chart review \"suspect a neurodegenerative disorder characterized by parkinsonism, autonomic dysfunction, and cognitive decline.\" Patient unsure of when " his last fall was, however he does note that he did get hurt. He denies any episodes of freezing during ambulation but endorses festination type symptoms. He feels his strength and balance have declined and he would like to improve these with physical therapy.   Progress Note/Certification   Start of Care Date 08/19/24   Onset of illness/injury or Date of Surgery 06/19/24  (referral date)   Therapy Frequency 2x/week   Predicted Duration 10 weeks   Certification date from 10/24/24   Certification date to 01/21/25   Progress Note Completed Date 10/24/24   PT Goal 1   Goal Identifier HEP   Goal Description Pt will be able to demonstrate HEP activities without need for cues from provider to demonstrate understanding and independence with HEP.   Rationale to maximize safety and independence with performance of ADLs and functional tasks;to maximize safety and independence within the home;to maximize safety and independence within the community   Goal Progress Progressing - continue goal   Target Date 01/21/25   PT Goal 2   Goal Identifier MiniBest   Goal Description Pt will demonstrate a 6 point improvement on miniBEST test to demonstrate clinically significant difference in score to demonstrate improved safety with balance and decrease risk of falls.   Rationale to maximize safety and independence within the home;to maximize safety and independence with performance of ADLs and functional tasks;to maximize safety and independence within the community   Target Date 01/21/25   Goal Progress Not re-tested today - continue goal   PT Goal 3   Goal Identifier Gait speed   Goal Description Pt will demonstrate a 0.12 second improvement in gait speed to demonstrate minimum clinically significant difference in score to demonstrate improved gait velocity.   Rationale to maximize safety and independence with performance of ADLs and functional tasks;to maximize safety and independence within the home;to maximize safety and  independence within the community   Target Date 01/21/25   Goal Progress Not re-tested today - continue goal   PT Goal 4   Goal Identifier 5xSTS   Goal Description Pt will demonstrate a 2.3 seconds improvement on 5xSTS to demonstrate minimum clinically significant difference in score to demonstrate improved LE strength.   Rationale to maximize safety and independence with performance of ADLs and functional tasks;to maximize safety and independence within the home;to maximize safety and independence within the community   Target Date 11/16/24   Goal Progress Met   Date Met 10/24/24   Subjective Report   Subjective Report Carlos reports that thing are going well and he does feel like PT has been helpful. He does feel stronger and also notes that he feels like he is walking some better today.   Objective Measure 1   Objective Measure 6 min walk test, NO AD.  stopped at 5 min 23s, 295 steps, 10/14/24   Objective Measure 2   Objective Measure 10MWT   Details 0.31 m/s comfortable, 0.35 m/s fast   Objective Measure 3   Objective Measure 5xSTS   Details 22.49 sec (eval); 10/24/24=13.87 seconds   Objective Measure 4   Objective Measure mCTSIB   Details Cond 1: 30  Cond 2: 30  Cond 4: 30  Cond 5 : 2.65   Objective Measure 5   Objective Measure Mini-BESTest: 8/28 --8/22/24   Therapeutic Procedure/Exercise   Therapeutic Procedures: strength, endurance, ROM, flexibility minutes (65680) 15   Ther Proc 1 Nustep seat and arms 15, 7 min, workload 5   Ther Proc 1 - Details sitting #1-floor to ceiling: hold 10 seconds x5   Ther Proc 2 no specific cues today   Ther Proc 2 - Details sittingBIG #2- side to side hold 10 seconds X 5, B   Ther Proc 3 cues for edge of chair, hip turn, and palm up   PTRx Ther Proc 3 Sitting Theraband T's   Skilled Intervention warm up, BIG ex #1-2   Patient Response/Progress good understanding, no specific cues today   Therapeutic Activity   Ther Act 1 walking in hallways with no AD.  Used canes in each hand  "with therapist behing pt helping with mechancics of swinging his arms appropriately with appropriate leg using the canes   Ther Act 1 - Details amb in hallways using canes-pt struggled with the mechanics of the arm swing and legs.  He had carryover with his arm swing for about 10' then there was no arm swing.   Ther Act 2 discussed where he uses his walker most of the time when he is walking, will focus with that in the clinic as well and spend more time on balance which his walking will benefit from as well.   Neuromuscular Re-education   Neuromuscular re-ed of mvmt, balance, coord, kinesthetic sense, posture, proprioception minutes (06030) 25   Neuromuscular Re-education Neuro Re-ed 6;Neuro Re-ed 7;Neuro Re-ed 8   Neuro Re-ed 1 BIG #3 stepping forward X 10, B-ADAPTED   Neuro Re-ed 1 - Details NORMAL today - pt did well   Neuro Re-ed 2 BIG ex#4-stepping sideways X 10, B-ADAPTED   Neuro Re-ed 2 - Details NORMAL today - pt did well   Neuro Re-ed 3 BIG ex #5-stepping backward X 10, B, ADAPTED   Neuro Re-ed 5 BIG ex #6 rock and reach: Bx10 Adapted   Neuro Re-ed 6 BIG x #7 standing twisting, X 10 B-ADAPTED   Neuro Re-ed 6 - Details NORMAL today - pt did well   Neuro Re-ed 7 Line: fwd tandem, backwards, side step, march, head turns 2x20 feet each   Skilled Intervention BIG PD movment disorder ex #3-7; balance tasks   Patient Response/Progress Pt was challenged with balance   Neuro Re-ed 7 - Details Airex: NBOS EC; partial tandem R, L x30\" each   Education   Learner/Method Patient;Demonstration;Listening   Education Comments Pt verbalizes/demos understanding of education.   Plan   Home program PTRx   Updates to plan of care RE-CERT/PN completed today 10/24/24   Plan for next session BIG ex, balance and strength in clinc   Comments   Comments Patient is a 79 year old male with a referral diagnosis of atypical parkinsonism. He presents with decreased gait speed and other deviations including shuffling, increasing his risk " for falls. He got Covid shorly after his eval in August and it has taken him quite some time to recover and therefore we got a slow start to his PT work. He notes improvements with his walking as well as overall strength and balance.  He does show a great increase in his STS speed today compared to eval.  He did better well with the PD Big movement disorder exercises today with only a few cues needed.  He was even able to do exercises 3 and 4 with not hanging on today.  He will continued to benefit from PT as his current impairments interfere with his ability to perform self care tasks, recreational activities, household chores, household mobility, and community mobility as compared to previous level of function. His PT sessions will continued to benefit from skilled PT intervention to address deficits and progress goals.     Martha Medina, PT

## 2024-10-28 ENCOUNTER — THERAPY VISIT (OUTPATIENT)
Dept: PHYSICAL THERAPY | Facility: REHABILITATION | Age: 79
End: 2024-10-28
Payer: MEDICARE

## 2024-10-28 DIAGNOSIS — G20.C ATYPICAL PARKINSONISM (H): Primary | ICD-10-CM

## 2024-10-28 PROCEDURE — 97112 NEUROMUSCULAR REEDUCATION: CPT | Mod: GP | Performed by: PHYSICAL THERAPIST

## 2024-10-28 PROCEDURE — 97110 THERAPEUTIC EXERCISES: CPT | Mod: GP | Performed by: PHYSICAL THERAPIST

## 2024-11-04 ENCOUNTER — THERAPY VISIT (OUTPATIENT)
Dept: PHYSICAL THERAPY | Facility: REHABILITATION | Age: 79
End: 2024-11-04
Payer: MEDICARE

## 2024-11-04 DIAGNOSIS — G20.C ATYPICAL PARKINSONISM (H): Primary | ICD-10-CM

## 2024-11-04 PROCEDURE — 97112 NEUROMUSCULAR REEDUCATION: CPT | Mod: KX | Performed by: PHYSICAL THERAPIST

## 2024-11-04 PROCEDURE — 97530 THERAPEUTIC ACTIVITIES: CPT | Mod: KX | Performed by: PHYSICAL THERAPIST

## 2024-11-04 PROCEDURE — 97110 THERAPEUTIC EXERCISES: CPT | Mod: KX | Performed by: PHYSICAL THERAPIST

## 2024-11-06 ENCOUNTER — THERAPY VISIT (OUTPATIENT)
Dept: PHYSICAL THERAPY | Facility: REHABILITATION | Age: 79
End: 2024-11-06
Payer: MEDICARE

## 2024-11-06 DIAGNOSIS — G20.C ATYPICAL PARKINSONISM (H): Primary | ICD-10-CM

## 2024-11-06 PROCEDURE — 97112 NEUROMUSCULAR REEDUCATION: CPT | Mod: KX | Performed by: PHYSICAL THERAPIST

## 2024-11-06 PROCEDURE — 97110 THERAPEUTIC EXERCISES: CPT | Mod: KX | Performed by: PHYSICAL THERAPIST

## 2024-11-07 ENCOUNTER — THERAPY VISIT (OUTPATIENT)
Dept: OCCUPATIONAL THERAPY | Facility: REHABILITATION | Age: 79
End: 2024-11-07
Payer: MEDICARE

## 2024-11-07 DIAGNOSIS — R29.898 HAND WEAKNESS: ICD-10-CM

## 2024-11-07 DIAGNOSIS — R27.9 INCOORDINATION: Primary | ICD-10-CM

## 2024-11-07 DIAGNOSIS — R53.83 OTHER FATIGUE: ICD-10-CM

## 2024-11-07 DIAGNOSIS — R41.89 COGNITIVE CHANGES: ICD-10-CM

## 2024-11-07 PROCEDURE — 97165 OT EVAL LOW COMPLEX 30 MIN: CPT | Mod: GO | Performed by: OCCUPATIONAL THERAPIST

## 2024-11-07 PROCEDURE — 97110 THERAPEUTIC EXERCISES: CPT | Mod: GO | Performed by: OCCUPATIONAL THERAPIST

## 2024-11-07 NOTE — PROGRESS NOTES
"OCCUPATIONAL THERAPY EVALUATION  Type of Visit: Evaluation       Fall Risk Screen:  Fall screen completed by: OT  Have you fallen 2 or more times in the past year?: Yes  Have you fallen and had an injury in the past year?: Yes  Is patient a fall risk?: Yes  Fall screen comments: already working with PT    Subjective        Presenting condition or subjective complaint:   Per Neurology note in EMR on 6-19-24, \"Carlos Ch is a 79 year old male with a neurodegenerative disorder characterized by parkinsonism, autonomic dysfunction, and cognitive decline. The patient's main concern today is frequent urination, fatigue, shuffling gait. We discussed the difference between Parkinson Disease and Atypical Parkinsonism and discussed management options for his symptoms.\"    Patient is currently working with PT on or Spoqa. He also reports difficulty with fine motor skills, sensation. Provider notes change in cognition. Symptoms are impacting his ability to function.    Date of onset: 08/21/24 (date of referral to OT)    Relevant medical history:     Past Medical History:   Diagnosis Date    Tachy-wesly syndrome (H)      Dates & types of surgery:   See EMR    Prior diagnostic imaging/testing results:      See EMR  Prior therapy history for the same diagnosis, illness or injury: (Patient-Rptd) No      Prior Level of Function  Transfers: Independent  Ambulation: Independent  ADL: Independent  IADL: Independent    Living Environment  Social support: (Patient-Rptd) With family members (wife)  Type of home: (Patient-Rptd) House (one level)  Stairs to enter the home: (Patient-Rptd) No       Ramp: (Patient-Rptd) No   Stairs inside the home: (Patient-Rptd) No       Help at home: (Patient-Rptd) Assist for driving and community activities  Equipment owned: (Patient-Rptd) Walker; Walker with wheels; Standard wheelchair     Employment: (Patient-Rptd) No    Hobbies/Interests:      Patient goals for therapy:      Pain " assessment: Pain denied     Objective     Cognitive Status Examination  Orientation: Oriented to person, place and time   Level of Consciousness: Alert  Follows Commands and Answers Questions: 100% of the time  Personal Safety and Judgement: Intact  Memory: Impaired  Attention: No deficits identified  Organization/Problem Solving: No deficits identified  Executive Function: Working memory impaired, decreased storage of information for performing tasks    VISUAL SKILLS  Visual Acuity: Wears glasses, bifocals  Visual Field: Appears normal  Visual Attention: Appears normal  Oculomotor: NT    SENSATION: Numbness in B hands and feet    RANGE OF MOTION: UE AROM WNL  STRENGTH:   Pain: - none + mild ++ moderate +++ severe  Strength Scale: 0-5/5 Left Right    Strength (lbs) 32# 50#   Lateral Pinch (lbs) 12# 13#   3 Point Pinch (lbs) 9# 10#        Hand Dominance: Right  MUSCLE TONE: WNL  COORDINATION: Left Hand, Nine Hole Peg Test (sec): 37.11 seconds  Right Hand, Nine Hole Peg Test (sec): 32.06 seconds  BALANCE: Fair    FUNCTIONAL MOBILITY  Assistive Device(s): Walker (front wheeled)  Ambulation: Independent    BED MOBILITY: Independent    TRANSFERS: Independent    BATHING: Independent  Equipment: Shower chair/Tub bench    UPPER BODY DRESSING: Independent however it is difficult  Equipment: none    LOWER BODY DRESSING: Independent however it is difficult  Equipment: none    TOILETING: Independent, high toilet  Equipment: none    GROOMING: Independent  Equipment: none    EATING/SELF FEEDING: Independent   Equipment: none    ACTIVITY TOLERANCE: Fair    INSTRUMENTAL ACTIVITIES OF DAILY LIVING (IADL):   Meal Planning/Prep: Patient is not able to perform cooking or shopping as much as he would like or has done in the past  Home Management: Association for outdoor chores. Patient's wife does the cleaning and laundry  Financial Management: Patient's wife manages finances  Communication/Computer Use: Uses I-pad  independently  Community Mobility: Patient is no longer driving  Care of Others: N/A    Functional Assessment of Chronic Illness Therapy - Fatigue (FACIT-F):  20/52         Assessment & Plan   CLINICAL IMPRESSIONS  Medical Diagnosis: Fine motor impairment  Cognitive decline    Treatment Diagnosis: fatigue, cognitive changes, incoordination, hand weakness    Impression/Assessment: Pt is a 79 year old male presenting to Occupational Therapy due to Atypical parkinsonism .  The following significant findings have been identified: Impaired activity tolerance, Impaired cognition, Impaired coordination, and Impaired strength.  These identified deficits interfere with their ability to perform self care tasks, recreational activities, household chores, and meal planning and preparation as compared to previous level of function.     Clinical Decision Making (Complexity):  Assessment of Occupational Performance: 5 or more Performance Deficits  Occupational Performance Limitations: toileting, dressing, functional mobility, home establishment and management, meal preparation and cleanup, shopping, and leisure activities  Clinical Decision Making (Complexity): Low complexity    PLAN OF CARE  Treatment Interventions:  Interventions: Self-Care/Home Management, Therapeutic Activity, Therapeutic Exercise    Long Term Goals   OT Goal 1  Goal Identifier: Fatigue  Goal Description: Patient to state 3 strategies for energy conservation/work simplification and fatigue management for improved independence with ADL/IADLs at home as evidenced by improved FACIT score  Goal Progress: w+12  OT Goal 2  Goal Identifier: B  strength  Goal Description: Patient to demonstrate independence with HEP for B UE strength and measure improved B  strength by at least 5#s for increased ADL/IADL independence (hanging onto objects during hygiene and grooming, cutting food, completing B UE tasks, etc.).  Target Date: 01/30/25  OT Goal 3  Goal  Identifier: B pinch strength  Goal Description: Patient will demonstrate increased B hand pinch strength (both lateral and palmar) by 2# each for increased independence with ADL/IADLs such as opening containers, pull up pants, maintaining pinch to hold items, etc.  Target Date: 01/30/25  OT Goal 4  Goal Identifier: AE  Goal Description: Patient to verbalize and utilize 3 strategies and/or AE to compensate for decreased UE function and promote and demonstrate increased independence with ADL/IADLs  Target Date: 01/30/25  OT Goal 5  Goal Identifier: B fm coordination  Goal Description: Patient to demonstrate improved B  coordination skills by demonstrating  improved 9-Hole Peg Test score for increased performance with FM ADLs (manipulating buttons, zippers manipulating buttons, opening containers and writing etc.)  Target Date: 01/30/25      Frequency of Treatment: once a week  Duration of Treatment: 12 weeks     Recommended Referrals to Other Professionals: speech therapy   Education Assessment: Learner/Method: (P) Patient     Risks and benefits of evaluation/treatment have been explained.   Patient agrees with Plan of Care.     Evaluation Time:    OT Eval, Low Complexity Minutes (19908): (P) 30       Signing Clinician: Loni Kitchen OT        [facility]                                                                                   OUTPATIENT OCCUPATIONAL THERAPY      PLAN OF TREATMENT FOR OUTPATIENT REHABILITATION   Patient's Last Name, First Name, Carlos Pritchett YOB: 1945   Provider's Name   [facility]   Medical Record No.  [id number]     Onset Date: 08/21/24 (date of referral to OT) Start of Care Date: 11/07/24     Medical Diagnosis:  Fine motor impairment  Cognitive decline      OT Treatment Diagnosis:  fatigue, cognitive changes, incoordination, hand weakness Plan of Treatment  Frequency/Duration:once a week/12  weeks    Certification date from 11/07/24   To 01/30/25        See note for plan of treatment details and functional goals     Loni Kitchen OT                         I CERTIFY THE NEED FOR THESE SERVICES FURNISHED UNDER        THIS PLAN OF TREATMENT AND WHILE UNDER MY CARE     (Physician attestation of this document indicates review and certification of the therapy plan).              Referring Provider:  Sara Walton DO    Initial Assessment  See Epic Evaluation- 11/07/24

## 2024-11-13 ENCOUNTER — THERAPY VISIT (OUTPATIENT)
Dept: PHYSICAL THERAPY | Facility: REHABILITATION | Age: 79
End: 2024-11-13
Payer: MEDICARE

## 2024-11-13 DIAGNOSIS — G20.C ATYPICAL PARKINSONISM (H): Primary | ICD-10-CM

## 2024-11-13 PROCEDURE — 97112 NEUROMUSCULAR REEDUCATION: CPT | Mod: GP | Performed by: PHYSICAL THERAPIST

## 2024-11-13 PROCEDURE — 97110 THERAPEUTIC EXERCISES: CPT | Mod: GP | Performed by: PHYSICAL THERAPIST

## 2024-11-13 PROCEDURE — 97530 THERAPEUTIC ACTIVITIES: CPT | Mod: GP | Performed by: PHYSICAL THERAPIST

## 2024-11-14 ENCOUNTER — THERAPY VISIT (OUTPATIENT)
Dept: PHYSICAL THERAPY | Facility: REHABILITATION | Age: 79
End: 2024-11-14
Payer: MEDICARE

## 2024-11-14 ENCOUNTER — THERAPY VISIT (OUTPATIENT)
Dept: OCCUPATIONAL THERAPY | Facility: REHABILITATION | Age: 79
End: 2024-11-14
Payer: MEDICARE

## 2024-11-14 DIAGNOSIS — R29.898 HAND WEAKNESS: ICD-10-CM

## 2024-11-14 DIAGNOSIS — R41.89 COGNITIVE CHANGES: ICD-10-CM

## 2024-11-14 DIAGNOSIS — R27.9 INCOORDINATION: Primary | ICD-10-CM

## 2024-11-14 DIAGNOSIS — G20.C ATYPICAL PARKINSONISM (H): Primary | ICD-10-CM

## 2024-11-14 DIAGNOSIS — R53.83 OTHER FATIGUE: ICD-10-CM

## 2024-11-14 PROCEDURE — 97535 SELF CARE MNGMENT TRAINING: CPT | Mod: GO | Performed by: OCCUPATIONAL THERAPIST

## 2024-11-14 PROCEDURE — 97112 NEUROMUSCULAR REEDUCATION: CPT | Mod: KX | Performed by: PHYSICAL THERAPIST

## 2024-11-14 PROCEDURE — 97110 THERAPEUTIC EXERCISES: CPT | Mod: KX | Performed by: PHYSICAL THERAPIST

## 2024-11-18 ENCOUNTER — THERAPY VISIT (OUTPATIENT)
Dept: PHYSICAL THERAPY | Facility: REHABILITATION | Age: 79
End: 2024-11-18
Payer: MEDICARE

## 2024-11-18 DIAGNOSIS — G20.C ATYPICAL PARKINSONISM (H): Primary | ICD-10-CM

## 2024-11-18 PROCEDURE — 97110 THERAPEUTIC EXERCISES: CPT | Mod: KX | Performed by: PHYSICAL THERAPIST

## 2024-11-18 PROCEDURE — 97530 THERAPEUTIC ACTIVITIES: CPT | Mod: KX | Performed by: PHYSICAL THERAPIST

## 2024-11-18 PROCEDURE — 97112 NEUROMUSCULAR REEDUCATION: CPT | Mod: KX | Performed by: PHYSICAL THERAPIST

## 2024-11-21 ENCOUNTER — THERAPY VISIT (OUTPATIENT)
Dept: PHYSICAL THERAPY | Facility: REHABILITATION | Age: 79
End: 2024-11-21
Payer: MEDICARE

## 2024-11-21 DIAGNOSIS — G20.C ATYPICAL PARKINSONISM (H): Primary | ICD-10-CM

## 2024-11-25 ENCOUNTER — THERAPY VISIT (OUTPATIENT)
Dept: PHYSICAL THERAPY | Facility: REHABILITATION | Age: 79
End: 2024-11-25
Payer: MEDICARE

## 2024-11-25 DIAGNOSIS — G20.C ATYPICAL PARKINSONISM (H): Primary | ICD-10-CM

## 2024-11-25 PROCEDURE — 97530 THERAPEUTIC ACTIVITIES: CPT | Mod: GP | Performed by: PHYSICAL THERAPIST

## 2024-11-25 PROCEDURE — 97110 THERAPEUTIC EXERCISES: CPT | Mod: GP | Performed by: PHYSICAL THERAPIST

## 2024-11-25 PROCEDURE — 97112 NEUROMUSCULAR REEDUCATION: CPT | Mod: GP | Performed by: PHYSICAL THERAPIST

## 2024-12-12 ENCOUNTER — THERAPY VISIT (OUTPATIENT)
Dept: OCCUPATIONAL THERAPY | Facility: REHABILITATION | Age: 79
End: 2024-12-12
Payer: MEDICARE

## 2024-12-12 DIAGNOSIS — R41.89 COGNITIVE CHANGES: Primary | ICD-10-CM

## 2025-02-07 ENCOUNTER — MYC MEDICAL ADVICE (OUTPATIENT)
Dept: NEUROLOGY | Facility: CLINIC | Age: 80
End: 2025-02-07
Payer: MEDICARE

## 2025-02-07 NOTE — TELEPHONE ENCOUNTER
Dr. Walton,    Please advise on [employer] message below.    Thank you,  Scarlet Quarles MA on 2/7/2025 at 12:16 PM

## 2025-02-16 ENCOUNTER — HEALTH MAINTENANCE LETTER (OUTPATIENT)
Age: 80
End: 2025-02-16

## 2025-04-16 ENCOUNTER — TRANSCRIBE ORDERS (OUTPATIENT)
Dept: OTHER | Age: 80
End: 2025-04-16

## 2025-04-16 DIAGNOSIS — R53.83 FATIGUE, UNSPECIFIED TYPE: Primary | ICD-10-CM

## 2025-04-21 ENCOUNTER — TRANSCRIBE ORDERS (OUTPATIENT)
Dept: OTHER | Age: 80
End: 2025-04-21

## 2025-04-21 DIAGNOSIS — R47.89 OTHER SPEECH DISTURBANCES: Primary | ICD-10-CM

## 2025-04-21 DIAGNOSIS — G20.A1 PARKINSON'S DISEASE WITHOUT DYSKINESIA, UNSPECIFIED WHETHER MANIFESTATIONS FLUCTUATE (H): ICD-10-CM

## 2025-04-27 ENCOUNTER — APPOINTMENT (OUTPATIENT)
Dept: CT IMAGING | Facility: CLINIC | Age: 80
End: 2025-04-27
Attending: EMERGENCY MEDICINE
Payer: MEDICARE

## 2025-04-27 ENCOUNTER — HOSPITAL ENCOUNTER (EMERGENCY)
Facility: CLINIC | Age: 80
Discharge: HOME | End: 2025-04-27
Attending: EMERGENCY MEDICINE | Admitting: EMERGENCY MEDICINE
Payer: MEDICARE

## 2025-04-27 VITALS
DIASTOLIC BLOOD PRESSURE: 113 MMHG | HEIGHT: 72 IN | WEIGHT: 233 LBS | RESPIRATION RATE: 19 BRPM | SYSTOLIC BLOOD PRESSURE: 183 MMHG | TEMPERATURE: 97.8 F | HEART RATE: 76 BPM | OXYGEN SATURATION: 96 % | BODY MASS INDEX: 31.56 KG/M2

## 2025-04-27 DIAGNOSIS — I48.91 RATE CONTROLLED ATRIAL FIBRILLATION (H): ICD-10-CM

## 2025-04-27 DIAGNOSIS — G20.C PARKINSONISM, UNSPECIFIED PARKINSONISM TYPE (H): ICD-10-CM

## 2025-04-27 DIAGNOSIS — Z79.01 WARFARIN ANTICOAGULATION: ICD-10-CM
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
## 2025-04-27 PROBLEM — I47.29 NSVT (NONSUSTAINED VENTRICULAR TACHYCARDIA) (H): Status: ACTIVE | Noted: 2024-07-08

## 2025-04-27 PROBLEM — G62.9 NEUROPATHY, PERIPHERAL: Status: ACTIVE | Noted: 2022-06-13

## 2025-04-27 PROBLEM — R53.83 FATIGUE: Status: ACTIVE | Noted: 2024-07-08

## 2025-04-27 PROBLEM — F32.A DEPRESSIVE DISORDER: Status: ACTIVE | Noted: 2024-09-20

## 2025-04-27 PROBLEM — Z98.890 S/P LEFT ATRIAL APPENDAGE LIGATION: Status: ACTIVE | Noted: 2025-04-08

## 2025-04-27 PROBLEM — R11.0 NAUSEA: Status: ACTIVE | Noted: 2022-10-28

## 2025-04-27 PROBLEM — Z87.81 HISTORY OF PELVIC FRACTURE: Status: ACTIVE | Noted: 2025-04-03

## 2025-04-27 PROBLEM — I95.1 ORTHOSTATIC HYPOTENSION: Status: ACTIVE | Noted: 2023-09-05

## 2025-04-27 PROBLEM — F32.0 CURRENT MILD EPISODE OF MAJOR DEPRESSIVE DISORDER WITHOUT PRIOR EPISODE: Status: ACTIVE | Noted: 2024-01-02

## 2025-04-27 PROBLEM — G20.A1 PARKINSON'S DISEASE (H): Status: ACTIVE | Noted: 2022-10-04

## 2025-04-27 PROBLEM — R35.1 NOCTURIA: Status: ACTIVE | Noted: 2022-10-04

## 2025-04-27 PROBLEM — N40.1 LOWER URINARY TRACT SYMPTOMS DUE TO BENIGN PROSTATIC HYPERPLASIA: Status: ACTIVE | Noted: 2024-03-06

## 2025-04-27 PROBLEM — G90.9 AUTONOMIC DYSFUNCTION: Status: ACTIVE | Noted: 2024-07-08

## 2025-04-27 PROBLEM — S39.92XA LOWER BACK INJURY: Status: ACTIVE | Noted: 2022-11-11

## 2025-04-27 LAB
ALBUMIN SERPL BCG-MCNC: 4 G/DL (ref 3.5–5.2)
ALP SERPL-CCNC: 62 U/L (ref 40–150)
ALT SERPL W P-5'-P-CCNC: <5 U/L (ref 0–70)
ANION GAP SERPL CALCULATED.3IONS-SCNC: 7 MMOL/L (ref 7–15)
AST SERPL W P-5'-P-CCNC: 18 U/L (ref 0–45)
ATRIAL RATE - MUSE: 61 BPM
BASOPHILS # BLD AUTO: 0 10E3/UL (ref 0–0.2)
BASOPHILS NFR BLD AUTO: 0 %
BILIRUB DIRECT SERPL-MCNC: 0.43 MG/DL (ref 0–0.3)
BILIRUB SERPL-MCNC: 1.2 MG/DL
BUN SERPL-MCNC: 18.8 MG/DL (ref 8–23)
CALCIUM SERPL-MCNC: 9.9 MG/DL (ref 8.8–10.4)
CHLORIDE SERPL-SCNC: 107 MMOL/L (ref 98–107)
CREAT SERPL-MCNC: 1.2 MG/DL (ref 0.67–1.17)
DIASTOLIC BLOOD PRESSURE - MUSE: 88 MMHG
EGFRCR SERPLBLD CKD-EPI 2021: 62 ML/MIN/1.73M2
EOSINOPHIL # BLD AUTO: 0 10E3/UL (ref 0–0.7)
EOSINOPHIL NFR BLD AUTO: 1 %
ERYTHROCYTE [DISTWIDTH] IN BLOOD BY AUTOMATED COUNT: 12.9 % (ref 10–15)
GLUCOSE SERPL-MCNC: 126 MG/DL (ref 70–99)
HCO3 SERPL-SCNC: 29 MMOL/L (ref 22–29)
HCT VFR BLD AUTO: 46.6 % (ref 40–53)
HGB BLD-MCNC: 16 G/DL (ref 13.3–17.7)
IMM GRANULOCYTES # BLD: 0 10E3/UL
IMM GRANULOCYTES NFR BLD: 1 %
INR PPP: 2.13 (ref 0.85–1.15)
INTERPRETATION ECG - MUSE: NORMAL
LIPASE SERPL-CCNC: 64 U/L (ref 13–60)
LYMPHOCYTES # BLD AUTO: 0.7 10E3/UL (ref 0.8–5.3)
LYMPHOCYTES NFR BLD AUTO: 15 %
MAGNESIUM SERPL-MCNC: 2 MG/DL (ref 1.7–2.3)
MCH RBC QN AUTO: 32.2 PG (ref 26.5–33)
MCHC RBC AUTO-ENTMCNC: 34.3 G/DL (ref 31.5–36.5)
MCV RBC AUTO: 94 FL (ref 78–100)
MONOCYTES # BLD AUTO: 0.5 10E3/UL (ref 0–1.3)
MONOCYTES NFR BLD AUTO: 10 %
NEUTROPHILS # BLD AUTO: 3.7 10E3/UL (ref 1.6–8.3)
NEUTROPHILS NFR BLD AUTO: 73 %
NRBC # BLD AUTO: 0 10E3/UL
NRBC BLD AUTO-RTO: 0 /100
P AXIS - MUSE: NORMAL DEGREES
PLATELET # BLD AUTO: 102 10E3/UL (ref 150–450)
POTASSIUM SERPL-SCNC: 4 MMOL/L (ref 3.4–5.3)
PR INTERVAL - MUSE: NORMAL MS
PROT SERPL-MCNC: 6.2 G/DL (ref 6.4–8.3)
QRS DURATION - MUSE: 100 MS
QT - MUSE: 402 MS
QTC - MUSE: 434 MS
R AXIS - MUSE: -31 DEGREES
RBC # BLD AUTO: 4.97 10E6/UL (ref 4.4–5.9)
SODIUM SERPL-SCNC: 143 MMOL/L (ref 135–145)
SYSTOLIC BLOOD PRESSURE - MUSE: 150 MMHG
T AXIS - MUSE: 16 DEGREES
TSH SERPL DL<=0.005 MIU/L-ACNC: 2.45 UIU/ML (ref 0.3–4.2)
VENTRICULAR RATE- MUSE: 70 BPM
WBC # BLD AUTO: 5 10E3/UL (ref 4–11)

## 2025-04-27 PROCEDURE — 99285 EMERGENCY DEPT VISIT HI MDM: CPT | Mod: 25

## 2025-04-27 PROCEDURE — 70450 CT HEAD/BRAIN W/O DYE: CPT

## 2025-04-27 PROCEDURE — 85004 AUTOMATED DIFF WBC COUNT: CPT | Performed by: EMERGENCY MEDICINE

## 2025-04-27 PROCEDURE — 83690 ASSAY OF LIPASE: CPT | Performed by: EMERGENCY MEDICINE

## 2025-04-27 PROCEDURE — 82248 BILIRUBIN DIRECT: CPT | Performed by: EMERGENCY MEDICINE

## 2025-04-27 PROCEDURE — 93005 ELECTROCARDIOGRAM TRACING: CPT | Performed by: EMERGENCY MEDICINE

## 2025-04-27 PROCEDURE — 83735 ASSAY OF MAGNESIUM: CPT | Performed by: EMERGENCY MEDICINE

## 2025-04-27 PROCEDURE — 84443 ASSAY THYROID STIM HORMONE: CPT | Performed by: EMERGENCY MEDICINE

## 2025-04-27 PROCEDURE — 36415 COLL VENOUS BLD VENIPUNCTURE: CPT | Performed by: EMERGENCY MEDICINE

## 2025-04-27 PROCEDURE — 85610 PROTHROMBIN TIME: CPT | Performed by: EMERGENCY MEDICINE

## 2025-04-27 PROCEDURE — 80048 BASIC METABOLIC PNL TOTAL CA: CPT | Performed by: EMERGENCY MEDICINE

## 2025-04-27 ASSESSMENT — ACTIVITIES OF DAILY LIVING (ADL)
ADLS_ACUITY_SCORE: 54
ADLS_ACUITY_SCORE: 54

## 2025-04-27 ASSESSMENT — COLUMBIA-SUICIDE SEVERITY RATING SCALE - C-SSRS
1. IN THE PAST MONTH, HAVE YOU WISHED YOU WERE DEAD OR WISHED YOU COULD GO TO SLEEP AND NOT WAKE UP?: NO
6. HAVE YOU EVER DONE ANYTHING, STARTED TO DO ANYTHING, OR PREPARED TO DO ANYTHING TO END YOUR LIFE?: NO
2. HAVE YOU ACTUALLY HAD ANY THOUGHTS OF KILLING YOURSELF IN THE PAST MONTH?: NO

## 2025-04-27 NOTE — ED TRIAGE NOTES
PT arrives via [facility] after losing his balance and falling in the shower. PT reports he has been recovering since placement of a watchman. On Warfarin. Denies pain. No c-spine tenderness. Abrasion to the back of his left arm. Denies c-spine tenderness.

## 2025-04-27 NOTE — ED PROVIDER NOTES
EMERGENCY DEPARTMENT ENCOUNTER      NAME: Carlos Ch  AGE: 79 year old male  YOB: 1945  MRN: [id number]  EVALUATION DATE & TIME: 4/27/2025 10:26 AM    PCP: Matt Ragland    ED PROVIDER: José Perez M.D.      Chief Complaint   Patient presents with    Fall         IMPRESSION  1. Ground-level fall    2. Warfarin anticoagulation    3. Rate controlled atrial fibrillation (H)    4. Parkinsonism, unspecified Parkinsonism type (H)        PLAN  - close PCP follow up  - discharge to home    ED COURSE & MEDICAL DECISION MAKING    ED Course as of 04/27/25 1222   Sun Apr 27, 2025   1032 79yoM with history of paroxysmal a-fib (takes warfarin, s/p Watchman 3 weeks ago), Parkinsonism (uses a walker), chronic ITP, autoimmune hepatitis, HTN, HLD presenting per EMS from home for evaluation after a fall. Reports he was feeling fine and getting out of the shower at home when he lost his balance and fell to the ground. Hit right elbow on the ground; no pain here though. Denies head injury or LOC. Denies any pains resulting from the fall. States he feels fine. Was unable to get up off the ground though and wife unable to get him up either; EMS thus called. Brought to the ED given fall while on warfarin. Patient notes that he would not usually be able to get himself up off the ground given his Parkinsonism; uses a walker at baseline to get around. Has no complaints here now and states he feels fine.    Normal vitals on presentation. Calm on exam with no suggestion of head trauma, no reproducible tenderness including no tenderness or pain with ROM of right elbow, nonfocal neuro exam with mild masked fascies, clear lungs, normal work of breathing, benign abdomen, 1+ nontender pitting edema up to mid shins (chronic & baseline per patient).    Low clinical concern for injury from fall today. Still, given warfarin will obtain CT head. Will obtain screening blood work as well. Triage EKG with rate-controlled  a-fib; not new for the patient. Patient comfortable with this plan; no further questions at this time.   1127 CT head independently reviewed & interpreted by me: no ICH or skull fracture.     Labs & CT head unremarkable with no acute abnormality or explanatory pathology. Patient still asymptomatic on recheck. Wife at bedside and states he is acting normally. Able to use walker as his baseline. Doubt ongoing emergent life-threatening etiology; ok for outpatient follow up. Patient & wife comfortable with discharge home at this time. Return precautions and need for PCP follow up discussed and understood. No further questions at the time of discharge.      --------------------------------------------------------------------------------   --------------------------------------------------------------------------------     10:34 AM I met with the patient for the initial interview and physical examination. Discussed plan for treatment and workup in the ED.        This patient involved a high degree of complexity in medical decision making, as significant risks were present and assessed. Recent encounters & results in medical record reviewed by me.    All workup (i.e. any EKG/labs/imaging as per charting below) reviewed and independently interpreted by me. See respective sections for details.    Broad differential considered for this patient, including but not limited to:  ICH, skull fracture, elbow injury, other traumatic injury, mechanical fall, other etiology to fall      See additional MDM below if interested.      MEDICATIONS GIVEN IN THE EMERGENCY DEPARTMENT  Medications - No data to display    NEW PRESCRIPTIONS STARTED AT TODAY'S ER VISIT  Discharge Medication List as of 4/27/2025 11:54 AM        CONTINUE these medications which have NOT CHANGED    Details   acetaminophen (TYLENOL) 325 MG tablet Take 325-650 mg by mouth every 6 hours as needed for mild pain, Historical      atorvastatin (LIPITOR) 40 MG tablet Take 40  mg by mouth At Bedtime, Historical      azaTHIOprine (IMURAN) 50 MG tablet Take 50 mg by mouth daily, Historical      calcium polycarbophil (FIBERCON) 625 MG tablet Take 2 tablets by mouth daily, Historical      carbidopa-levodopa (SINEMET)  MG tablet For 2 weeks: take 1 tabs three times a day (TID), then take 2 tabs thee times a day going forward., Disp-90 tablet, R-11, E-Prescribe      finasteride (PROSCAR) 5 MG tablet Take 5 mg by mouth At Bedtime, Historical      lisinopril (ZESTRIL) 10 MG tablet Take 10 mg by mouth daily, Historical      metoprolol succinate ER (TOPROL-XL) 25 MG 24 hr tablet Take 12.5 mg by mouth daily, Historical      multivitamin w/minerals (THERA-VIT-M) tablet Take 1 tablet by mouth daily, Historical      ondansetron (ZOFRAN) 4 MG tablet Take by mouth every 8 hours as needed for nausea, Historical      oxyCODONE (ROXICODONE) 5 MG tablet Take 0.5-1 tablets (2.5-5 mg) by mouth every 6 hours as needed for severe pain, Disp-7 tablet, R-0, Local Print      senna-docusate (SENOKOT-S/PERICOLACE) 8.6-50 MG tablet Take 1 tablet by mouth 2 times daily as needed for constipation, Historical      terazosin (HYTRIN) 1 MG capsule Take 1 mg by mouth At Bedtime, Historical      WARFARIN SODIUM PO 11/21/22 INR 2.65  Cont 5mg M-W-F and 7.5mg AOD.  Next INR 11/28/22.   11/16/22 INR 2.78  Take 5mg M-W-F and 7.5mg AOD.  Next INR 11/21/22.  , Historical                 =================================================================      HPI  Use of : N/A         Carloschristiane Ch is a 79 year old male with a pertinent history of HTN, PAF, Parkinson's disease, chronic ITP who presents to this ED for evaluation of fall.    Patient endorses he lost his balance and fell while getting out of the shower. Denies any pain or symptoms prior to the fall. Patient hit the floor and his left elbow on the ground. Wife called the ambulance. History of multiple falls and is usually unable to get up. History of  Parkinson's disease. Patient normally uses a walker at home. Some prior constipation but this has resolved. Patient is on Warfarin for another 3 weeks. Denies any pain currently. No new leg swelling. No other complaints or concerns at this time.       --------------- MEDICAL HISTORY ---------------  PAST MEDICAL HISTORY:  Reviewed independently by me.  Past Medical History:   Diagnosis Date    Tachy-wesly syndrome (H)      Patient Active Problem List   Diagnosis    Abnormal LFTs    Anticoagulation monitoring, INR range 2-3    Arthritis    Autoimmune hepatitis (H)    Benign essential hypertension    BPH with obstruction/lower urinary tract symptoms    Chronic ITP (idiopathic thrombocytopenic purpura) (H)    Coronary atherosclerosis    Diverticulitis    Diverticulosis of colon    Erythrocytosis    Pacemaker    PAF (paroxysmal atrial fibrillation) (H)    Polyclonal gammopathy    Portal vein aneurysm    Pure hypercholesterolemia    Tachy-wesly syndrome (H)    Urinary urgency    Syncope    Closed nondisplaced fracture of pelvis, unspecified part of pelvis, initial encounter (H)    Iliac artery aneurysm, right    Autonomic dysfunction    Current mild episode of major depressive disorder without prior episode    Depressive disorder    History of pelvic fracture    Lower back injury    Lower urinary tract symptoms due to benign prostatic hyperplasia    Nausea    Neuropathy, peripheral    Nocturia    NSVT (nonsustained ventricular tachycardia) (H)    Orthostatic hypotension    Parkinson's disease (H)    S/P left atrial appendage ligation    Fatigue       PAST SURGICAL HISTORY:  Reviewed independently by me.  Past Surgical History:   Procedure Laterality Date    CV TEMPORARY PACEMAKER INSERTION         CURRENT MEDICATIONS:    Reviewed independently by me.  No current facility-administered medications for this encounter.    Current Outpatient Medications:     acetaminophen (TYLENOL) 325 MG tablet, Take 325-650 mg by mouth  every 6 hours as needed for mild pain, Disp: , Rfl:     atorvastatin (LIPITOR) 40 MG tablet, Take 40 mg by mouth At Bedtime, Disp: , Rfl:     azaTHIOprine (IMURAN) 50 MG tablet, Take 50 mg by mouth daily, Disp: , Rfl:     calcium polycarbophil (FIBERCON) 625 MG tablet, Take 2 tablets by mouth daily, Disp: , Rfl:     carbidopa-levodopa (SINEMET)  MG tablet, For 2 weeks: take 1 tabs three times a day (TID), then take 2 tabs thee times a day going forward., Disp: 90 tablet, Rfl: 11    finasteride (PROSCAR) 5 MG tablet, Take 5 mg by mouth At Bedtime, Disp: , Rfl:     lisinopril (ZESTRIL) 10 MG tablet, Take 10 mg by mouth daily, Disp: , Rfl:     metoprolol succinate ER (TOPROL-XL) 25 MG 24 hr tablet, Take 12.5 mg by mouth daily, Disp: , Rfl:     multivitamin w/minerals (THERA-VIT-M) tablet, Take 1 tablet by mouth daily, Disp: , Rfl:     ondansetron (ZOFRAN) 4 MG tablet, Take by mouth every 8 hours as needed for nausea, Disp: , Rfl:     oxyCODONE (ROXICODONE) 5 MG tablet, Take 0.5-1 tablets (2.5-5 mg) by mouth every 6 hours as needed for severe pain, Disp: 7 tablet, Rfl: 0    senna-docusate (SENOKOT-S/PERICOLACE) 8.6-50 MG tablet, Take 1 tablet by mouth 2 times daily as needed for constipation, Disp: , Rfl:     terazosin (HYTRIN) 1 MG capsule, Take 1 mg by mouth At Bedtime, Disp: , Rfl:     WARFARIN SODIUM PO, 11/21/22 INR 2.65  Cont 5mg M-W-F and 7.5mg AOD.  Next INR 11/28/22.   11/16/22 INR 2.78  Take 5mg M-W-F and 7.5mg AOD.  Next INR 11/21/22.  , Disp: , Rfl:     ALLERGIES:  Reviewed independently by me.  Allergies   Allergen Reactions    Clopidogrel Rash    Celecoxib Rash    Sulfa Antibiotics Rash       FAMILY HISTORY:  Reviewed independently by me.  Family History   Problem Relation Age of Onset    Diabetes Brother     Coronary Artery Disease Brother          SOCIAL HISTORY:   Reviewed independently by me.  Social History     Socioeconomic History    Marital status:    Tobacco Use    Smoking status:  Former     Types: Cigarettes    Smokeless tobacco: Never     Social Drivers of Health     Financial Resource Strain: Low Risk  (1/1/2025)    Received from [facility] Replaced by [facility]    Financial Resource Strain     Difficulty of Paying Living Expenses: 3   Food Insecurity: No Food Insecurity (1/1/2025)    Received from [facility]    Food Insecurity     Do you worry your food will run out before you are able to buy more?: 1   Transportation Needs: No Transportation Needs (1/1/2025)    [facility]    Transportation Needs     Does lack of transportation keep you from medical appointments?: 1     Does lack of transportation keep you from work, meetings or getting things that you need?: 1   Physical Activity: Inactive (9/26/2023)    Received from [facility], [facility]    Exercise Vital Sign     Days of Exercise per Week: 0 days     Minutes of Exercise per Session: 0 min   Social Connections: Socially Integrated (1/1/2025)    Received from [facility] Replaced by [facility]    Social Connections     Do you often feel lonely or isolated from those around you?: 0   Housing Stability: Low Risk  (1/1/2025)    Received from [facility] Replaced by [facility]    Housing Stability     What is your housing situation today?: 1       --------------- PHYSICAL EXAM ---------------  Nursing notes and vitals independently reviewed by me.  VITALS:  Vitals:    04/27/25 1033 04/27/25 1100 04/27/25 1155 04/27/25 1156   BP: (!) 150/88 130/78  (!) 183/113   Pulse: 71 68 68 76   Resp: 17 18 19    Temp:       SpO2: 93% 92% 96%    Weight:       Height:           PHYSICAL EXAM:    General:  alert, interactive, no distress  Eyes:  conjunctivae clear, conjugate gaze  HENT:  no scalp tenderness or visual evidence of trauma, midface stable and nontender, no raccoon eyes or rae sign, TMs clear bilaterally, nose with no  rhinorrhea, oropharynx clear  Neck:  no meningismus, no c-spine tenderness with painless active ROM intact  Cardiovascular:  HR 70s during exam, irregular rhythm, no murmurs, brisk cap refill  Chest:  no chest wall tenderness  Pulmonary:  no stridor, normal phonation, normal work of breathing, clear lungs bilaterally  Abdomen:  soft, nondistended, nontender  :  no CVA tenderness  Back:  no midline spinal tenderness  Musculoskeletal:    RUE: elbow nontender with full active painless ROM intact & overlying skin intact; no tenderness or pain with ROM to shoulder/wrist/fingers with distal CMS intact  LUE:  atraumatic with painless active ROM intact & distal CMS intact  BLE:  atraumatic with painless active ROM intact & distal CMS intact; 1+ nontender pitting edema up to mid shins bilaterally (chronic & baseline per patient)  Skin:  warm, dry, no rash  Neuro:  awake, alert, answers questions appropriately, follows commands, moves all limbs, CN 2-12 intact, negative pronator drift, 5/5 strength to all extremities with sensation to light touch intact, no tremor, mild masked facies  Psych:  calm, normal affect      --------------- RESULTS ---------------  EKG:    Reviewed and independently interpreted by me.  - a-fib at 70bpm with PVCs, no ST or T wave changes, normal intervals  - unchanged from prior on 9/20/24  My read.    LAB:  Reviewed and independently interpreted by me.  Results for orders placed or performed during the hospital encounter of 04/27/25   CT Head w/o Contrast    Impression    IMPRESSION:  1.  No CT evidence for acute intracranial process.    2.  Brain atrophy and presumed chronic microvascular ischemic changes as above.    3.  No subarachnoid, subdural or epidural hemorrhage.    4.  No fractures.    5.  No changes from prior head CT 03/28/2022.     Result Value Ref Range    INR 2.13 (H) 0.85 - 1.15   Basic metabolic panel   Result Value Ref Range    Sodium 143 135 - 145 mmol/L    Potassium 4.0 3.4 -  5.3 mmol/L    Chloride 107 98 - 107 mmol/L    Carbon Dioxide (CO2) 29 22 - 29 mmol/L    Anion Gap 7 7 - 15 mmol/L    Urea Nitrogen 18.8 8.0 - 23.0 mg/dL    Creatinine 1.20 (H) 0.67 - 1.17 mg/dL    GFR Estimate 62 >60 mL/min/1.73m2    Calcium 9.9 8.8 - 10.4 mg/dL    Glucose 126 (H) 70 - 99 mg/dL   Hepatic function panel   Result Value Ref Range    Protein Total 6.2 (L) 6.4 - 8.3 g/dL    Albumin 4.0 3.5 - 5.2 g/dL    Bilirubin Total 1.2 <=1.2 mg/dL    Alkaline Phosphatase 62 40 - 150 U/L    AST 18 0 - 45 U/L    ALT <5 0 - 70 U/L    Bilirubin Direct 0.43 (H) 0.00 - 0.30 mg/dL   Result Value Ref Range    Lipase 64 (H) 13 - 60 U/L   Result Value Ref Range    Magnesium 2.0 1.7 - 2.3 mg/dL   TSH with free T4 reflex   Result Value Ref Range    TSH 2.45 0.30 - 4.20 uIU/mL   CBC with platelets and differential   Result Value Ref Range    WBC Count 5.0 4.0 - 11.0 10e3/uL    RBC Count 4.97 4.40 - 5.90 10e6/uL    Hemoglobin 16.0 13.3 - 17.7 g/dL    Hematocrit 46.6 40.0 - 53.0 %    MCV 94 78 - 100 fL    MCH 32.2 26.5 - 33.0 pg    MCHC 34.3 31.5 - 36.5 g/dL    RDW 12.9 10.0 - 15.0 %    Platelet Count 102 (L) 150 - 450 10e3/uL    % Neutrophils 73 %    % Lymphocytes 15 %    % Monocytes 10 %    % Eosinophils 1 %    % Basophils 0 %    % Immature Granulocytes 1 %    NRBCs per 100 WBC 0 <1 /100    Absolute Neutrophils 3.7 1.6 - 8.3 10e3/uL    Absolute Lymphocytes 0.7 (L) 0.8 - 5.3 10e3/uL    Absolute Monocytes 0.5 0.0 - 1.3 10e3/uL    Absolute Eosinophils 0.0 0.0 - 0.7 10e3/uL    Absolute Basophils 0.0 0.0 - 0.2 10e3/uL    Absolute Immature Granulocytes 0.0 <=0.4 10e3/uL    Absolute NRBCs 0.0 10e3/uL   ECG 12-LEAD WITH MUSE (LHE)   Result Value Ref Range    Systolic Blood Pressure 150 mmHg    Diastolic Blood Pressure 88 mmHg    Ventricular Rate 70 BPM    Atrial Rate 61 BPM    SC Interval  ms    QRS Duration 100 ms     ms    QTc 434 ms    P Axis  degrees    R AXIS -31 degrees    T Axis 16 degrees    Interpretation ECG        Undetermined rhythm  Left axis deviation  Incomplete right bundle branch block  T wave abnormality, consider anterior ischemia  Abnormal ECG  When compared with ECG of 20-Sep-2024 13:46,  Current undetermined rhythm precludes rhythm comparison, needs review  T wave inversion now evident in Anterior leads  Confirmed by SEE ED PROVIDER NOTE FOR, ECG INTERPRETATION ([id number]),  DAY MONTELONGO ([id number]) on 4/27/2025 10:39:03 AM         RADIOLOGY:  Reviewed and independently interpreted by me. Please see official radiology report.  Recent Results (from the past 24 hours)   CT Head w/o Contrast    Narrative    EXAM: CT HEAD WITHOUT CONTRAST  LOCATION: [facility]  DATE: 04/27/2025    INDICATION: Fall, on warfarin. Headache.  COMPARISON: Brain MR 12/29/2022 head CT 03/28/2022 also reviewed.  TECHNIQUE: Routine CT Head without IV contrast. Multiplanar reformats. Dose reduction techniques were used.    FINDINGS:  INTRACRANIAL CONTENTS: No intracranial hemorrhage, extra-axial collection, or mass effect.  No CT evidence of acute infarct. Mild presumed chronic small vessel ischemic changes. Mild generalized volume loss. No hydrocephalus. Corpus callosum is normal.   Cerebellar tonsillar position is satisfactory. No sella or suprasellar mass/hemorrhage. Vascular calcification. Nothing for subarachnoid, subdural or epidural blood products. History of trauma and anticoagulation usage reported.     VISUALIZED ORBITS/SINUSES/MASTOIDS: No intraorbital abnormality. No paranasal sinus mucosal disease. No middle ear or mastoid effusion.    BONES/SOFT TISSUES: No scalp hematoma. No skull fracture.      Impression    IMPRESSION:  1.  No CT evidence for acute intracranial process.    2.  Brain atrophy and presumed chronic microvascular ischemic changes as above.    3.  No subarachnoid, subdural or epidural hemorrhage.    4.  No fractures.    5.  No changes from prior head CT 03/28/2022.           PROCEDURES:    Procedures   --------------------------------------------------------------------------------   Cardiac telemetry monitoring ordered by me secondary to the patient's history of fall & weakness and to monitor the patient for dysrhythmia. Reviewed & independently interpreted by me. Revealed a-fib with rates 60s-80s.  --------------------------------------------------------------------------------                   --------------- ADDITIONAL MDM ---------------  Sepsis/STEMI/Stroke Measures:  None    MIPS (CTPE, dental pain, Romero, sinusitis, asthma/COPD, head trauma):  Adult Minor Head Trauma:Age 65 years or older and Currently taking anticoagulant medications: warfarin or other novel anticoagulant medications    History:  - I considered systemic symptoms of the presenting illness.  - Supplemental history from:       -- patient, family (wife), EMS  - External Record(s) reviewed:       -- Inpatient/outpatient record (clinic visit 4/22/25), prior labs (blood 4/22/25), prior imaging (CTA abdomen/pelvis 2/4/25)       -- see above ED course & MDM for further details    Workup:  - Chart documentation above includes differential considered and my independent interpretation any EKGs, labs tests, and/or imaging  - emergent/severe conditions considered and evaluated for: see above differential & MDM  - In additional to work up documented, I considered the following work up:       -- CT chest/abdomen/pelvis       -- see above ED course & MDM for further details    Independent Interpretation:  - Independent interpretation of ECG and images noted in documentation, when applicable.    External Consultation:  - Discussion of management with another provider:       -- ED pharmacist re: meds       -- see above ED course & MDM for additional    Complicating Factors:  - Care impacted by chronic illness:       -- see above MDM, past medical history, & problem list    Disposition Considerations:  - Discharge       -- I considered  escalation of care with admission to the hospital, but ultimately discharged the patient given reassuring workup & exam, comfortable with discharge       -- I recommended the patient continue their current prescription strength medication(s) as charted above in current medications list       -- I considered prescription pain medication, comfortable without this       -- I recommended over-the-counter medication(s) as charted above & in discharge instructions             I, Barbara Vaughn, am serving as a scribe to document services personally performed by Dr. José Perez based on my observation and the provider's statements to me. I, José Perez MD attest that Barbara Vaughn is acting in a scribe capacity, has observed my performance of the services and has documented them in accordance with my direction.      José Perez MD  04/27/25  Emergency Medicine  [facility] EMERGENCY ROOM  [address] [address]  [phone]  Dept: [phone]     José Perez MD  04/27/25 1228

## 2025-04-27 NOTE — DISCHARGE INSTRUCTIONS
Continue all of your previously-prescribed medications.    Follow up with your Primary Care provider in 2 days for a recheck.    Return to the Emergency Department for any difficulty breathing, persistent vomiting, new or worsening symptoms, or any other concerns.

## 2025-06-09 ENCOUNTER — THERAPY VISIT (OUTPATIENT)
Dept: PHYSICAL THERAPY | Facility: REHABILITATION | Age: 80
End: 2025-06-09
Attending: FAMILY MEDICINE
Payer: MEDICARE

## 2025-06-09 DIAGNOSIS — R29.898 LEG WEAKNESS, BILATERAL: ICD-10-CM

## 2025-06-09 DIAGNOSIS — Z74.09 IMPAIRED FUNCTIONAL MOBILITY, BALANCE, GAIT, AND ENDURANCE: ICD-10-CM

## 2025-06-09 DIAGNOSIS — G20.A1 PARKINSON'S DISEASE WITHOUT DYSKINESIA OR FLUCTUATING MANIFESTATIONS (H): Primary | ICD-10-CM

## 2025-06-09 PROCEDURE — 97110 THERAPEUTIC EXERCISES: CPT | Mod: GP | Performed by: PHYSICAL THERAPIST

## 2025-06-09 PROCEDURE — 97161 PT EVAL LOW COMPLEX 20 MIN: CPT | Mod: GP | Performed by: PHYSICAL THERAPIST

## 2025-06-09 NOTE — PROGRESS NOTES
PHYSICAL THERAPY EVALUATION  Type of Visit: Evaluation       Fall Risk Screen:  Have you fallen 2 or more times in the past year?: Yes  Have you fallen and had an injury in the past year?: Yes  Is patient receiving Physical Therapy Services?: Yes    Subjective   Pt presents to PT with concerns for increased fatigue and weakness because he got off his routine of exercises.  He had a Watchman procedure on April 2025 and was restricted with his activity.  He had not been very good about the exercises previously.  He would like to get back to working on his Parkinson's exercises, strength and balance.  He had a fall about 6 weeks ago.  He is not strong enough to get up and needed the ambulance to come.  Using his walker all the time.  No other exercise.          Presenting condition or subjective complaint: balance  Date of onset: 04/01/25    Relevant medical history: Bladder or bowel problems; Dizziness; High blood pressure   Dates & types of surgery: watan 4/2025    Prior diagnostic imaging/testing results: CT scan     Prior therapy history for the same diagnosis, illness or injury: Yes physical therapy    Prior Level of Function  Transfers: Assistive equipment  Ambulation: Assistive equipment  ADL: Assistive equipment    Living Environment  Social support: With a significant other or spouse   Type of home: House   Stairs to enter the home: No       Ramp: No   Stairs inside the home: No       Help at home: Home management tasks (cooking, cleaning); Medication and/or finances; Assist for driving and community activities  Equipment owned: Walker; Walker with wheels     Employment: No    Hobbies/Interests:      Patient goals for therapy: walk better    Pain assessment: Pain denied     Objective      Cognitive Status Examination  Personal Safety and Judgement: Intact  Memory: Intact    OBSERVATION: Forward leaning on walker, slow movement overall     INTEGUMENTARY: Intact    POSTURE: Sitting Posture: Rounded shoulders,  Forward head    PALPATION: NA    RANGE OF MOTION: LE ROM WFL  UE ROM WFL    STRENGTH:   Pain: - none + mild ++ moderate +++ severe  Strength Scale: 0-5/5 Left Right   Hip Flexion 4 4   Hip Abduction 4 4   Hip Adduction 4 4   Knee Flexion 4 4   Knee Extension 4 4   Ankle DF  4+ 4+   Ankle PF 4+ 4+     BED MOBILITY: Independent    TRANSFERS: Independent, 2 WW     GAIT:   Level of [place]: Independent, 2 WW  Assistive Device(s): Walker (front wheeled)  Gait Deviations: Antalgic  Base of support increased  Stance time decreased  Stride length decreased  Yoly decreased    BALANCE  SPECIAL TESTS  5 Times Sit-to-Stand (5TSTS)  22 seconds  (end of last episode was 13.87 seconds)      Timed Up and Go (TUG) - sec 31.6 sec avg; no device    Cog Timed Up and Go (TUG) - sec 33.825 sec avg; no device    Single Leg Stance Right (sec) <1 sec   Single Leg Stance Left (sec) <1 sec    30 Second Sit to Stand (reps/height) 6x/30 seconds    Mini-BESTest  TOTAL SCORE (out of 28): 13  13/28 (End of last episode was 15/28)          SENSATION: WNL; B LE neuropathy     Assessment & Plan   CLINICAL IMPRESSIONS  Medical Diagnosis: Fatigue, unspecified type    Treatment Diagnosis: LE weakness and decreased stamina with Parkinson's   Impression/Assessment: Pt presents to PT with concern for increase in LE weakness and decreased stamina along with impaired balance.  He also has Parkinson's Disease.  He has done PT before utilizing amplitude based exercises, strengthening and balance with good improvements.  However, he had a Watchman procedure done in early May and the restrictions took him away from his exercises for a bit and he really felt the decline.   He demonstrates overall LE weakness with decreased STS reps. He has decreased gait speed and stability and impaired balance and demonstrates impaired balance based on his MiniBest and TUG scores.  He will benefit from PT to return to addressing his strength, balance, gait and overall  mobility.      Clinical Decision Making (Complexity):  Clinical Presentation: Stable/Uncomplicated  Clinical Presentation Rationale: based on medical and personal factors listed in PT evaluation  Clinical Decision Making (Complexity): Low complexity    PLAN OF CARE  Treatment Interventions:  Interventions: Gait Training, Manual Therapy, Neuromuscular Re-education, Therapeutic Activity, Therapeutic Exercise, Self-Care/Home Management    Long Term Goals     PT Goal 1  Goal Identifier: [place]  Goal Description: Pt will demonstrate readiness for independent sx management and HEP  Rationale: to maximize safety and independence within the home  Target Date: 09/06/25  PT Goal 2  Goal Identifier: STS  Goal Description: Pt will be able to increase his STS reps to >/=8X/30 seconds  Rationale: to maximize safety and independence with self cares  Target Date: 09/06/25  PT Goal 3  Goal Identifier: Mini Best Test  Goal Description: Pt will increase his Mini Best Test to >/=16/28 for improved gait, balance and safety  Target Date: 09/06/25  PT Goal 4  Goal Identifier: Walking stability and stamina  Goal Description: Pt will note improved walking stamina and stability  Goal Progress: goal will be established further after 2 min walk test  Target Date: 09/06/25      Frequency of Treatment: 1x/week  Duration of Treatment: 90 days    Recommended Referrals to Other Professionals: None  Education Assessment:   Learner/Method: Patient;Demonstration;Pictures/Video  Education Comments: Pt educated on POC, pathology, etc    Risks and benefits of evaluation/treatment have been explained.   Patient/Family/caregiver agrees with Plan of Care.     Evaluation Time:        Signing Clinician: Martha Medina PT        [facility]                                                                                   OUTPATIENT PHYSICAL THERAPY      PLAN OF TREATMENT FOR OUTPATIENT REHABILITATION   Patient's Last Name,  First Name, PHILLIP LowryCarlos brown YOB: 1945   Provider's Name   [facility]   Medical Record No.  [id number]     Onset Date: 04/01/25  Start of Care Date: 06/09/25     Medical Diagnosis:  Fatigue, unspecified type      PT Treatment Diagnosis:  LE weakness and decreased stamina with Parkinson's Plan of Treatment  Frequency/Duration: 1x/week/ 90 days    Certification date from 06/09/25 to 09/06/25         See note for plan of treatment details and functional goals     Martha Medina, PT                         I CERTIFY THE NEED FOR THESE SERVICES FURNISHED UNDER        THIS PLAN OF TREATMENT AND WHILE UNDER MY CARE .             Physician Signature               Date    X_____________________________________________________                  Referring Provider:  Justine Marsh    Initial Assessment  See Epic Evaluation- Start of Care Date: 06/09/25

## 2025-06-16 ENCOUNTER — THERAPY VISIT (OUTPATIENT)
Dept: PHYSICAL THERAPY | Facility: REHABILITATION | Age: 80
End: 2025-06-16
Attending: FAMILY MEDICINE
Payer: MEDICARE

## 2025-06-16 DIAGNOSIS — Z74.09 IMPAIRED FUNCTIONAL MOBILITY, BALANCE, GAIT, AND ENDURANCE: ICD-10-CM

## 2025-06-16 DIAGNOSIS — G20.A1 PARKINSON'S DISEASE WITHOUT DYSKINESIA OR FLUCTUATING MANIFESTATIONS (H): ICD-10-CM

## 2025-06-16 DIAGNOSIS — R29.898 LEG WEAKNESS, BILATERAL: Primary | ICD-10-CM

## 2025-06-16 PROCEDURE — 97110 THERAPEUTIC EXERCISES: CPT | Mod: GP | Performed by: PHYSICAL THERAPIST

## 2025-06-16 PROCEDURE — 97112 NEUROMUSCULAR REEDUCATION: CPT | Mod: GP | Performed by: PHYSICAL THERAPIST

## 2025-06-25 ENCOUNTER — THERAPY VISIT (OUTPATIENT)
Dept: PHYSICAL THERAPY | Facility: REHABILITATION | Age: 80
End: 2025-06-25
Payer: MEDICARE

## 2025-06-25 DIAGNOSIS — Z74.09 IMPAIRED FUNCTIONAL MOBILITY, BALANCE, GAIT, AND ENDURANCE: Primary | ICD-10-CM

## 2025-06-25 DIAGNOSIS — G20.C ATYPICAL PARKINSONISM (H): ICD-10-CM

## 2025-06-25 DIAGNOSIS — R29.898 LEG WEAKNESS, BILATERAL: ICD-10-CM

## 2025-06-25 PROCEDURE — 97112 NEUROMUSCULAR REEDUCATION: CPT | Mod: GP | Performed by: PHYSICAL THERAPIST

## 2025-06-25 PROCEDURE — 97110 THERAPEUTIC EXERCISES: CPT | Mod: GP | Performed by: PHYSICAL THERAPIST

## 2025-06-29 ENCOUNTER — HEALTH MAINTENANCE LETTER (OUTPATIENT)
Age: 80
End: 2025-06-29

## 2025-07-10 ENCOUNTER — THERAPY VISIT (OUTPATIENT)
Dept: PHYSICAL THERAPY | Facility: REHABILITATION | Age: 80
End: 2025-07-10
Payer: MEDICARE

## 2025-07-10 DIAGNOSIS — R29.898 LEG WEAKNESS, BILATERAL: ICD-10-CM

## 2025-07-10 DIAGNOSIS — Z74.09 IMPAIRED FUNCTIONAL MOBILITY, BALANCE, GAIT, AND ENDURANCE: ICD-10-CM

## 2025-07-10 DIAGNOSIS — G20.A1 PARKINSON'S DISEASE (H): Primary | ICD-10-CM

## 2025-07-14 ENCOUNTER — THERAPY VISIT (OUTPATIENT)
Dept: PHYSICAL THERAPY | Facility: REHABILITATION | Age: 80
End: 2025-07-14
Payer: MEDICARE

## 2025-07-14 DIAGNOSIS — R29.898 LEG WEAKNESS, BILATERAL: Primary | ICD-10-CM

## 2025-07-14 DIAGNOSIS — G20.A1 PARKINSON'S DISEASE WITHOUT DYSKINESIA OR FLUCTUATING MANIFESTATIONS (H): ICD-10-CM

## 2025-07-14 DIAGNOSIS — G20.C ATYPICAL PARKINSONISM (H): ICD-10-CM

## 2025-07-14 DIAGNOSIS — Z74.09 IMPAIRED FUNCTIONAL MOBILITY, BALANCE, GAIT, AND ENDURANCE: ICD-10-CM

## 2025-07-14 PROCEDURE — 97112 NEUROMUSCULAR REEDUCATION: CPT | Mod: GP | Performed by: PHYSICAL THERAPIST

## 2025-07-14 PROCEDURE — 97110 THERAPEUTIC EXERCISES: CPT | Mod: GP | Performed by: PHYSICAL THERAPIST

## 2025-07-17 ENCOUNTER — THERAPY VISIT (OUTPATIENT)
Dept: PHYSICAL THERAPY | Facility: REHABILITATION | Age: 80
End: 2025-07-17
Payer: MEDICARE

## 2025-07-17 DIAGNOSIS — G20.A1 PARKINSON'S DISEASE WITHOUT DYSKINESIA OR FLUCTUATING MANIFESTATIONS (H): Primary | ICD-10-CM

## 2025-07-17 DIAGNOSIS — R29.898 LEG WEAKNESS, BILATERAL: ICD-10-CM

## 2025-07-17 DIAGNOSIS — Z74.09 IMPAIRED FUNCTIONAL MOBILITY, BALANCE, GAIT, AND ENDURANCE: ICD-10-CM

## 2025-07-24 ENCOUNTER — THERAPY VISIT (OUTPATIENT)
Dept: PHYSICAL THERAPY | Facility: REHABILITATION | Age: 80
End: 2025-07-24
Payer: MEDICARE

## 2025-07-24 DIAGNOSIS — R29.898 LEG WEAKNESS, BILATERAL: ICD-10-CM

## 2025-07-24 DIAGNOSIS — Z74.09 IMPAIRED FUNCTIONAL MOBILITY, BALANCE, GAIT, AND ENDURANCE: ICD-10-CM

## 2025-07-24 DIAGNOSIS — G20.A1 PARKINSON'S DISEASE WITHOUT DYSKINESIA OR FLUCTUATING MANIFESTATIONS (H): Primary | ICD-10-CM

## 2025-07-28 ENCOUNTER — THERAPY VISIT (OUTPATIENT)
Dept: PHYSICAL THERAPY | Facility: REHABILITATION | Age: 80
End: 2025-07-28
Payer: MEDICARE

## 2025-07-28 DIAGNOSIS — Z74.09 IMPAIRED FUNCTIONAL MOBILITY, BALANCE, GAIT, AND ENDURANCE: ICD-10-CM

## 2025-07-28 DIAGNOSIS — R29.898 LEG WEAKNESS, BILATERAL: Primary | ICD-10-CM

## 2025-07-28 DIAGNOSIS — G20.A1 PARKINSON'S DISEASE WITHOUT DYSKINESIA OR FLUCTUATING MANIFESTATIONS (H): ICD-10-CM

## 2025-07-28 PROCEDURE — 97110 THERAPEUTIC EXERCISES: CPT | Mod: GP | Performed by: PHYSICAL THERAPIST

## 2025-07-28 PROCEDURE — 97112 NEUROMUSCULAR REEDUCATION: CPT | Mod: GP | Performed by: PHYSICAL THERAPIST

## 2025-07-31 ENCOUNTER — THERAPY VISIT (OUTPATIENT)
Dept: PHYSICAL THERAPY | Facility: REHABILITATION | Age: 80
End: 2025-07-31
Payer: MEDICARE

## 2025-07-31 DIAGNOSIS — G20.A1 PARKINSON'S DISEASE WITHOUT DYSKINESIA OR FLUCTUATING MANIFESTATIONS (H): ICD-10-CM

## 2025-07-31 DIAGNOSIS — Z74.09 IMPAIRED FUNCTIONAL MOBILITY, BALANCE, GAIT, AND ENDURANCE: ICD-10-CM

## 2025-07-31 DIAGNOSIS — R29.898 LEG WEAKNESS, BILATERAL: Primary | ICD-10-CM

## 2025-08-04 ENCOUNTER — THERAPY VISIT (OUTPATIENT)
Dept: PHYSICAL THERAPY | Facility: REHABILITATION | Age: 80
End: 2025-08-04
Payer: MEDICARE

## 2025-08-04 DIAGNOSIS — G20.A1 PARKINSON'S DISEASE WITHOUT DYSKINESIA OR FLUCTUATING MANIFESTATIONS (H): Primary | ICD-10-CM

## 2025-08-04 DIAGNOSIS — R29.898 LEG WEAKNESS, BILATERAL: ICD-10-CM

## 2025-08-04 DIAGNOSIS — Z74.09 IMPAIRED FUNCTIONAL MOBILITY, BALANCE, GAIT, AND ENDURANCE: ICD-10-CM

## 2025-08-04 PROCEDURE — 97750 PHYSICAL PERFORMANCE TEST: CPT | Mod: GP | Performed by: PHYSICAL THERAPIST

## 2025-08-04 PROCEDURE — 97116 GAIT TRAINING THERAPY: CPT | Mod: GP | Performed by: PHYSICAL THERAPIST

## 2025-08-04 PROCEDURE — 97110 THERAPEUTIC EXERCISES: CPT | Mod: GP | Performed by: PHYSICAL THERAPIST

## 2025-08-07 ENCOUNTER — THERAPY VISIT (OUTPATIENT)
Dept: PHYSICAL THERAPY | Facility: REHABILITATION | Age: 80
End: 2025-08-07
Payer: MEDICARE

## 2025-08-07 DIAGNOSIS — Z74.09 IMPAIRED FUNCTIONAL MOBILITY, BALANCE, GAIT, AND ENDURANCE: ICD-10-CM

## 2025-08-07 DIAGNOSIS — G20.A1 PARKINSON'S DISEASE WITHOUT DYSKINESIA OR FLUCTUATING MANIFESTATIONS (H): ICD-10-CM

## 2025-08-07 DIAGNOSIS — R29.898 LEG WEAKNESS, BILATERAL: Primary | ICD-10-CM

## 2025-08-14 ENCOUNTER — THERAPY VISIT (OUTPATIENT)
Dept: PHYSICAL THERAPY | Facility: REHABILITATION | Age: 80
End: 2025-08-14
Payer: MEDICARE

## 2025-08-14 DIAGNOSIS — G20.A1 PARKINSON'S DISEASE WITHOUT DYSKINESIA OR FLUCTUATING MANIFESTATIONS (H): Primary | ICD-10-CM

## 2025-08-14 DIAGNOSIS — Z74.09 IMPAIRED FUNCTIONAL MOBILITY, BALANCE, GAIT, AND ENDURANCE: ICD-10-CM

## 2025-08-14 DIAGNOSIS — R29.898 LEG WEAKNESS, BILATERAL: ICD-10-CM

## 2025-08-18 ENCOUNTER — THERAPY VISIT (OUTPATIENT)
Dept: PHYSICAL THERAPY | Facility: REHABILITATION | Age: 80
End: 2025-08-18
Payer: MEDICARE

## 2025-08-18 DIAGNOSIS — Z74.09 IMPAIRED FUNCTIONAL MOBILITY, BALANCE, GAIT, AND ENDURANCE: ICD-10-CM

## 2025-08-18 DIAGNOSIS — G20.A1 PARKINSON'S DISEASE WITHOUT DYSKINESIA OR FLUCTUATING MANIFESTATIONS (H): Primary | ICD-10-CM

## 2025-08-18 DIAGNOSIS — R47.89 VOICE PRODUCTION PROBLEM: ICD-10-CM

## 2025-08-18 DIAGNOSIS — R29.898 LEG WEAKNESS, BILATERAL: ICD-10-CM

## 2025-08-18 PROCEDURE — 97110 THERAPEUTIC EXERCISES: CPT | Mod: GP | Performed by: PHYSICAL THERAPIST

## 2025-08-18 PROCEDURE — 97530 THERAPEUTIC ACTIVITIES: CPT | Mod: GP | Performed by: PHYSICAL THERAPIST

## 2025-08-21 ENCOUNTER — THERAPY VISIT (OUTPATIENT)
Dept: PHYSICAL THERAPY | Facility: REHABILITATION | Age: 80
End: 2025-08-21
Payer: MEDICARE

## 2025-08-21 DIAGNOSIS — G20.A1 PARKINSON'S DISEASE WITHOUT DYSKINESIA OR FLUCTUATING MANIFESTATIONS (H): Primary | ICD-10-CM

## 2025-08-21 DIAGNOSIS — Z74.09 IMPAIRED FUNCTIONAL MOBILITY, BALANCE, GAIT, AND ENDURANCE: ICD-10-CM

## 2025-08-21 DIAGNOSIS — R29.898 LEG WEAKNESS, BILATERAL: ICD-10-CM

## 2025-08-25 ENCOUNTER — THERAPY VISIT (OUTPATIENT)
Dept: PHYSICAL THERAPY | Facility: REHABILITATION | Age: 80
End: 2025-08-25
Payer: MEDICARE

## 2025-08-25 DIAGNOSIS — R29.898 LEG WEAKNESS, BILATERAL: ICD-10-CM

## 2025-08-25 DIAGNOSIS — Z74.09 IMPAIRED FUNCTIONAL MOBILITY, BALANCE, GAIT, AND ENDURANCE: ICD-10-CM

## 2025-08-25 DIAGNOSIS — G20.A1 PARKINSON'S DISEASE WITHOUT DYSKINESIA OR FLUCTUATING MANIFESTATIONS (H): Primary | ICD-10-CM

## 2025-08-25 PROCEDURE — 97112 NEUROMUSCULAR REEDUCATION: CPT | Mod: GP | Performed by: PHYSICAL THERAPIST

## 2025-08-25 PROCEDURE — 97116 GAIT TRAINING THERAPY: CPT | Mod: GP | Performed by: PHYSICAL THERAPIST

## 2025-08-25 PROCEDURE — 97110 THERAPEUTIC EXERCISES: CPT | Mod: GP | Performed by: PHYSICAL THERAPIST
